# Patient Record
Sex: FEMALE | Race: WHITE | NOT HISPANIC OR LATINO | URBAN - METROPOLITAN AREA
[De-identification: names, ages, dates, MRNs, and addresses within clinical notes are randomized per-mention and may not be internally consistent; named-entity substitution may affect disease eponyms.]

---

## 2017-03-02 ENCOUNTER — EMERGENCY (EMERGENCY)
Facility: HOSPITAL | Age: 82
LOS: 0 days | Discharge: HOME | End: 2017-03-02

## 2017-06-27 DIAGNOSIS — Z88.2 ALLERGY STATUS TO SULFONAMIDES: ICD-10-CM

## 2017-06-27 DIAGNOSIS — M79.604 PAIN IN RIGHT LEG: ICD-10-CM

## 2017-06-27 DIAGNOSIS — Z88.6 ALLERGY STATUS TO ANALGESIC AGENT: ICD-10-CM

## 2017-06-27 DIAGNOSIS — Z88.8 ALLERGY STATUS TO OTHER DRUGS, MEDICAMENTS AND BIOLOGICAL SUBSTANCES STATUS: ICD-10-CM
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
## 2017-06-27 DIAGNOSIS — S89.91XA UNSPECIFIED INJURY OF RIGHT LOWER LEG, INITIAL ENCOUNTER: ICD-10-CM

## 2017-06-27 DIAGNOSIS — Z88.0 ALLERGY STATUS TO PENICILLIN: ICD-10-CM

## 2017-06-27 DIAGNOSIS — M79.605 PAIN IN LEFT LEG: ICD-10-CM

## 2017-06-27 DIAGNOSIS — G62.9 POLYNEUROPATHY, UNSPECIFIED: ICD-10-CM
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
## 2017-07-03 ENCOUNTER — OUTPATIENT (OUTPATIENT)
Dept: OUTPATIENT SERVICES | Facility: HOSPITAL | Age: 82
LOS: 1 days | Discharge: HOME | End: 2017-07-03

## 2017-07-03 DIAGNOSIS — I70.223 ATHEROSCLEROSIS OF NATIVE ARTERIES OF EXTREMITIES WITH REST PAIN, BILATERAL LEGS: ICD-10-CM

## 2017-07-03 DIAGNOSIS — I63.9 CEREBRAL INFARCTION, UNSPECIFIED: ICD-10-CM

## 2017-07-13 ENCOUNTER — EMERGENCY (EMERGENCY)
Facility: HOSPITAL | Age: 82
LOS: 0 days | Discharge: HOME | End: 2017-07-13
Admitting: INTERNAL MEDICINE

## 2017-07-13 DIAGNOSIS — Z79.02 LONG TERM (CURRENT) USE OF ANTITHROMBOTICS/ANTIPLATELETS: ICD-10-CM

## 2017-07-13 DIAGNOSIS — Z88.2 ALLERGY STATUS TO SULFONAMIDES: ICD-10-CM

## 2017-07-13 DIAGNOSIS — R10.9 UNSPECIFIED ABDOMINAL PAIN: ICD-10-CM

## 2017-07-13 DIAGNOSIS — Z88.6 ALLERGY STATUS TO ANALGESIC AGENT: ICD-10-CM

## 2017-07-13 DIAGNOSIS — M79.606 PAIN IN LEG, UNSPECIFIED: ICD-10-CM

## 2017-07-13 DIAGNOSIS — Z88.8 ALLERGY STATUS TO OTHER DRUGS, MEDICAMENTS AND BIOLOGICAL SUBSTANCES STATUS: ICD-10-CM

## 2017-07-13 DIAGNOSIS — Z88.0 ALLERGY STATUS TO PENICILLIN: ICD-10-CM

## 2017-07-13 DIAGNOSIS — I10 ESSENTIAL (PRIMARY) HYPERTENSION: ICD-10-CM

## 2017-07-13 DIAGNOSIS — I63.9 CEREBRAL INFARCTION, UNSPECIFIED: ICD-10-CM

## 2017-07-13 DIAGNOSIS — Z79.899 OTHER LONG TERM (CURRENT) DRUG THERAPY: ICD-10-CM

## 2017-08-23 PROBLEM — Z00.00 ENCOUNTER FOR PREVENTIVE HEALTH EXAMINATION: Status: ACTIVE | Noted: 2017-08-23

## 2018-04-03 ENCOUNTER — INPATIENT (INPATIENT)
Facility: HOSPITAL | Age: 83
LOS: 1 days | Discharge: LEFT BEFORE DISCHARGE | End: 2018-04-05
Attending: INTERNAL MEDICINE | Admitting: SPECIALIST

## 2018-04-03 VITALS
OXYGEN SATURATION: 97 % | SYSTOLIC BLOOD PRESSURE: 149 MMHG | RESPIRATION RATE: 18 BRPM | TEMPERATURE: 97 F | DIASTOLIC BLOOD PRESSURE: 76 MMHG | HEART RATE: 76 BPM

## 2018-04-03 DIAGNOSIS — Z98.890 OTHER SPECIFIED POSTPROCEDURAL STATES: Chronic | ICD-10-CM

## 2018-04-03 LAB
ANION GAP SERPL CALC-SCNC: 11 MMOL/L — SIGNIFICANT CHANGE UP (ref 7–14)
BASOPHILS # BLD AUTO: 0.03 K/UL — SIGNIFICANT CHANGE UP (ref 0–0.2)
BASOPHILS NFR BLD AUTO: 0.3 % — SIGNIFICANT CHANGE UP (ref 0–1)
BUN SERPL-MCNC: 41 MG/DL — HIGH (ref 10–20)
CALCIUM SERPL-MCNC: 9.7 MG/DL — SIGNIFICANT CHANGE UP (ref 8.5–10.1)
CHLORIDE SERPL-SCNC: 101 MMOL/L — SIGNIFICANT CHANGE UP (ref 98–110)
CK SERPL-CCNC: 23 U/L — SIGNIFICANT CHANGE UP (ref 0–225)
CO2 SERPL-SCNC: 27 MMOL/L — SIGNIFICANT CHANGE UP (ref 17–32)
CREAT SERPL-MCNC: 1.6 MG/DL — HIGH (ref 0.7–1.5)
EOSINOPHIL # BLD AUTO: 0.12 K/UL — SIGNIFICANT CHANGE UP (ref 0–0.7)
EOSINOPHIL NFR BLD AUTO: 1.3 % — SIGNIFICANT CHANGE UP (ref 0–8)
GLUCOSE SERPL-MCNC: 193 MG/DL — HIGH (ref 70–99)
HCT VFR BLD CALC: 39.3 % — SIGNIFICANT CHANGE UP (ref 37–47)
HGB BLD-MCNC: 12.6 G/DL — SIGNIFICANT CHANGE UP (ref 12–16)
IMM GRANULOCYTES NFR BLD AUTO: 0.2 % — SIGNIFICANT CHANGE UP (ref 0.1–0.3)
LYMPHOCYTES # BLD AUTO: 0.96 K/UL — LOW (ref 1.2–3.4)
LYMPHOCYTES # BLD AUTO: 10.1 % — LOW (ref 20.5–51.1)
MCHC RBC-ENTMCNC: 29.4 PG — SIGNIFICANT CHANGE UP (ref 27–31)
MCHC RBC-ENTMCNC: 32.1 G/DL — SIGNIFICANT CHANGE UP (ref 32–37)
MCV RBC AUTO: 91.8 FL — SIGNIFICANT CHANGE UP (ref 81–99)
MONOCYTES # BLD AUTO: 0.37 K/UL — SIGNIFICANT CHANGE UP (ref 0.1–0.6)
MONOCYTES NFR BLD AUTO: 3.9 % — SIGNIFICANT CHANGE UP (ref 1.7–9.3)
NEUTROPHILS # BLD AUTO: 8 K/UL — HIGH (ref 1.4–6.5)
NEUTROPHILS NFR BLD AUTO: 84.2 % — HIGH (ref 42.2–75.2)
PLATELET # BLD AUTO: 286 K/UL — SIGNIFICANT CHANGE UP (ref 130–400)
POTASSIUM SERPL-MCNC: 4.9 MMOL/L — SIGNIFICANT CHANGE UP (ref 3.5–5)
POTASSIUM SERPL-SCNC: 4.9 MMOL/L — SIGNIFICANT CHANGE UP (ref 3.5–5)
RBC # BLD: 4.28 M/UL — SIGNIFICANT CHANGE UP (ref 4.2–5.4)
RBC # FLD: 12.5 % — SIGNIFICANT CHANGE UP (ref 11.5–14.5)
SODIUM SERPL-SCNC: 139 MMOL/L — SIGNIFICANT CHANGE UP (ref 135–146)
WBC # BLD: 9.5 K/UL — SIGNIFICANT CHANGE UP (ref 4.8–10.8)
WBC # FLD AUTO: 9.5 K/UL — SIGNIFICANT CHANGE UP (ref 4.8–10.8)

## 2018-04-03 RX ORDER — GABAPENTIN 400 MG/1
100 CAPSULE ORAL EVERY 12 HOURS
Qty: 0 | Refills: 0 | Status: DISCONTINUED | OUTPATIENT
Start: 2018-04-03 | End: 2018-04-05

## 2018-04-03 RX ORDER — CLOPIDOGREL BISULFATE 75 MG/1
75 TABLET, FILM COATED ORAL DAILY
Qty: 0 | Refills: 0 | Status: DISCONTINUED | OUTPATIENT
Start: 2018-04-03 | End: 2018-04-05

## 2018-04-03 RX ORDER — NICOTINE POLACRILEX 2 MG
1 GUM BUCCAL DAILY
Qty: 0 | Refills: 0 | Status: DISCONTINUED | OUTPATIENT
Start: 2018-04-03 | End: 2018-04-05

## 2018-04-03 RX ORDER — AMLODIPINE BESYLATE 2.5 MG/1
5 TABLET ORAL DAILY
Qty: 0 | Refills: 0 | Status: DISCONTINUED | OUTPATIENT
Start: 2018-04-03 | End: 2018-04-05

## 2018-04-03 RX ORDER — HEPARIN SODIUM 5000 [USP'U]/ML
5000 INJECTION INTRAVENOUS; SUBCUTANEOUS EVERY 8 HOURS
Qty: 0 | Refills: 0 | Status: DISCONTINUED | OUTPATIENT
Start: 2018-04-03 | End: 2018-04-05

## 2018-04-03 RX ORDER — TRAMADOL HYDROCHLORIDE 50 MG/1
25 TABLET ORAL
Qty: 0 | Refills: 0 | Status: DISCONTINUED | OUTPATIENT
Start: 2018-04-03 | End: 2018-04-04

## 2018-04-03 RX ORDER — SODIUM CHLORIDE 9 MG/ML
3 INJECTION INTRAMUSCULAR; INTRAVENOUS; SUBCUTANEOUS ONCE
Qty: 0 | Refills: 0 | Status: COMPLETED | OUTPATIENT
Start: 2018-04-03 | End: 2018-04-03

## 2018-04-03 RX ORDER — GABAPENTIN 400 MG/1
250 CAPSULE ORAL
Qty: 0 | Refills: 0 | Status: DISCONTINUED | OUTPATIENT
Start: 2018-04-03 | End: 2018-04-04

## 2018-04-03 RX ORDER — LOSARTAN POTASSIUM 100 MG/1
50 TABLET, FILM COATED ORAL DAILY
Qty: 0 | Refills: 0 | Status: DISCONTINUED | OUTPATIENT
Start: 2018-04-03 | End: 2018-04-05

## 2018-04-03 RX ORDER — MIRTAZAPINE 45 MG/1
7.5 TABLET, ORALLY DISINTEGRATING ORAL AT BEDTIME
Qty: 0 | Refills: 0 | Status: DISCONTINUED | OUTPATIENT
Start: 2018-04-03 | End: 2018-04-05

## 2018-04-03 RX ADMIN — AMLODIPINE BESYLATE 5 MILLIGRAM(S): 2.5 TABLET ORAL at 18:12

## 2018-04-03 RX ADMIN — MIRTAZAPINE 7.5 MILLIGRAM(S): 45 TABLET, ORALLY DISINTEGRATING ORAL at 22:24

## 2018-04-03 RX ADMIN — HEPARIN SODIUM 5000 UNIT(S): 5000 INJECTION INTRAVENOUS; SUBCUTANEOUS at 22:24

## 2018-04-03 NOTE — ED PROVIDER NOTE - NS ED ROS FT
Eyes:  No visual changes  ENMT:  no otalgia. no sore throat.   Cardiac:  No chest pain or sob.   Respiratory:  No cough or respiratory distress.  GI:  No nausea, vomiting, diarrhea or abdominal pain.  :  no dysuria   MS:  (+) quadricep muscles stiff.   (+) neck pain with radicular pain  Neuro:  no headache. no focal weakness.   Skin:  no skin rash.   Endocrine: no diabetes

## 2018-04-03 NOTE — H&P ADULT - NSHPPHYSICALEXAM_GEN_ALL_CORE
T(F): 97.9 (04-03-18 @ 16:02), Max: 97.9 (04-03-18 @ 16:02)  HR: 67 (04-03-18 @ 16:02) (67 - 76)  BP: 121/58 (04-03-18 @ 16:02) (121/58 - 149/76)  RR: 18 (04-03-18 @ 16:02) (18 - 18)  SpO2: 95% (04-03-18 @ 16:02) (95% - 97%)  PHYSICAL EXAM:  GENERAL: NAD, speaks in full sentences, no signs of respiratory distress  HEAD:  Atraumatic, Normocephalic  EYES: EOMI, PERRLA, conjunctiva and sclera clear  NECK: Supple, No JVD  CHEST/LUNG: Clear to auscultation bilaterally; No wheeze; No crackles; No accessory muscles used  HEART: Regular rate and rhythm; No murmurs;   ABDOMEN: Soft, Nontender, Nondistended; Bowel sounds present; No guarding  EXTREMITIES:  2+ Peripheral Pulses, No cyanosis or edema  PSYCH: AAOx3  NEUROLOGY: 4/5 in lower extremities and 5/5 in upper extremities.   SKIN: No rashes or lesions

## 2018-04-03 NOTE — H&P ADULT - PMH
Abdominal aortic aneurysm (AAA) without rupture    Essential hypertension    Falls    TIA (transient ischemic attack)

## 2018-04-03 NOTE — H&P ADULT - NSHPSOCIALHISTORY_GEN_ALL_CORE
Marital Status:  (   )    (   ) Single    (   )    ( x )   Occupation: retired  Lives with: (x  ) alone  (  ) children   (  ) spouse   (  ) parents  (  ) other    Substance Use (street drugs): ( x ) never used  (  ) other:  Tobacco Usage:  (   ) never smoked   (   ) former smoker   (  x ) current smoker  ( 1 [id number] ) pack year  (        ) last cigarette date  Alcohol Usage: none

## 2018-04-03 NOTE — H&P ADULT - HISTORY OF PRESENT ILLNESS
82Y F with pmh of htn, tia, aaa s/p repair and neuropathy presents to the ED with complaints of generalized weakness for the past 3 months. Patient says she was ambulating without assistance 3 months ago but since then she has started experiencing bilateral arm pain, neck and back pain. This has made it more difficult for her to get up from a sitting position and move around. Since the past 3 weeks she feels that her thigh muscles are very weak and she is unable to get up from a chair without using both arm for assistance. She has no weight loss, sob, chest pain, nausea, vomiting, diarrhea, joint swelling, numbness/tingling in extremities, dis balance, or loss of appetite. As per niece they have found the patient on the floor multiple times when they went to visit her (she lives alone). No recent fevers, chills or dizziness.

## 2018-04-03 NOTE — ED PROVIDER NOTE - MEDICAL DECISION MAKING DETAILS
pt unsafe dc. Lives alone, and having multiple falls. Pt uses cane/walker and despite this having the noted problems

## 2018-04-03 NOTE — H&P ADULT - ASSESSMENT
82Y F with pmh of htn, tia, aaa s/p repair and neuropathy presents to the ED with complaints of generalized weakness for the past 3 months.    Generalized weakness likely 2/2 arthritis vs cervical stenosis vs uremia vs hypothyroidism vs malignancy   -Admit to medicine  -CT head wnl   -cxr shows nodular opacity and pt is current smoker. Has never had CT chest, colonoscopy or mammogram  -Check TSH, b12, esr  -BUN elevated. check urine sodium and serum osmolality. CMP and ldh  -Creatinine is at baseline likely 2/2 CKD  -Cervical spine xray  -Pain control with tramadol  -Pt rehab eval    CKD stable  -monitor bmp    HTN  -controlled with losartan and amlodipine    TIA  -c/w plavix, pt stopped statin due to muscle aches    Neuropathy  -c/w gabapentin and mirtazapine    Dispo: Pt is DNR/DNI from home. Discharge as per PT rehab

## 2018-04-03 NOTE — ED PROVIDER NOTE - OBJECTIVE STATEMENT
82 F pmh htn, tia on plavix presents for complaint of difficulty ambulating 2/2 heaviness in her legs localized to her quadriceps muscles worsening recently.  patient reports she ambulates with a cane but is having increasing difficulty.  patient also complaining of chronic neck pain with radicular pain to arms. denies headache. no visual changes. no chest pain. no sob.  no abd pain.  denies fever/chills.

## 2018-04-03 NOTE — ED PROVIDER NOTE - PROGRESS NOTE DETAILS
Discussed patient with her niece Lindsay.  she reports patient lives at home alone, she has been falling more frequently.  family checks on her frequently and patient was found on the floor at home last weekend by her daughter, uncertain of how long she was down. Attending note:  I personally evaluated the patient. I reviewed the Resident’s note (as assigned above), and agree with the findings and plan except as documented in my note.   81 y/o F presents reporting difficulty ambulating due to heaviness in her thighs and feeling that the anterior thigh musculature is tight vs. that she has lost a lot of weight making her thighs feel firm to her. Pt at baseline ambulates with a cane or walker. Pt also reporting exacerbation of chronic pain in her shoulders for which she receives outpatient care and takes Gabapentin. Pt’s family requested a conversation noting to us that pt has been with what they believe to be additional TIA’s with fall. At one point family states that they found pt on the floor several days ago for an unknown duration of time. When directly questioned pt denies having any difficulty getting off of the floor, states that she had fallen but not for over a week. Pt denies HA, dizziness, change in vision or hearing, feeling off balance, CP, SOB, abdominal pain, fever. Pt is awake and alert, she is in no distress, NCAT, CN intact, there is diffuse weakness 4+ over 5 throughout, no edema, no deformities, no tenderness in the extremities or midline spine, chest clear. Will check labs, IVF, and reevaluate for d/c. I am concerned for pt’s safety at home given her limited ability to ambulate and her families report of frequent falls.

## 2018-04-03 NOTE — ED PROVIDER NOTE - PHYSICAL EXAMINATION
CONSTITUTIONAL: Well-developed; well-nourished; in no acute distress.   SKIN: warm, dry  HEAD: Normocephalic; atraumatic.  EYES: no conj injection  ENT: No nasal discharge; airway clear.  CARD: S1, S2 normal  RESP: clear to auscultation bilaterally   ABD: soft ntnd  EXT:  no ctls spine tenderness. Normal ROM.  5/5 strength bilaterally.  2+ distal pulses.  < 2 sec cap refill   NEURO: alert, oriented, grossly unremarkable

## 2018-04-03 NOTE — H&P ADULT - NSHPLABSRESULTS_GEN_ALL_CORE
12.6   9.50  )-----------( 286      ( 03 Apr 2018 11:19 )             39.3       04-03    139  |  101  |  41<H>  ----------------------------<  193<H>  4.9   |  27  |  1.6<H>    Ca    9.7      03 Apr 2018 11:19

## 2018-04-04 LAB
ALBUMIN SERPL ELPH-MCNC: 3.6 G/DL — SIGNIFICANT CHANGE UP (ref 3.5–5.2)
ALP SERPL-CCNC: 97 U/L — SIGNIFICANT CHANGE UP (ref 30–115)
ALT FLD-CCNC: 10 U/L — SIGNIFICANT CHANGE UP (ref 0–41)
ANION GAP SERPL CALC-SCNC: 18 MMOL/L — HIGH (ref 7–14)
AST SERPL-CCNC: 15 U/L — SIGNIFICANT CHANGE UP (ref 0–41)
BILIRUB SERPL-MCNC: <0.2 MG/DL — SIGNIFICANT CHANGE UP (ref 0.2–1.2)
BUN SERPL-MCNC: 40 MG/DL — HIGH (ref 10–20)
CALCIUM SERPL-MCNC: 9.1 MG/DL — SIGNIFICANT CHANGE UP (ref 8.5–10.1)
CHLORIDE SERPL-SCNC: 104 MMOL/L — SIGNIFICANT CHANGE UP (ref 98–110)
CK SERPL-CCNC: 26 U/L — SIGNIFICANT CHANGE UP (ref 0–225)
CO2 SERPL-SCNC: 19 MMOL/L — SIGNIFICANT CHANGE UP (ref 17–32)
CREAT SERPL-MCNC: 1.7 MG/DL — HIGH (ref 0.7–1.5)
ERYTHROCYTE [SEDIMENTATION RATE] IN BLOOD: 44 MM/HR — HIGH (ref 0–20)
GLUCOSE SERPL-MCNC: 72 MG/DL — SIGNIFICANT CHANGE UP (ref 70–99)
POTASSIUM SERPL-MCNC: 5.2 MMOL/L — HIGH (ref 3.5–5)
POTASSIUM SERPL-SCNC: 5.2 MMOL/L — HIGH (ref 3.5–5)
PROT SERPL-MCNC: 5.8 G/DL — LOW (ref 6–8)
SODIUM SERPL-SCNC: 141 MMOL/L — SIGNIFICANT CHANGE UP (ref 135–146)

## 2018-04-04 RX ORDER — TRAMADOL HYDROCHLORIDE 50 MG/1
50 TABLET ORAL
Qty: 0 | Refills: 0 | Status: DISCONTINUED | OUTPATIENT
Start: 2018-04-04 | End: 2018-04-05

## 2018-04-04 RX ADMIN — GABAPENTIN 100 MILLIGRAM(S): 400 CAPSULE ORAL at 05:49

## 2018-04-04 RX ADMIN — CLOPIDOGREL BISULFATE 75 MILLIGRAM(S): 75 TABLET, FILM COATED ORAL at 14:28

## 2018-04-04 RX ADMIN — HEPARIN SODIUM 5000 UNIT(S): 5000 INJECTION INTRAVENOUS; SUBCUTANEOUS at 05:49

## 2018-04-04 RX ADMIN — GABAPENTIN 100 MILLIGRAM(S): 400 CAPSULE ORAL at 17:36

## 2018-04-04 RX ADMIN — LOSARTAN POTASSIUM 50 MILLIGRAM(S): 100 TABLET, FILM COATED ORAL at 05:48

## 2018-04-04 RX ADMIN — MIRTAZAPINE 7.5 MILLIGRAM(S): 45 TABLET, ORALLY DISINTEGRATING ORAL at 21:15

## 2018-04-04 RX ADMIN — AMLODIPINE BESYLATE 5 MILLIGRAM(S): 2.5 TABLET ORAL at 05:48

## 2018-04-04 NOTE — CONSULT NOTE ADULT - ASSESSMENT
IMPRESSION: Rehab of Ataxia, right shoulder impingement syndrome, neck pain    PRECAUTIONS: [x  ] Cardiac  [  ] Respiratory  [  ] Seizures [  ] Contact Isolation  [  ] Droplet Isolation  [  ] Other    Weight Bearing Status:     RECOMMENDATION:    Out of Bed to Chair     DVT/Decubiti Prophylaxis    REHAB PLAN:     [ xx  ] Bedside P/T 3-5 times a week   [   ]   Bedside O/T  2-3 times a week             [   ] No Rehab Therapy Indicated                   [   ]  Speech Therapy   Conditioning/ROM                                    ADL  Bed Mobility                                               Conditioning/ROM  Transfers                                                     Bed Mobility  Sitting /Standing Balance                         Transfers                                        Gait Training                                               Sitting/Standing Balance  Stair Training [   ]Applicable                    Home equipment Eval                                                                        Splinting  [   ] Only      GOALS:   ADL   [x   ]   Independent                    Transfers  [ x  ] Independent                          Ambulation  [ x  ] Independent     [x    ] With device                            [   ]  CG                                                         [   ]  CG                                                                  [   ] CG                            [    ] Min A                                                   [   ] Min A                                                              [   ] Min  A          DISCHARGE PLAN:   [   ]  Good candidate for Intensive Rehabilitation/Hospital based-4A [facility]                                             Will tolerate 3hrs Intensive Rehab Daily                                       [ xx   ]  Short Term Rehab in Skilled Nursing Facility                                       [    ]  Home with Outpatient or  services                                         [    ]  Possible Candidate for Intensive Hospital based Rehab

## 2018-04-04 NOTE — PROGRESS NOTE ADULT - SUBJECTIVE AND OBJECTIVE BOX
LENGTH OF HOSPITAL STAY: 1d    CHIEF COMPLAINT:   Patient is a 82y old  Female who presents with a chief complaint of generalized weakness (03 Apr 2018 16:55)    EVENTS OVERNIGHT:  HISTORY OF PRESENTING ILLNESS:    HPI:  82Y F with pmh of htn, tia, aaa s/p repair and neuropathy presents to the ED with complaints of generalized weakness for the past 3 months. Patient says she was ambulating without assistance 3 months ago but since then she has started experiencing bilateral arm pain, neck and back pain. This has made it more difficult for her to get up from a sitting position and move around. Since the past 3 weeks she feels that her thigh muscles are very weak and she is unable to get up from a chair without using both arm for assistance. She has no weight loss, sob, chest pain, nausea, vomiting, diarrhea, joint swelling, numbness/tingling in extremities, dis balance, or loss of appetite. As per niece they have found the patient on the floor multiple times when they went to visit her (she lives alone). No recent fevers, chills or dizziness. (03 Apr 2018 16:55)    PAST MEDICAL & SURGICAL HISTORY  PAST MEDICAL & SURGICAL HISTORY:  Abdominal aortic aneurysm (AAA) without rupture  Essential hypertension  Falls  TIA (transient ischemic attack)  H/O abdominal aortic aneurysm repair    SOCIAL HISTORY:  current smoker (1PPD)  lives alone  ALLERGIES:  Fosamax (Other (Moderate))  Novacet (Rash)  penicillin (Rash)  Sulfac 10% (Rash)  Tylenol (Pruritus)    Home Medications:  amLODIPine 5 mg oral tablet: 1 tab(s) orally once a day (03 Apr 2018 17:09)  CLOPIDOGREL  TAB 75MG:  (03 Apr 2018 17:09)  GABAPENTIN   /5ML:  (03 Apr 2018 17:09)  LOSARTAN POT TAB 50MG:  (03 Apr 2018 17:09)  MIRTAZAPINE  TAB 7.5MG:  (03 Apr 2018 17:09)      MEDICATIONS:  STANDING MEDICATIONS  amLODIPine   Tablet 5 milliGRAM(s) Oral daily  clopidogrel Tablet 75 milliGRAM(s) Oral daily  gabapentin 100 milliGRAM(s) Oral every 12 hours  gabapentin   Solution 250 milliGRAM(s) Oral two times a day  heparin  Injectable 5000 Unit(s) SubCutaneous every 8 hours  losartan 50 milliGRAM(s) Oral daily  mirtazapine 7.5 milliGRAM(s) Oral at bedtime  nicotine - 21 mG/24Hr(s) Patch 1 patch Transdermal daily    PRN MEDICATIONS  traMADol 25 milliGRAM(s) Oral two times a day PRN    VITALS:   T(F): 98  HR: 72  BP: 136/70  RR: 16  SpO2: 95%    LABS:                        12.6   9.50  )-----------( 286      ( 03 Apr 2018 11:19 )             39.3     04-03    139  |  101  |  41<H>  ----------------------------<  193<H>  4.9   |  27  |  1.6<H>    Ca    9.7      03 Apr 2018 11:19            Creatine Kinase, Serum: 23 U/L (04-03-18 @ 11:19)      CARDIAC MARKERS ( 03 Apr 2018 11:19 )  x     / x     / 23 U/L / x     / x          RADIOLOGY:  < from: CT Head No Cont (04.03.18 @ 15:00) >    IMPRESSION:     No evidence of acute intracranial pathology.  Stableexam since 3/12/2014.        < end of copied text >  < from: Xray Chest 1 View AP/PA (04.03.18 @ 11:31) >    Impression:      Right basilar peripheral nodular opacity. Right basilar focal atelectasis.    < end of copied text >    PHYSICAL EXAM:  GEN: No acute distress  HEENT:   LUNGS: Clear to auscultation bilaterally   HEART: S1/S2 present. RRR.   ABD: Soft, non-tender, non-distended. Bowel sounds present  EXT:  NEURO: AAOX3 LENGTH OF HOSPITAL STAY: 1d    CHIEF COMPLAINT:   Patient is a 82y old  Female who presents with a chief complaint of generalized weakness (03 Apr 2018 16:55)    EVENTS OVERNIGHT:none ,lying comfortably in bed  HISTORY OF PRESENTING ILLNESS:    HPI:  82Y F with pmh of htn, tia, aaa s/p repair and neuropathy presents to the ED with complaints of generalized weakness for the past 3 months. Patient says she was ambulating without assistance 3 months ago but since then she has started experiencing bilateral arm pain, neck and back pain. This has made it more difficult for her to get up from a sitting position and move around. Since the past 3 weeks she feels that her thigh muscles are very weak and she is unable to get up from a chair without using both arm for assistance. She has no weight loss, sob, chest pain, nausea, vomiting, diarrhea, joint swelling, numbness/tingling in extremities, dis balance, or loss of appetite. As per niece they have found the patient on the floor multiple times when they went to visit her (she lives alone). No recent fevers, chills or dizziness. (03 Apr 2018 16:55)    PAST MEDICAL & SURGICAL HISTORY  PAST MEDICAL & SURGICAL HISTORY:  Abdominal aortic aneurysm (AAA) without rupture  Essential hypertension  Falls  TIA (transient ischemic attack)  H/O abdominal aortic aneurysm repair    SOCIAL HISTORY:  current smoker (1PPD)  lives alone  ALLERGIES:  Fosamax (Other (Moderate))  Novacet (Rash)  penicillin (Rash)  Sulfac 10% (Rash)  Tylenol (Pruritus)    Home Medications:  amLODIPine 5 mg oral tablet: 1 tab(s) orally once a day (03 Apr 2018 17:09)  CLOPIDOGREL  TAB 75MG:  (03 Apr 2018 17:09)  GABAPENTIN   /5ML:  (03 Apr 2018 17:09)  LOSARTAN POT TAB 50MG:  (03 Apr 2018 17:09)  MIRTAZAPINE  TAB 7.5MG:  (03 Apr 2018 17:09)      MEDICATIONS:  STANDING MEDICATIONS  amLODIPine   Tablet 5 milliGRAM(s) Oral daily  clopidogrel Tablet 75 milliGRAM(s) Oral daily  gabapentin 100 milliGRAM(s) Oral every 12 hours  gabapentin   Solution 250 milliGRAM(s) Oral two times a day  heparin  Injectable 5000 Unit(s) SubCutaneous every 8 hours  losartan 50 milliGRAM(s) Oral daily  mirtazapine 7.5 milliGRAM(s) Oral at bedtime  nicotine - 21 mG/24Hr(s) Patch 1 patch Transdermal daily    PRN MEDICATIONS  traMADol 25 milliGRAM(s) Oral two times a day PRN    VITALS:   T(F): 98  HR: 72  BP: 136/70  RR: 16  SpO2: 95%    LABS:                                           12.6   9.50  )-----------( 286      ( 03 Apr 2018 11:19 )             39.3   04-04    141  |  104  |  40<H>  ----------------------------<  72  5.2<H>   |  19  |  1.7<H>    Ca    9.1      04 Apr 2018 05:52    TPro  5.8<L>  /  Alb  3.6  /  TBili  <0.2  /  DBili  x   /  AST  15  /  ALT  10  /  AlkPhos  97  04-04            Creatine Kinase, Serum: 23 U/L (04-03-18 @ 11:19)      CARDIAC MARKERS ( 03 Apr 2018 11:19 )  x     / x     / 23 U/L / x     / x          RADIOLOGY:  < from: CT Head No Cont (04.03.18 @ 15:00) >    IMPRESSION:     No evidence of acute intracranial pathology.  Stableexam since 3/12/2014.        < end of copied text >  < from: Xray Chest 1 View AP/PA (04.03.18 @ 11:31) >    Impression:      Right basilar peripheral nodular opacity. Right basilar focal atelectasis.    < end of copied text >    PHYSICAL EXAM:  GEN: No acute distress  HEENT: within normal range  LUNGS: Clear to auscultation bilaterally   HEART: S1/S2 present. RRR.   ABD: Soft, non-tender, non-distended. Bowel sounds present  EXT:no LE edema  NEURO: AAOX3  non focal  power 5/5 in all extremities

## 2018-04-04 NOTE — PROGRESS NOTE ADULT - ASSESSMENT
82Y F with pmh of htn, tia, aaa s/p repair and neuropathy presents to the ED with complaints of generalized weakness for the past 3 months.    3Generalized weakness likely        2/2 arthritis vs cervical stenosis vs uremia vs hypothyroidism vs malignancy   -CT head wnl   -cxr shows nodular opacity and pt is current smoker. Has never had CT chest, colonoscopy or mammogram  -Check TSH, b12, esr  -BUN elevated. check urine sodium and serum osmolality. CMP and ldh  -Creatinine is at baseline likely 2/2 CKD  #-Cervical spine xray  -Pain control with tramadol  -Pt rehab eval    #CKD stable  -monitor bmp  -baseline 1.5 (7/17)    #HTN  -controlled with losartan and amlodipine    #TIA  -c/w plavix, pt stopped statin due to muscle aches    #Neuropathy  -c/w gabapentin and mirtazapine    Dispo: Pt is DNR/DNI from home.   Discharge pendind PT eval 82Y F with pmh of htn, tia, aaa s/p repair and neuropathy presents to the ED with complaints of generalized weakness for the past 3 months.    3Generalized weakness likely        2/2 arthritis vs cervical stenosis vs uremia vs hypothyroidism vs malignancy   -CT head wnl   -cxr shows nodular opacity and pt is current smoker. Has never had CT chest, colonoscopy or mammogram  -Check TSH, b12, esr  -BUN elevated. check urine sodium and serum osmolality. CMP and ldh  -Creatinine is at baseline likely 2/2 CKD  #-Cervical spine xray  -Pain control with tramadol  -Pt rehab eval    #CKD stable  -monitor bmp  -baseline 1.5 (7/17)    #HTN  -controlled with losartan and amlodipine    #TIA  -c/w plavix, pt stopped statin due to muscle aches    #Neuropathy  -c/w gabapentin and mirtazapine    Dispo: Pt is DNR/DNI from home.   Discharge pending PT eval  possible candidate for SNF 82Y F with pmh of htn, tia, aaa s/p repair and neuropathy presents to the ED with complaints of generalized weakness for the past 3 months.    #Generalized weakness likely        2/2 arthritis vs cervical stenosis vs uremia vs hypothyroidism vs malignancy   -CT head wnl   -cxr shows nodular opacity and pt is current smoker. Has never had CT chest, colonoscopy or mammogram  -Check TSH, b12, ESR  -BUN elevated. check urine sodium and serum osmolality. CMP and LDH  -Creatinine is at baseline likely 2/2 CKD  -Cervical spine xray  -Pain control with tramadol  -Pt rehab eval    #CKD stable  -monitor bmp  -baseline 1.5 (7/17)    #HTN  -controlled with losartan and amlodipine    #TIA  -c/w plavix, pt stopped statin due to muscle aches    #Neuropathy  -c/w gabapentin and mirtazapine    Dispo: Pt is DNR/DNI from home.   Discharge pending PT eval  possible candidate for SNF

## 2018-04-04 NOTE — CONSULT NOTE ADULT - SUBJECTIVE AND OBJECTIVE BOX
HPI:  82Y F with pmh of htn, tia, aaa s/p repair and neuropathy presents to the ED with complaints of generalized weakness for the past 3 months. Patient says she was ambulating without assistance 3 months ago but since then she has started experiencing bilateral arm pain, neck and back pain. This has made it more difficult for her to get up from a sitting position and move around. Since the past 3 weeks she feels that her thigh muscles are very weak and she is unable to get up from a chair without using both arm for assistance. She has no weight loss, sob, chest pain, nausea, vomiting, diarrhea, joint swelling, numbness/tingling in extremities, dis balance, or loss of appetite. As per niece they have found the patient on the floor multiple times when they went to visit her (she lives alone). No recent fevers, chills or dizziness. (03 Apr 2018 16:55)      PAST MEDICAL & SURGICAL HISTORY:  Abdominal aortic aneurysm (AAA) without rupture  Essential hypertension  Falls  TIA (transient ischemic attack)  H/O abdominal aortic aneurysm repair      Hospital Course:  She needs some help with transfers and then amb. with walker with slight unsteadiness. She has some right shoulder pain. She has some neck pain. Strength appears preserved in the LE's.  TODAY'S SUBJECTIVE & REVIEW OF SYMPTOMS:     Constitutional WNL   Cardio WNL   Resp WNL   GI WNL  Heme WNL  Endo WNL  Skin WNL  MSK WNL  Neuro WNL  Cognitive WNL  Psych WNL      MEDICATIONS  (STANDING):  amLODIPine   Tablet 5 milliGRAM(s) Oral daily  clopidogrel Tablet 75 milliGRAM(s) Oral daily  gabapentin 100 milliGRAM(s) Oral every 12 hours  heparin  Injectable 5000 Unit(s) SubCutaneous every 8 hours  losartan 50 milliGRAM(s) Oral daily  mirtazapine 7.5 milliGRAM(s) Oral at bedtime  nicotine - 21 mG/24Hr(s) Patch 1 patch Transdermal daily    MEDICATIONS  (PRN):  traMADol 50 milliGRAM(s) Oral two times a day PRN Moderate Pain (4 - 6)      FAMILY HISTORY:  No pertinent family history in first degree relatives      Allergies    Fosamax (Other (Moderate))  Novacet (Rash)  penicillin (Rash)  Sulfac 10% (Rash)  Tylenol (Pruritus)    Intolerances        SOCIAL HISTORY:    [  ] Etoh  [  ] Smoking  [  ] Substance abuse     Home Environment:  [x  ] Home Alone  [x  ] Lives with Family--lives in downstairs apt in same house  [  ] Home Health Aid    Dwelling:  [  ] Apartment  [  ] Private House  [  ] Adult Home  [  ] Skilled Nursing Facility      [  ] Short Term  [  ] Long Term  [  ] Stairs       Elevator [  ]    FUNCTIONAL STATUS PTA: (Check all that apply)  Ambulation: [x   ]Independent    [  ] Dependent     [  ] Non-Ambulatory  Assistive Device: [x  ] SA Cane  [  ]  Q Cane  [  ] Walker  [  ]  Wheelchair  ADL : [  ] Independent  [  ]  Dependent       Vital Signs Last 24 Hrs  T(C): 37 (04 Apr 2018 06:00), Max: 37 (04 Apr 2018 06:00)  T(F): 98.6 (04 Apr 2018 06:00), Max: 98.6 (04 Apr 2018 06:00)  HR: 80 (04 Apr 2018 06:00) (67 - 80)  BP: 130/70 (04 Apr 2018 06:00) (121/58 - 159/73)  BP(mean): --  RR: 18 (04 Apr 2018 06:00) (16 - 18)  SpO2: 95% (03 Apr 2018 16:02) (95% - 95%)      PHYSICAL EXAM: Alert & Oriented X3  GENERAL: NAD, well-groomed, well-developed  HEAD:  Atraumatic, Normocephalic  EYES: EOMI, PERRLA, conjunctiva and sclera clear  NECK: Supple, No JVD, Normal thyroid  CHEST/LUNG: Clear to percussion bilaterally; No rales, rhonchi, wheezing, or rubs  HEART: Regular rate and rhythm; No murmurs, rubs, or gallops  ABDOMEN: Soft, Nontender, Nondistended; Bowel sounds present  EXTREMITIES:  2+ Peripheral Pulses, No clubbing, cyanosis, or edema    NERVOUS SYSTEM:  Cranial Nerves 2-12 intact [  ] Abnormal  [  ]  ROM: WFL all extremities [  ]  Abnormal [  ] positive right shoulder impngement sign at 160 degrees  Motor Strength: WFL all extremities  [  ]  Abnormal [  ] 5/5 in LE's; right shoulder 4/5  Sensation: intact to light touch [  ] Abnormal [  ]  Reflexes: Symmetric [  ]  Abnormal [  ]    FUNCTIONAL STATUS:  Bed Mobility: Independent [  ]  Supervision [  ]  Needs Assistance [  ]  N/A [  ]  Transfers: Independent [  ]  Supervision [  ]  Needs Assistance [  ]  N/A [  ]   Ambulation: Independent [  ]  Supervision [  ]  Needs Assistance [  ]  N/A [  ]  ADL: Independent [  ] Requires Assistance [  ] N/A [  ]      LABS:                        12.6   9.50  )-----------( 286      ( 03 Apr 2018 11:19 )             39.3     04-04    141  |  104  |  40<H>  ----------------------------<  72  5.2<H>   |  19  |  1.7<H>    Ca    9.1      04 Apr 2018 05:52    TPro  5.8<L>  /  Alb  3.6  /  TBili  <0.2  /  DBili  x   /  AST  15  /  ALT  10  /  AlkPhos  97  04-04          RADIOLOGY & ADDITIONAL STUDIES:    Assesment:

## 2018-04-05 VITALS — WEIGHT: 105.82 LBS

## 2018-04-05 RX ADMIN — LOSARTAN POTASSIUM 50 MILLIGRAM(S): 100 TABLET, FILM COATED ORAL at 05:48

## 2018-04-05 RX ADMIN — AMLODIPINE BESYLATE 5 MILLIGRAM(S): 2.5 TABLET ORAL at 05:48

## 2018-04-05 RX ADMIN — GABAPENTIN 100 MILLIGRAM(S): 400 CAPSULE ORAL at 05:48

## 2018-04-05 NOTE — DISCHARGE NOTE ADULT - CARE PLAN
Principal Discharge DX:	Polymyalgia rheumatica  Goal:	diagnosis and treatment  Assessment and plan of treatment:	diagnosed as PMR after ESR of 44 and clinical symptoms. discharged on low dose steroids.

## 2018-04-05 NOTE — DISCHARGE NOTE ADULT - CARE PROVIDER_API CALL
Santiago Berger), Family Medicine  [address] [place] [address]  Phone: [phone]  Fax: [phone]

## 2018-04-05 NOTE — DISCHARGE NOTE ADULT - PATIENT PORTAL LINK FT
You can access the [facility] Portal, offered by [facility], by registering with the following website: [email or web address], [email or web address]

## 2018-04-05 NOTE — DISCHARGE NOTE ADULT - CONDITIONS AT DISCHARGE
patient left AMA even after much discussion with resident, attending and nurse managemer. pt does not want to stay in hops[facility] for PT rehab and homecare setup.

## 2018-04-05 NOTE — DISCHARGE NOTE ADULT - PLAN OF CARE
diagnosis and treatment diagnosed as PMR after ESR of 44 and clinical symptoms. discharged on low dose steroids.

## 2018-04-05 NOTE — DISCHARGE NOTE ADULT - HOSPITAL COURSE
82Y F with pmh of htn, tia, aaa s/p repair and neuropathy presents to the ED with complaints of generalized weakness for the past 3 months. Patient says she was ambulating without assistance 3 months ago but since then she has started experiencing bilateral arm pain, neck and back pain. This has made it more difficult for her to get up from a sitting position and move around. Since the past 3 weeks she feels that her thigh muscles are very weak and she is unable to get up from a chair without using both arm for assistance. She has no weight loss, sob, chest pain, nausea, vomiting, diarrhea, joint swelling, numbness/tingling in extremities, dis balance, or loss of appetite. As per niece they have found the patient on the floor multiple times when they went to visit her (she lives alone). No recent fevers, chills or dizziness.          Diagnosed as polymyalgia rheumatica after ESR of 44 and clinical symptoms. discharged on low dose steroids.      [place] left AMA. patient left AMA even after much discussion with resident, attending and nurse managemer. pt does not want to stay in hopsital for PT rehab and homecare setup.

## 2018-04-07 LAB
T3 SERPL-MCNC: 108 NG/DL — SIGNIFICANT CHANGE UP (ref 80–200)
T4 AB SER-ACNC: 7.1 UG/DL — SIGNIFICANT CHANGE UP (ref 4.6–12)
TSH SERPL-MCNC: 1.11 UIU/ML — SIGNIFICANT CHANGE UP (ref 0.27–4.2)

## 2018-04-09 DIAGNOSIS — Z88.0 ALLERGY STATUS TO PENICILLIN: ICD-10-CM

## 2018-04-09 DIAGNOSIS — G62.9 POLYNEUROPATHY, UNSPECIFIED: ICD-10-CM

## 2018-04-09 DIAGNOSIS — Z66 DO NOT RESUSCITATE: ICD-10-CM

## 2018-04-09 DIAGNOSIS — I12.9 HYPERTENSIVE CHRONIC KIDNEY DISEASE WITH STAGE 1 THROUGH STAGE 4 CHRONIC KIDNEY DISEASE, OR UNSPECIFIED CHRONIC KIDNEY DISEASE: ICD-10-CM

## 2018-04-09 DIAGNOSIS — Z86.73 PERSONAL HISTORY OF TRANSIENT ISCHEMIC ATTACK (TIA), AND CEREBRAL INFARCTION WITHOUT RESIDUAL DEFICITS: ICD-10-CM

## 2018-04-09 DIAGNOSIS — M35.3 POLYMYALGIA RHEUMATICA: ICD-10-CM

## 2018-04-09 DIAGNOSIS — R53.1 WEAKNESS: ICD-10-CM

## 2018-04-09 DIAGNOSIS — Z91.81 HISTORY OF FALLING: ICD-10-CM

## 2018-04-09 DIAGNOSIS — Z88.2 ALLERGY STATUS TO SULFONAMIDES: ICD-10-CM

## 2018-04-09 DIAGNOSIS — Z88.8 ALLERGY STATUS TO OTHER DRUGS, MEDICAMENTS AND BIOLOGICAL SUBSTANCES: ICD-10-CM

## 2018-04-09 DIAGNOSIS — N18.9 CHRONIC KIDNEY DISEASE, UNSPECIFIED: ICD-10-CM

## 2018-08-23 ENCOUNTER — INPATIENT (INPATIENT)
Facility: HOSPITAL | Age: 83
LOS: 7 days | Discharge: NURSING, REHAB OR LONG-TERM CARE | End: 2018-08-31
Attending: INTERNAL MEDICINE | Admitting: INTERNAL MEDICINE

## 2018-08-23 VITALS
TEMPERATURE: 98 F | RESPIRATION RATE: 18 BRPM | HEART RATE: 71 BPM | SYSTOLIC BLOOD PRESSURE: 141 MMHG | OXYGEN SATURATION: 93 % | DIASTOLIC BLOOD PRESSURE: 70 MMHG

## 2018-08-23 DIAGNOSIS — N18.9 CHRONIC KIDNEY DISEASE, UNSPECIFIED: ICD-10-CM

## 2018-08-23 DIAGNOSIS — E87.5 HYPERKALEMIA: ICD-10-CM

## 2018-08-23 DIAGNOSIS — M19.90 UNSPECIFIED OSTEOARTHRITIS, UNSPECIFIED SITE: ICD-10-CM

## 2018-08-23 DIAGNOSIS — Z98.890 OTHER SPECIFIED POSTPROCEDURAL STATES: Chronic | ICD-10-CM

## 2018-08-23 DIAGNOSIS — M84.359A STRESS FRACTURE, HIP, UNSPECIFIED, INITIAL ENCOUNTER FOR FRACTURE: ICD-10-CM

## 2018-08-23 DIAGNOSIS — M35.3 POLYMYALGIA RHEUMATICA: ICD-10-CM

## 2018-08-23 DIAGNOSIS — Z86.73 PERSONAL HISTORY OF TRANSIENT ISCHEMIC ATTACK (TIA), AND CEREBRAL INFARCTION WITHOUT RESIDUAL DEFICITS: ICD-10-CM

## 2018-08-23 DIAGNOSIS — Z79.899 OTHER LONG TERM (CURRENT) DRUG THERAPY: ICD-10-CM

## 2018-08-23 DIAGNOSIS — I12.9 HYPERTENSIVE CHRONIC KIDNEY DISEASE WITH STAGE 1 THROUGH STAGE 4 CHRONIC KIDNEY DISEASE, OR UNSPECIFIED CHRONIC KIDNEY DISEASE: ICD-10-CM

## 2018-08-23 DIAGNOSIS — Z88.2 ALLERGY STATUS TO SULFONAMIDES: ICD-10-CM

## 2018-08-23 DIAGNOSIS — F17.210 NICOTINE DEPENDENCE, CIGARETTES, UNCOMPLICATED: ICD-10-CM

## 2018-08-23 DIAGNOSIS — Z88.0 ALLERGY STATUS TO PENICILLIN: ICD-10-CM

## 2018-08-23 DIAGNOSIS — R33.9 RETENTION OF URINE, UNSPECIFIED: ICD-10-CM
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
## 2018-08-23 PROBLEM — I71.4 ABDOMINAL AORTIC ANEURYSM, WITHOUT RUPTURE: Chronic | Status: ACTIVE | Noted: 2018-04-03

## 2018-08-23 PROBLEM — I10 ESSENTIAL (PRIMARY) HYPERTENSION: Chronic | Status: ACTIVE | Noted: 2018-04-03

## 2018-08-23 PROBLEM — G45.9 TRANSIENT CEREBRAL ISCHEMIC ATTACK, UNSPECIFIED: Chronic | Status: ACTIVE | Noted: 2018-04-03

## 2018-08-23 LAB
ANION GAP SERPL CALC-SCNC: 16 MMOL/L — HIGH (ref 7–14)
BUN SERPL-MCNC: 33 MG/DL — HIGH (ref 10–20)
CALCIUM SERPL-MCNC: 10 MG/DL — SIGNIFICANT CHANGE UP (ref 8.5–10.1)
CHLORIDE SERPL-SCNC: 103 MMOL/L — SIGNIFICANT CHANGE UP (ref 98–110)
CO2 SERPL-SCNC: 23 MMOL/L — SIGNIFICANT CHANGE UP (ref 17–32)
CREAT SERPL-MCNC: 1.7 MG/DL — HIGH (ref 0.7–1.5)
ERYTHROCYTE [SEDIMENTATION RATE] IN BLOOD: 17 MM/HR — SIGNIFICANT CHANGE UP (ref 0–20)
GLUCOSE SERPL-MCNC: 87 MG/DL — SIGNIFICANT CHANGE UP (ref 70–99)
HCT VFR BLD CALC: 44.4 % — SIGNIFICANT CHANGE UP (ref 37–47)
HGB BLD-MCNC: 14.2 G/DL — SIGNIFICANT CHANGE UP (ref 12–16)
MCHC RBC-ENTMCNC: 29.3 PG — SIGNIFICANT CHANGE UP (ref 27–31)
MCHC RBC-ENTMCNC: 32 G/DL — SIGNIFICANT CHANGE UP (ref 32–37)
MCV RBC AUTO: 91.5 FL — SIGNIFICANT CHANGE UP (ref 81–99)
NRBC # BLD: 0 /100 WBCS — SIGNIFICANT CHANGE UP (ref 0–0)
PLATELET # BLD AUTO: 221 K/UL — SIGNIFICANT CHANGE UP (ref 130–400)
POTASSIUM SERPL-MCNC: 4.9 MMOL/L — SIGNIFICANT CHANGE UP (ref 3.5–5)
POTASSIUM SERPL-SCNC: 4.9 MMOL/L — SIGNIFICANT CHANGE UP (ref 3.5–5)
RBC # BLD: 4.85 M/UL — SIGNIFICANT CHANGE UP (ref 4.2–5.4)
RBC # FLD: 13.6 % — SIGNIFICANT CHANGE UP (ref 11.5–14.5)
SODIUM SERPL-SCNC: 142 MMOL/L — SIGNIFICANT CHANGE UP (ref 135–146)
WBC # BLD: 8.65 K/UL — SIGNIFICANT CHANGE UP (ref 4.8–10.8)
WBC # FLD AUTO: 8.65 K/UL — SIGNIFICANT CHANGE UP (ref 4.8–10.8)

## 2018-08-23 RX ORDER — CLOPIDOGREL BISULFATE 75 MG/1
0 TABLET, FILM COATED ORAL
Qty: 90 | Refills: 0 | COMMUNITY

## 2018-08-23 RX ORDER — LOSARTAN POTASSIUM 100 MG/1
0 TABLET, FILM COATED ORAL
Qty: 90 | Refills: 0 | COMMUNITY

## 2018-08-23 RX ORDER — LOSARTAN POTASSIUM 100 MG/1
50 TABLET, FILM COATED ORAL DAILY
Qty: 0 | Refills: 0 | Status: DISCONTINUED | OUTPATIENT
Start: 2018-08-23 | End: 2018-08-29

## 2018-08-23 RX ORDER — GABAPENTIN 400 MG/1
0 CAPSULE ORAL
Qty: 240 | Refills: 0 | COMMUNITY

## 2018-08-23 RX ORDER — TRAMADOL HYDROCHLORIDE 50 MG/1
50 TABLET ORAL
Qty: 0 | Refills: 0 | Status: DISCONTINUED | OUTPATIENT
Start: 2018-08-23 | End: 2018-08-24

## 2018-08-23 RX ORDER — AMLODIPINE BESYLATE 2.5 MG/1
5 TABLET ORAL DAILY
Qty: 0 | Refills: 0 | Status: DISCONTINUED | OUTPATIENT
Start: 2018-08-23 | End: 2018-08-27

## 2018-08-23 RX ORDER — CLOPIDOGREL BISULFATE 75 MG/1
75 TABLET, FILM COATED ORAL DAILY
Qty: 0 | Refills: 0 | Status: DISCONTINUED | OUTPATIENT
Start: 2018-08-23 | End: 2018-08-29

## 2018-08-23 RX ORDER — GABAPENTIN 400 MG/1
300 CAPSULE ORAL
Qty: 0 | Refills: 0 | Status: DISCONTINUED | OUTPATIENT
Start: 2018-08-23 | End: 2018-08-29

## 2018-08-23 RX ORDER — DOCUSATE SODIUM 100 MG
100 CAPSULE ORAL THREE TIMES A DAY
Qty: 0 | Refills: 0 | Status: DISCONTINUED | OUTPATIENT
Start: 2018-08-23 | End: 2018-08-29

## 2018-08-23 RX ORDER — MIRTAZAPINE 45 MG/1
7.5 TABLET, ORALLY DISINTEGRATING ORAL AT BEDTIME
Qty: 0 | Refills: 0 | Status: DISCONTINUED | OUTPATIENT
Start: 2018-08-23 | End: 2018-08-29

## 2018-08-23 RX ORDER — HEPARIN SODIUM 5000 [USP'U]/ML
5000 INJECTION INTRAVENOUS; SUBCUTANEOUS EVERY 8 HOURS
Qty: 0 | Refills: 0 | Status: DISCONTINUED | OUTPATIENT
Start: 2018-08-23 | End: 2018-08-29

## 2018-08-23 RX ORDER — MORPHINE SULFATE 50 MG/1
2 CAPSULE, EXTENDED RELEASE ORAL ONCE
Qty: 0 | Refills: 0 | Status: DISCONTINUED | OUTPATIENT
Start: 2018-08-23 | End: 2018-08-23

## 2018-08-23 RX ORDER — SENNA PLUS 8.6 MG/1
1 TABLET ORAL AT BEDTIME
Qty: 0 | Refills: 0 | Status: DISCONTINUED | OUTPATIENT
Start: 2018-08-23 | End: 2018-08-29

## 2018-08-23 RX ORDER — MIRTAZAPINE 45 MG/1
0 TABLET, ORALLY DISINTEGRATING ORAL
Qty: 60 | Refills: 0 | COMMUNITY

## 2018-08-23 RX ADMIN — GABAPENTIN 300 MILLIGRAM(S): 400 CAPSULE ORAL at 23:30

## 2018-08-23 RX ADMIN — MIRTAZAPINE 7.5 MILLIGRAM(S): 45 TABLET, ORALLY DISINTEGRATING ORAL at 23:31

## 2018-08-23 RX ADMIN — Medication 100 MILLIGRAM(S): at 23:33

## 2018-08-23 RX ADMIN — SENNA PLUS 1 TABLET(S): 8.6 TABLET ORAL at 23:32

## 2018-08-23 NOTE — H&P ADULT - ATTENDING COMMENTS
Patient seen and examined at the bed side. not in distress.   Agree with above note.   Inability to ambulate secondary to the right hip pain radiates to the right thigh.   Possible avascular necrosis. Chronic steroid user.   Follow up post MRI with ortho for any intervention needed ?

## 2018-08-23 NOTE — ED PROVIDER NOTE - PROGRESS NOTE DETAILS
based on history and Physical exam patient may have osteonecrosis of right hip vs occult fracture vs osteoarthritis. Will get X ray of both hips, R femur x ray, cbc, bmp, esr labs and follow. Will consider MRI if X ray negative X ray, labs unremarkable--> will admit patient to medicine service for pain control, PT/rehab, inpatient MRI of right hip

## 2018-08-23 NOTE — H&P ADULT - NSHPPHYSICALEXAM_GEN_ALL_CORE
Vital Signs Last 24 Hrs  T(C): 36.9 (23 Aug 2018 16:22), Max: 36.9 (23 Aug 2018 16:22)  T(F): 98.5 (23 Aug 2018 16:22), Max: 98.5 (23 Aug 2018 16:22)  HR: 78 (23 Aug 2018 16:22) (71 - 78)  BP: 166/91 (23 Aug 2018 16:22) (141/70 - 166/91)  RR: 18 (23 Aug 2018 16:22) (18 - 18)  SpO2: 98% (23 Aug 2018 16:22) (93% - 98%)      Physical Examination:   GENERAL: NAD, well-developed  HEAD:  Atraumatic, Normocephalic  EYES: conjunctiva and sclera clear  NECK: Supple, No JVD  CHEST/LUNG: Clear to auscultation bilaterally; No wheeze  HEART: Regular rate and rhythm; No murmurs, rubs, or gallops  ABDOMEN: Soft, Nontender, Nondistended; Bowel sounds present  Back: non tender thoracic and lumbar spine, some tenderness in R flank and R buttock area  EXTREMITIES:  2+ Peripheral Pulses, No clubbing, cyanosis, or edema    R thigh and knee active ROM limited by pain, no limitation of passive ROM    R hip nonerythematous, tender in groin area and anterior part of thigh  PSYCH: AAOx3  NEUROLOGY: non-focal except for as noted in extremities  SKIN: No rashes or lesions

## 2018-08-23 NOTE — ED ADULT NURSE REASSESSMENT NOTE - NS ED NURSE REASSESS COMMENT FT1
Pt A&OX4, refusing IV access. Pt instructed that she is admitted and may need an IV. Pt verbalizes understanding and is still refusing IV. MAR #[id number] aware, pt OK to have no IV access.

## 2018-08-23 NOTE — ED ADULT NURSE NOTE - NSIMPLEMENTINTERV_GEN_ALL_ED
Implemented All Fall with Harm Risk Interventions:  [place] to call system. Call bell, personal items and telephone within reach. Instruct patient to call for assistance. Room bathroom lighting operational. Non-slip footwear when patient is off stretcher. Physically safe environment: no spills, clutter or unnecessary equipment. Stretcher in lowest position, wheels locked, appropriate side rails in place. Provide visual cue, wrist band, yellow gown, etc. Monitor gait and stability. Monitor for mental status changes and reorient to person, place, and time. Review medications for side effects contributing to fall risk. Reinforce activity limits and safety measures with patient and family. Provide visual clues: red socks.

## 2018-08-23 NOTE — PATIENT PROFILE ADULT. - BLOOD TRANSFUSION, PREVIOUS, PROFILE
***** This form is a permanent part of the medical record*****



5/23/18  Dr. Campo,



Would you please clarify if there is an associated diagnosis or not to go along 
with the CT of the Chest findings.



Patient with Multiple Myeloma is admitted with Spinal Cord Compression. CT chest
: - Findings highly suspicious for scattered, bilateral acute pneumonia, 
greatest  in the left lower lobe medially. WBC 6.0 with a L shift. Started on 
Maxipime. 

          

Clarification of your documentation is requested to better reflect the severity 
of illness and intensity of treatment of your patient.  



Indicators present      

  

PHYSICIAN'S RESPONSE





Based on your medical judgment of the clinical indicators outlined above please 
clarify the following:



[]  Practitioner response 



[]  If unable to determine, please check the box, sign and date.  





Present On Admission (POA) Indicator:





[] Present at the time of admission 



[] Not present at the time of admission



[] Clinically Undetermined









In responding to this query, please exercise your independent professional 
judgment.  The fact that a question is asked does not imply that any particular 
answer is desired or expected.  Thank you for your clarification on this 
documentation.



If you have any questions please call:ext [phone]

* Thank you,

   Azalea Ingram RN

   CDMP
MTDD
no

## 2018-08-23 NOTE — ED PROVIDER NOTE - NS ED ROS FT
Review of Systems:  	•	CONSTITUTIONAL - no fever, no diaphoresis, no weight change  	•	SKIN - no rash  	•	HEMATOLOGIC - no bleeding, no bruising  	•	EYES - no eye pain, no blurred vision  	•	ENT - no change in hearing, no pain  	•	RESPIRATORY - no shortness of breath, no cough  	•	CARDIAC - no chest pain, no palpitations  	•	GI - no abd pain, no nausea, no vomiting, no diarrhea, no constipation, no bleeding  	•	 - no dysuria, no hematuria, no flank pain, no urinary retention  	•	MUSCULOSKELETAL - right leg pain  	•	NEUROLOGIC - no weakness, no headache, no paresthesias, no dizziness

## 2018-08-23 NOTE — ED PROVIDER NOTE - PMH
Abdominal aortic aneurysm (AAA) without rupture    Essential hypertension    Falls    Polymyalgia rheumatica    Smoker    TIA (transient ischemic attack)

## 2018-08-23 NOTE — ED ADULT NURSE NOTE - CHPI ED NUR SYMPTOMS NEG
no fever/no difficulty bearing weight/no numbness/no tingling/no weakness/no deformity/no bruising/no back pain

## 2018-08-23 NOTE — ED PROVIDER NOTE - PHYSICAL EXAMINATION
GENERAL: NAD, well-developed  HEAD:  Atraumatic, Normocephalic  EYES: conjunctiva and sclera clear  NECK: Supple, No JVD  CHEST/LUNG: Clear to auscultation bilaterally; No wheeze  HEART: Regular rate and rhythm; No murmurs, rubs, or gallops  ABDOMEN: Soft, Nontender, Nondistended; Bowel sounds present  Back: non tender thoracic and lumbar spine, some tenderness in R flank and R buttock area  EXTREMITIES:  2+ Peripheral Pulses, No clubbing, cyanosis, or edema  R thigh and knee active ROM limited by pain, no limitation of passive ROM  R hip nonerythematous, tender in groin area and anterior part of thigh  PSYCH: AAOx3  NEUROLOGY: non-focal except for as noted in extremities  SKIN: No rashes or lesions

## 2018-08-23 NOTE — ED ADULT NURSE NOTE - OBJECTIVE STATEMENT
Pt AOx3, ambulatory, c/o right thigh and leg pain x 1 week pt denies fall/trauma, numbness or tingling, SOB, chest pain.

## 2018-08-23 NOTE — ED PROVIDER NOTE - OBJECTIVE STATEMENT
82 F w/ h/o HTN, TIA in 04/18, polymyalgia rheumatica on 5mg q24h prednisone, active smoker 1 PPD presented from home w/ c/o worsening R groin and thigh pain for 1 week. Started suddenly, 9/10, going upto R thigh, knife like sharp, only with movement of R hip and R knee. She lives alone, independent w/ ADLs, gets some help from her grandson who lives downstairs. Patient has been getting PT since her diagnosis of TIA in 04/18 for gait dysfunction. SHe has been doing well and has been walking even without walker now but since the pain started she has pain on weight bearing on R leg so has been using walker again for a week. Denies any fever, chills, weakness, trauma to leg, any falls, any h/o cancer, any vision changes, any pain in L thigh/back pain/flank pain. Never had pain like this before.

## 2018-08-23 NOTE — ED PROVIDER NOTE - ATTENDING CONTRIBUTION TO CARE
83 yo f with pmh of polymyalgia rheumatica, on a prednisone taper, htn, and tia, biba with c/o right hip, thigh and knee pain.  pt says has been ongoing for about 1 week.  pain when bearing weight.  no falls.  pt says now using a dining chair that has wheels to get around the house.  denies leg swelling.  no cp, no sob, no abd pain.  exam: nad, ncat, perrl, eomi, mmm, rrr, ctab, abd soft, nt,nd aox3, right hip ttp, mildly ttp right knee, no gross deformity, no leg swelling, no calf tenderness, pt has pain at hip and right thigh with PROM imp: pt with h/o rheumatologic disease on chronic prednisone, has localized pain to the right hip, will xray and obtain labs. pt is having difficulty ambulating and is a fall risk

## 2018-08-23 NOTE — H&P ADULT - ASSESSMENT
82 F w/ h/o HTN, TIA in 04/18, h/o AAA repair,  polymyalgia rheumatica on 5mg q24h prednisone, active smoker 1 PPD presented from home w/ c/o worsening R groin and thigh pain for 1 week.      #Right hip pain PMR vs AVN of femur on context of steroid use ( X-ray was normal)   -Pain control  -consider Nuclear bone scan or MRI if still suspicious for AVN femur  -c/w steroid  -Pain management  -Pt/rehab    #HTN:  -c/w losaratan      #TIA:  -c/w plavix    # 82 F w/ h/o HTN, TIA in 04/18, h/o AAA repair,  polymyalgia rheumatica on 5mg q24h prednisone, active smoker 1 PPD presented from home w/ c/o worsening R groin and thigh pain for 1 week.      #Right hip pain PMR vs AVN of femur on context of steroid use ( X-ray was normal)   -Pain control  -consider Nuclear bone scan or MRI if still suspicious for AVN femur  -c/w steroid  -Pt/rehab    #HTN:  -c/w losaratan      #TIA:  -c/w plavix    #CKD:  cr at baseline    #DVT PPX   from home  living alone

## 2018-08-23 NOTE — H&P ADULT - NSHPLABSRESULTS_GEN_ALL_CORE
14.2   8.65  )-----------( 221      ( 23 Aug 2018 17:00 )             44.4       08-23    142  |  103  |  33<H>  ----------------------------<  87  4.9   |  23  |  1.7<H>    Ca    10.0      23 Aug 2018 17:00      < from: Xray Femur 2 Views, Right (08.23.18 @ 18:36) >    Impression:    No radiographic evidence of acute acute osseous abnormality or   osteonecrosis of right hip.    < end of copied text >    < from: Xray Hip w/ Pelvis 3-4 Views, Bilateral (08.23.18 @ 18:36) >    Impression:    Osteopenia, without radiographic evidence of acute fractureor   osteonecrosis of bilateral hips.    < end of copied text >

## 2018-08-23 NOTE — H&P ADULT - HISTORY OF PRESENT ILLNESS
82 F w/ h/o HTN, TIA in 04/18, h/o AAA repair,  polymyalgia rheumatica on 5mg q24h prednisone, active smoker 1 PPD presented from home w/ c/o worsening R groin and thigh pain for 1 week. Started suddenly, 9/10, going upto R thigh, knife like sharp, only with movement of R hip and R knee. She lives alone, independent w/ ADLs, gets some help from her grandson who lives downstairs. Patient has been getting PT since her diagnosis of TIA in 04/18 for gait dysfunction. She has been doing well and has been walking even without walker now but since the pain started she has pain on weight bearing on R leg so has been using walker again for a week. Denies any fever, chills, weakness, trauma to leg, any falls, any h/o cancer, any

## 2018-08-24 LAB
ANION GAP SERPL CALC-SCNC: 16 MMOL/L — HIGH (ref 7–14)
BASOPHILS # BLD AUTO: 0.04 K/UL — SIGNIFICANT CHANGE UP (ref 0–0.2)
BASOPHILS NFR BLD AUTO: 0.5 % — SIGNIFICANT CHANGE UP (ref 0–1)
BUN SERPL-MCNC: 32 MG/DL — HIGH (ref 10–20)
CALCIUM SERPL-MCNC: 9.6 MG/DL — SIGNIFICANT CHANGE UP (ref 8.5–10.1)
CHLORIDE SERPL-SCNC: 105 MMOL/L — SIGNIFICANT CHANGE UP (ref 98–110)
CO2 SERPL-SCNC: 23 MMOL/L — SIGNIFICANT CHANGE UP (ref 17–32)
CREAT SERPL-MCNC: 1.7 MG/DL — HIGH (ref 0.7–1.5)
EOSINOPHIL # BLD AUTO: 0.17 K/UL — SIGNIFICANT CHANGE UP (ref 0–0.7)
EOSINOPHIL NFR BLD AUTO: 2.3 % — SIGNIFICANT CHANGE UP (ref 0–8)
GLUCOSE SERPL-MCNC: 83 MG/DL — SIGNIFICANT CHANGE UP (ref 70–99)
HCT VFR BLD CALC: 43.2 % — SIGNIFICANT CHANGE UP (ref 37–47)
HGB BLD-MCNC: 13.7 G/DL — SIGNIFICANT CHANGE UP (ref 12–16)
IMM GRANULOCYTES NFR BLD AUTO: 0.3 % — SIGNIFICANT CHANGE UP (ref 0.1–0.3)
LYMPHOCYTES # BLD AUTO: 1.98 K/UL — SIGNIFICANT CHANGE UP (ref 1.2–3.4)
LYMPHOCYTES # BLD AUTO: 27 % — SIGNIFICANT CHANGE UP (ref 20.5–51.1)
MCHC RBC-ENTMCNC: 29 PG — SIGNIFICANT CHANGE UP (ref 27–31)
MCHC RBC-ENTMCNC: 31.7 G/DL — LOW (ref 32–37)
MCV RBC AUTO: 91.5 FL — SIGNIFICANT CHANGE UP (ref 81–99)
MONOCYTES # BLD AUTO: 0.5 K/UL — SIGNIFICANT CHANGE UP (ref 0.1–0.6)
MONOCYTES NFR BLD AUTO: 6.8 % — SIGNIFICANT CHANGE UP (ref 1.7–9.3)
NEUTROPHILS # BLD AUTO: 4.61 K/UL — SIGNIFICANT CHANGE UP (ref 1.4–6.5)
NEUTROPHILS NFR BLD AUTO: 63.1 % — SIGNIFICANT CHANGE UP (ref 42.2–75.2)
PLATELET # BLD AUTO: 207 K/UL — SIGNIFICANT CHANGE UP (ref 130–400)
POTASSIUM SERPL-MCNC: 4.1 MMOL/L — SIGNIFICANT CHANGE UP (ref 3.5–5)
POTASSIUM SERPL-SCNC: 4.1 MMOL/L — SIGNIFICANT CHANGE UP (ref 3.5–5)
RBC # BLD: 4.72 M/UL — SIGNIFICANT CHANGE UP (ref 4.2–5.4)
RBC # FLD: 13.5 % — SIGNIFICANT CHANGE UP (ref 11.5–14.5)
SODIUM SERPL-SCNC: 144 MMOL/L — SIGNIFICANT CHANGE UP (ref 135–146)
WBC # BLD: 7.32 K/UL — SIGNIFICANT CHANGE UP (ref 4.8–10.8)
WBC # FLD AUTO: 7.32 K/UL — SIGNIFICANT CHANGE UP (ref 4.8–10.8)

## 2018-08-24 RX ORDER — TRAMADOL HYDROCHLORIDE 50 MG/1
25 TABLET ORAL
Qty: 0 | Refills: 0 | Status: DISCONTINUED | OUTPATIENT
Start: 2018-08-24 | End: 2018-08-29

## 2018-08-24 RX ADMIN — Medication 100 MILLIGRAM(S): at 13:40

## 2018-08-24 RX ADMIN — CLOPIDOGREL BISULFATE 75 MILLIGRAM(S): 75 TABLET, FILM COATED ORAL at 12:22

## 2018-08-24 RX ADMIN — MIRTAZAPINE 7.5 MILLIGRAM(S): 45 TABLET, ORALLY DISINTEGRATING ORAL at 21:27

## 2018-08-24 RX ADMIN — SENNA PLUS 1 TABLET(S): 8.6 TABLET ORAL at 21:27

## 2018-08-24 RX ADMIN — LOSARTAN POTASSIUM 50 MILLIGRAM(S): 100 TABLET, FILM COATED ORAL at 06:18

## 2018-08-24 RX ADMIN — GABAPENTIN 300 MILLIGRAM(S): 400 CAPSULE ORAL at 12:21

## 2018-08-24 RX ADMIN — AMLODIPINE BESYLATE 5 MILLIGRAM(S): 2.5 TABLET ORAL at 06:18

## 2018-08-24 RX ADMIN — GABAPENTIN 300 MILLIGRAM(S): 400 CAPSULE ORAL at 18:30

## 2018-08-24 RX ADMIN — Medication 100 MILLIGRAM(S): at 06:18

## 2018-08-24 RX ADMIN — Medication 5 MILLIGRAM(S): at 06:59

## 2018-08-24 RX ADMIN — Medication 100 MILLIGRAM(S): at 21:27

## 2018-08-24 RX ADMIN — HEPARIN SODIUM 5000 UNIT(S): 5000 INJECTION INTRAVENOUS; SUBCUTANEOUS at 21:27

## 2018-08-24 RX ADMIN — GABAPENTIN 300 MILLIGRAM(S): 400 CAPSULE ORAL at 06:18

## 2018-08-24 NOTE — PROGRESS NOTE ADULT - ASSESSMENT
82 F w/ h/o HTN, TIA in 04/18, h/o AAA repair,  polymyalgia rheumatica on 5mg q24h prednisone, active smoker 1 PPD presented from home w/ worsening R groin and thigh pain for 1 week.    # Right hip pain PMR flare vs AVN of femoral head in context of steroid use  - X-ray hip and knee negative  - MRI pending - f/u results  - Ortho following  - Pain control  - c/w steroid  - Physiatry evaluation - STR vs home with VNS    # HTN:  - c/w losaratan    # TIA:  - c/w plavix    # CKD stage 4  - Cr at baseline    # DVT ppx - Heparin SC    Dispo: Resides alone at home - STR/SNF vs. home with VNS

## 2018-08-24 NOTE — CONSULT NOTE ADULT - SUBJECTIVE AND OBJECTIVE BOX
82 F w/ h/o HTN, TIA in 04/18, h/o AAA repair,  polymyalgia rheumatica on 5mg q24h prednisone, active smoker 1 PPD presented from home w/ c/o worsening R groin and thigh pain for 1 week. Started suddenly,, R thigh, knife like sharp, only with movement of R hip and R knee. She lives alone, independent w/ ADLs,. Patient has been getting PT since her diagnosis of TIA in 04/18 for gait dysfunction. She has been doing well and has been walking even without walker now but since the pain started she has pain on weight bearing on R leg so has been using walker again for a week. Denies any fever, chills, weakness, trauma to leg, any falls, any h/o cancer, any   knee xrays are neg   pelvis neg   MRI's are pending   will follow 82 F w/ h/o HTN, TIA in 04/18, h/o AAA repair,  polymyalgia rheumatica on 5mg q24h prednisone, active smoker 1 PPD presented from home w/ c/o worsening R groin and thigh pain for 1 week. Started suddenly,, R thigh, knife like sharp, only with movement of R hip and R knee. She lives alone, independent w/ ADLs,. Patient has been getting PT since her diagnosis of TIA in 04/18 for gait dysfunction. She has been doing well and has been walking even without walker now but since the pain started she has pain on weight bearing on R leg so has been using walker again for a week. Denies any fever, chills, weakness, trauma to leg, any falls, any h/o cancer, any   knee xrays are neg   pelvis neg   MRI's are pending   will follow  patient seen and examined   pain in hip with rotation   xrays mild OA   awaiting MRI

## 2018-08-24 NOTE — CONSULT NOTE ADULT - SUBJECTIVE AND OBJECTIVE BOX
Patient is a 82y old  Female who presents with a chief complaint of Right hip pain (23 Aug 2018 20:44)    HPI:  82 F w/ h/o HTN, TIA in 04/18, h/o AAA repair,  polymyalgia rheumatica on 5mg q24h prednisone, active smoker 1 PPD presented from home w/ c/o worsening R groin and thigh pain for 1 week. Started suddenly, 9/10, going upto R thigh, knife like sharp, only with movement of R hip and R knee. She lives alone, independent w/ ADLs, gets some help from her grandson who lives downstairs. Patient has been getting PT since her diagnosis of TIA in 04/18 for gait dysfunction. She has been doing well and has been walking even without walker now but since the pain started she has pain on weight bearing on R leg so has been using walker again for a week. Denies any fever, chills, weakness, trauma to leg, any falls, any h/o cancer, any (23 Aug 2018 20:44)      PAST MEDICAL & SURGICAL HISTORY:  Smoker  Polymyalgia rheumatica  Abdominal aortic aneurysm (AAA) without rupture  Essential hypertension  Falls  TIA (transient ischemic attack)  H/O abdominal aortic aneurysm repair      Hospital Course: Xrays R hip/ pelvis neg- Seen By Ortho awaiting MRI- ?AVN    TODAY'S SUBJECTIVE & REVIEW OF SYMPTOMS:     Constitutional WNL   Cardio WNL   Resp WNL   GI WNL  Heme WNL  Endo WNL  Skin WNL  MSK R hip pain  Neuro WNL  Cognitive WNL  Psych WNL      MEDICATIONS  (STANDING):  amLODIPine   Tablet 5 milliGRAM(s) Oral daily  clopidogrel Tablet 75 milliGRAM(s) Oral daily  docusate sodium 100 milliGRAM(s) Oral three times a day  gabapentin 300 milliGRAM(s) Oral four times a day  heparin  Injectable 5000 Unit(s) SubCutaneous every 8 hours  losartan 50 milliGRAM(s) Oral daily  mirtazapine 7.5 milliGRAM(s) Oral at bedtime  predniSONE   Tablet 5 milliGRAM(s) Oral daily  senna 1 Tablet(s) Oral at bedtime    MEDICATIONS  (PRN):  traMADol 50 milliGRAM(s) Oral two times a day PRN Moderate Pain (4 - 6)      FAMILY HISTORY:  No pertinent family history in first degree relatives      Allergies    Fosamax (Other (Moderate))  Novacet (Rash)  penicillin (Rash)  Sulfac 10% (Rash)  Tylenol (Pruritus)    Intolerances        SOCIAL HISTORY:    [    ] Etoh  [  x  ] Smoking  [    ] Substance abuse     Home Environment:  [ x   ] Home Alone  [    ] Lives with Family  [    ] Home Health Aid    Dwelling:  [  x  ] Apartment  [    ] Private House  [    ] Adult Home  [    ] Skilled Nursing Facility      [    ] Short Term  [    ] Long Term  [  x  ] Stairs                           [    ] Elevator     FUNCTIONAL STATUS PTA: (Check all that apply)  Ambulation: [   x  ]Independent    [    ] Dependent     [    ] Non-Ambulatory  Assistive Device: [    ] SA Cane  [    ]  Q Cane  [  x  ] Walker  [    ]  Wheelchair  ADL : [  x  ] Independent  [    ]  Dependent   NO DEVICE TILL AROUND ONE WEEK AGO- NOW USING WALKER    Vital Signs Last 24 Hrs  T(C): 36.1 (24 Aug 2018 07:20), Max: 36.9 (23 Aug 2018 16:22)  T(F): 97 (24 Aug 2018 07:20), Max: 98.5 (23 Aug 2018 16:22)  HR: 66 (24 Aug 2018 07:20) (58 - 78)  BP: 172/77 (24 Aug 2018 07:20) (141/70 - 180/83)  BP(mean): --  RR: 19 (24 Aug 2018 07:20) (18 - 19)  SpO2: 98% (23 Aug 2018 16:22) (93% - 98%)      PHYSICAL EXAM: Alert & Oriented X3  GENERAL: NAD, well-groomed, well-developed  HEAD:  Atraumatic, Normocephalic  EYES: EOMI, PERRLA, conjunctiva and sclera clear  NECK: Supple, No JVD, Normal thyroid  CHEST/LUNG: Clear  bilaterally; No rales, rhonchi, wheezing, or rubs  HEART: Regular rate and rhythm; No murmurs, rubs, or gallops  ABDOMEN: Soft, Nontender, Nondistended; Bowel sounds present  EXTREMITIES:  2+ Peripheral Pulses, No clubbing, cyanosis, or edema    NERVOUS SYSTEM:  Cranial Nerves 2-12 intact [x    ] Abnormal  [    ]  ROM: WFL all extremities [x    ]  Abnormal [     ]  Motor Strength: WFL all extremities  [    ]  Abnormal [ x   ]Gustavo on SLR, ext, abd,,add R hip- otherwise 5/5  Sensation: intact to light touch [x  ] Abnormal [    ]      FUNCTIONAL STATUS:  Bed Mobility: [   ]  Independent [    ]  Supervision [  x  ]  Needs Assistance [  ]  N/A  Transfers: [    ]  Independent [    ]  Supervision [    ]  Needs Assistance [   x ]  N/A    Ambulation:  [    ]  Independent [    ]  Supervision [    ]  Needs Assistance [ x   ]  N/A   ADL:  [    ]   Independent [ x   ] Requires Assistance [    ] N/A       LABS:                        13.7   7.32  )-----------( 207      ( 24 Aug 2018 05:36 )             43.2     08-24    144  |  105  |  32<H>  ----------------------------<  83  4.1   |  23  |  1.7<H>    Ca    9.6      24 Aug 2018 05:36            RADIOLOGY & ADDITIONAL STUDIES:

## 2018-08-24 NOTE — CONSULT NOTE ADULT - ASSESSMENT
IMPRESSION: Rehab of gait ab R hip pain?AVN    PRECAUTIONS: [    ] Cardiac  [ x   ] Respiratory  [    ] Seizures [    ] Contact Isolation  [    ] Droplet Isolation  [    ] Other    Weight Bearing Status: WBAT    RECOMMENDATION:    Out of Bed to Chair     DVT/Decubiti Prophylaxis    REHAB PLAN:     [    x ] Bedside P/T 3-5 times a week   [     ] Bedside O/T  2-3 times a week   [     ] No Rehab Therapy Indicated   [     ]  Speech Therapy   Conditioning/ROM                                 ADL  Bed Mobility                                            Conditioning/ROM  Transfers                                                  Bed Mobility  Sitting /Standing Balance                      Transfers                                        Gait Training                                            Sitting/Standing Balance  Stair Training [x   ]Applicable                 Home equipment Eval                                                                     Splinting  [   ] Only      GOALS:   ADL   [  x  ]   Independent         Transfers  [  x  ] Independent            Ambulation  [     ] Independent     [     ] With device                            [    ]  CG                                               [    ]  CG                                                    [     ] CG                            [    ] Min A                                          [    ] Min A                                                [     ] Min  A          DISCHARGE PLAN:   [     ]  Good candidate for Intensive Rehabilitation/Hospital based-4A [facility]                                             Will tolerate 3hrs Intensive Rehab Daily                                       [   x   ]  Short Term Rehab in Skilled Nursing Facility                           vs                                     [    x  ]  Home with Outpatient or  services                                         [      ]  Possible Candidate for Intensive Hospital based Rehab IMPRESSION: Rehab of gait ab R hip pain?AVN    PRECAUTIONS: [    ] Cardiac  [ x   ] Respiratory  [    ] Seizures [    ] Contact Isolation  [    ] Droplet Isolation  [    ] Other    Weight Bearing Status: NWB RLE- ADVANCE BASED ON MRI/ORTHO FU    RECOMMENDATION:    Out of Bed to Chair     DVT/Decubiti Prophylaxis    REHAB PLAN:     [    x ] Bedside P/T 3-5 times a week   [     ] Bedside O/T  2-3 times a week   [     ] No Rehab Therapy Indicated   [     ]  Speech Therapy   Conditioning/ROM                                 ADL  Bed Mobility                                            Conditioning/ROM  Transfers                                                  Bed Mobility  Sitting /Standing Balance                      Transfers                                        Gait Training                                            Sitting/Standing Balance  Stair Training [x   ]Applicable                 Home equipment Eval                                                                     Splinting  [   ] Only      GOALS:   ADL   [  x  ]   Independent         Transfers  [  x  ] Independent            Ambulation  [     ] Independent     [     ] With device                            [    ]  CG                                               [    ]  CG                                                    [     ] CG                            [    ] Min A                                          [    ] Min A                                                [     ] Min  A          DISCHARGE PLAN:   [     ]  Good candidate for Intensive Rehabilitation/Hospital based-4A [facility]                                             Will tolerate 3hrs Intensive Rehab Daily                                       [   x   ]  Short Term Rehab in Skilled Nursing Facility                           vs                                     [    x  ]  Home with Outpatient or  services                                         [      ]  Possible Candidate for Intensive Hospital based Rehab

## 2018-08-24 NOTE — PROGRESS NOTE ADULT - SUBJECTIVE AND OBJECTIVE BOX
SUBJECTIVE:    Patient is a 82y old Female who presents with a chief complaint of Right hip pain (23 Aug 2018 20:44)    Currently admitted to medicine with the primary diagnosis of Right hip pain     Today is hospital day 1d. This morning she is resting comfortably in bed and reports no new issues or overnight events. Patient reports pain in R hip/groin area.     PAST MEDICAL & SURGICAL HISTORY  Smoker  Polymyalgia rheumatica  Abdominal aortic aneurysm (AAA) without rupture  Essential hypertension  Falls  TIA (transient ischemic attack)  H/O abdominal aortic aneurysm repair    SOCIAL HISTORY:  Negative for smoking/alcohol/drug use.     ALLERGIES:  Fosamax (Other (Moderate))  Novacet (Rash)  penicillin (Rash)  Sulfac 10% (Rash)  Tylenol (Pruritus)    MEDICATIONS:  STANDING MEDICATIONS  amLODIPine   Tablet 5 milliGRAM(s) Oral daily  clopidogrel Tablet 75 milliGRAM(s) Oral daily  docusate sodium 100 milliGRAM(s) Oral three times a day  gabapentin 300 milliGRAM(s) Oral four times a day  heparin  Injectable 5000 Unit(s) SubCutaneous every 8 hours  losartan 50 milliGRAM(s) Oral daily  mirtazapine 7.5 milliGRAM(s) Oral at bedtime  predniSONE   Tablet 5 milliGRAM(s) Oral daily  senna 1 Tablet(s) Oral at bedtime  traMADol 25 milliGRAM(s) Oral two times a day    PRN MEDICATIONS  traMADol 25 milliGRAM(s) Oral two times a day PRN    VITALS:   T(F): 97  HR: 66  BP: 172/77  RR: 19  SpO2: --    LABS:                        13.7   7.32  )-----------( 207      ( 24 Aug 2018 05:36 )             43.2     08-24    144  |  105  |  32<H>  ----------------------------<  83  4.1   |  23  |  1.7<H>    Ca    9.6      24 Aug 2018 05:36            Sedimentation Rate, Erythrocyte: 17 mm/hr (08-23-18 @ 17:00)          RADIOLOGY:    < from: Xray Femur 2 Views, Right (08.23.18 @ 18:36) >  Impression:    No radiographic evidence of acute acute osseous abnormality or   osteonecrosis of right hip.    < end of copied text >    < from: Xray Hip w/ Pelvis 3-4 Views, Bilateral (08.23.18 @ 18:36) >  Impression:    Osteopenia, without radiographic evidence of acute fractureor   osteonecrosis of bilateral hips.    < end of copied text >    < from: Xray Chest 1 View-PORTABLE IMMEDIATE (08.23.18 @ 18:35) >  Impression:      No radiographic evidence of acute cardiopulmonary disease.    Unchanged right basilar opacity, likely chronic.    < end of copied text >    < from: Xray Knee 3 Views, Right (08.23.18 @ 18:35) >  Impression:  No evidence of acute osseous abnormality.    < end of copied text >    PHYSICAL EXAM:  GEN: No acute distress  LUNGS: Clear to auscultation bilaterally   HEART: Regular  ABD: Soft, non-tender, non-distended.  EXT: NC/NC/NE/2+PP/FELICIANO/Skin Intact, RLE active ROM limited by pain, passive ROM not limited  NEURO: AAOX3    Intravenous access: PIV

## 2018-08-25 RX ADMIN — GABAPENTIN 300 MILLIGRAM(S): 400 CAPSULE ORAL at 11:48

## 2018-08-25 RX ADMIN — AMLODIPINE BESYLATE 5 MILLIGRAM(S): 2.5 TABLET ORAL at 05:46

## 2018-08-25 RX ADMIN — HEPARIN SODIUM 5000 UNIT(S): 5000 INJECTION INTRAVENOUS; SUBCUTANEOUS at 21:51

## 2018-08-25 RX ADMIN — Medication 5 MILLIGRAM(S): at 05:47

## 2018-08-25 RX ADMIN — CLOPIDOGREL BISULFATE 75 MILLIGRAM(S): 75 TABLET, FILM COATED ORAL at 11:48

## 2018-08-25 RX ADMIN — Medication 100 MILLIGRAM(S): at 05:46

## 2018-08-25 RX ADMIN — MIRTAZAPINE 7.5 MILLIGRAM(S): 45 TABLET, ORALLY DISINTEGRATING ORAL at 21:51

## 2018-08-25 RX ADMIN — LOSARTAN POTASSIUM 50 MILLIGRAM(S): 100 TABLET, FILM COATED ORAL at 05:46

## 2018-08-25 RX ADMIN — GABAPENTIN 300 MILLIGRAM(S): 400 CAPSULE ORAL at 00:29

## 2018-08-25 RX ADMIN — GABAPENTIN 300 MILLIGRAM(S): 400 CAPSULE ORAL at 23:44

## 2018-08-25 RX ADMIN — Medication 100 MILLIGRAM(S): at 13:57

## 2018-08-25 RX ADMIN — TRAMADOL HYDROCHLORIDE 25 MILLIGRAM(S): 50 TABLET ORAL at 05:48

## 2018-08-25 RX ADMIN — GABAPENTIN 300 MILLIGRAM(S): 400 CAPSULE ORAL at 05:46

## 2018-08-25 RX ADMIN — GABAPENTIN 300 MILLIGRAM(S): 400 CAPSULE ORAL at 17:15

## 2018-08-25 NOTE — PROGRESS NOTE ADULT - ASSESSMENT
82 F w/ h/o HTN, TIA in 04/18, h/o AAA repair,  polymyalgia rheumatica on 5mg q24h prednisone, active smoker 1 PPD presented from home w/ worsening R groin and thigh pain for 1 week.    # Right hip pain PMR flare vs AVN of femoral head in context of steroid use  - Ortho following  - Pain control  - c/w steroid  -Pt-rehab eval    # HTN:  - c/w losaratan    # TIA:  - c/w plavix    # CKD stage 4  - Cr at baseline    # DVT, GI PPX    #Dispo: Follow up MRI findings- Home wih Home care Vs STR  May benefit rehab    Pager No. [phone]

## 2018-08-25 NOTE — PROGRESS NOTE ADULT - SUBJECTIVE AND OBJECTIVE BOX
Chief Complaint:  Patient is a 82y old  Female who presents with a chief complaint of Right hip pain (23 Aug 2018 20:44)      Interval Events:     Allergies:  Fosamax (Other (Moderate))  Novacet (Rash)  penicillin (Rash)  Sulfac 10% (Rash)  Tylenol (Pruritus)      Home Medications:    Hospital Medications:  amLODIPine   Tablet 5 milliGRAM(s) Oral daily  clopidogrel Tablet 75 milliGRAM(s) Oral daily  docusate sodium 100 milliGRAM(s) Oral three times a day  gabapentin 300 milliGRAM(s) Oral four times a day  heparin  Injectable 5000 Unit(s) SubCutaneous every 8 hours  losartan 50 milliGRAM(s) Oral daily  mirtazapine 7.5 milliGRAM(s) Oral at bedtime  predniSONE   Tablet 5 milliGRAM(s) Oral daily  senna 1 Tablet(s) Oral at bedtime  traMADol 25 milliGRAM(s) Oral two times a day  traMADol 25 milliGRAM(s) Oral two times a day PRN      PMHX/PSHX:  Smoker  Polymyalgia rheumatica  Abdominal aortic aneurysm (AAA) without rupture  Essential hypertension  Falls  TIA (transient ischemic attack)  H/O abdominal aortic aneurysm repair      Family history:  No pertinent family history in first degree relatives      ROS:   As mentioned below      PHYSICAL EXAM:   Vital Signs:  Vital Signs Last 24 Hrs  T(C): 37 (25 Aug 2018 16:00), Max: 37 (25 Aug 2018 16:00)  T(F): 98.6 (25 Aug 2018 16:00), Max: 98.6 (25 Aug 2018 16:00)  HR: 68 (25 Aug 2018 16:00) (68 - 75)  BP: 157/85 (25 Aug 2018 16:00) (153/83 - 171/89)  BP(mean): --  RR: 17 (25 Aug 2018 16:00) (17 - 18)  SpO2: 94% (25 Aug 2018 05:40) (94% - 94%)  Daily     Daily     GENERAL:  NAD  HEENT:  NC/AT,  No Thyromegaly  CHEST:  CTA B/L  HEART:  S1, S2- No M, R, G  ABDOMEN:  Soft, NT, ND  EXTEREMITIES:  no cyanosis,clubbing or edema  SKIN:  NAD  NEURO:  Grossly Nr.    LABS:                        13.7   7.32  )-----------( 207      ( 24 Aug 2018 05:36 )             43.2     08-24    144  |  105  |  32<H>  ----------------------------<  83  4.1   |  23  |  1.7<H>    Ca    9.6      24 Aug 2018 05:36                Imaging:

## 2018-08-26 RX ADMIN — HEPARIN SODIUM 5000 UNIT(S): 5000 INJECTION INTRAVENOUS; SUBCUTANEOUS at 13:43

## 2018-08-26 RX ADMIN — CLOPIDOGREL BISULFATE 75 MILLIGRAM(S): 75 TABLET, FILM COATED ORAL at 12:11

## 2018-08-26 RX ADMIN — HEPARIN SODIUM 5000 UNIT(S): 5000 INJECTION INTRAVENOUS; SUBCUTANEOUS at 21:22

## 2018-08-26 RX ADMIN — GABAPENTIN 300 MILLIGRAM(S): 400 CAPSULE ORAL at 18:26

## 2018-08-26 RX ADMIN — AMLODIPINE BESYLATE 5 MILLIGRAM(S): 2.5 TABLET ORAL at 05:31

## 2018-08-26 RX ADMIN — MIRTAZAPINE 7.5 MILLIGRAM(S): 45 TABLET, ORALLY DISINTEGRATING ORAL at 21:22

## 2018-08-26 RX ADMIN — GABAPENTIN 300 MILLIGRAM(S): 400 CAPSULE ORAL at 05:32

## 2018-08-26 RX ADMIN — LOSARTAN POTASSIUM 50 MILLIGRAM(S): 100 TABLET, FILM COATED ORAL at 05:31

## 2018-08-26 RX ADMIN — HEPARIN SODIUM 5000 UNIT(S): 5000 INJECTION INTRAVENOUS; SUBCUTANEOUS at 05:32

## 2018-08-26 RX ADMIN — GABAPENTIN 300 MILLIGRAM(S): 400 CAPSULE ORAL at 12:11

## 2018-08-26 RX ADMIN — GABAPENTIN 300 MILLIGRAM(S): 400 CAPSULE ORAL at 23:53

## 2018-08-26 RX ADMIN — Medication 5 MILLIGRAM(S): at 05:31

## 2018-08-26 NOTE — PROGRESS NOTE ADULT - SUBJECTIVE AND OBJECTIVE BOX
Pt seen and examined at bedside. Pt reports right hip pain upon weight bearing. No events overnight.     VITAL SIGNS (Last 24 hrs):  T(C): 36.7 (08-26-18 @ 07:56), Max: 37 (08-25-18 @ 16:00)  HR: 50 (08-26-18 @ 07:56) (50 - 71)  BP: 144/84 (08-26-18 @ 07:56) (144/84 - 170/85)  RR: 18 (08-26-18 @ 05:34) (17 - 18)  SpO2: --  Wt(kg): --  Daily     Daily     I&O's Summary    25 Aug 2018 07:01  -  26 Aug 2018 07:00  --------------------------------------------------------  IN: 0 mL / OUT: 300 mL / NET: -300 mL    26 Aug 2018 07:01  -  26 Aug 2018 12:45  --------------------------------------------------------  IN: 320 mL / OUT: 400 mL / NET: -80 mL        PHYSICAL EXAM:  GENERAL: NAD, well-developed  HEAD:  Atraumatic, Normocephalic  EYES: EOMI, PERRLA, conjunctiva and sclera clear  NECK: Supple, No JVD  CHEST/LUNG: Clear to auscultation bilaterally; No wheeze  HEART: Regular rate and rhythm; No murmurs, rubs, or gallops  ABDOMEN: Soft, Nontender, Nondistended; Bowel sounds present  EXTREMITIES:  2+ Peripheral Pulses, No clubbing, cyanosis, or edema  PSYCH: AAOx3  NEUROLOGY: non-focal  SKIN: No rashes or lesions    Labs Reviewed  Spoke to patient in regards to abnormal labs.    CBC Full  -  ( 24 Aug 2018 05:36 )  WBC Count : 7.32 K/uL  Hemoglobin : 13.7 g/dL  Hematocrit : 43.2 %  Platelet Count - Automated : 207 K/uL  Mean Cell Volume : 91.5 fL  Mean Cell Hemoglobin : 29.0 pg  Mean Cell Hemoglobin Concentration : 31.7 g/dL  Auto Neutrophil # : 4.61 K/uL  Auto Lymphocyte # : 1.98 K/uL  Auto Monocyte # : 0.50 K/uL  Auto Eosinophil # : 0.17 K/uL  Auto Basophil # : 0.04 K/uL  Auto Neutrophil % : 63.1 %  Auto Lymphocyte % : 27.0 %  Auto Monocyte % : 6.8 %  Auto Eosinophil % : 2.3 %  Auto Basophil % : 0.5 %    BMP:    08-24 @ 05:36    Blood Urea Nitrogen - 32  Calcium - 9.6  Carbond Dioxide - 23  Chloride - 105  Creatinine - 1.7  Glucose - 83  Potassium - 4.1  Sodium - 144    MEDICATIONS  (STANDING):  amLODIPine   Tablet 5 milliGRAM(s) Oral daily  clopidogrel Tablet 75 milliGRAM(s) Oral daily  docusate sodium 100 milliGRAM(s) Oral three times a day  gabapentin 300 milliGRAM(s) Oral four times a day  heparin  Injectable 5000 Unit(s) SubCutaneous every 8 hours  losartan 50 milliGRAM(s) Oral daily  mirtazapine 7.5 milliGRAM(s) Oral at bedtime  predniSONE   Tablet 5 milliGRAM(s) Oral daily  senna 1 Tablet(s) Oral at bedtime  traMADol 25 milliGRAM(s) Oral two times a day    MEDICATIONS  (PRN):  traMADol 25 milliGRAM(s) Oral two times a day PRN Severe Pain (7 - 10)

## 2018-08-26 NOTE — PHYSICAL THERAPY INITIAL EVALUATION ADULT - DISCHARGE DISPOSITION, PT EVAL
[facility]  Hospitalist Progress Note  Moise Russo MD  11/29/2017    Assessment & Plan   ASSESSMENT/PLAN:  Mr. Cayden Dewitt is an 82-year-old male with a past medical history significant for essential hypertension, diabetes mellitus type 2 and mild intermittent asthma who presents with worsening chest pain over the past 24 hours.  The patient is being admitted for further evaluation and management.        1.  NSTEMI.    The patient reports worsening chest pain over the past 24 hours in association with development of upper respiratory illness.          -  Heparin infusion was initiated in the Emergency Department for ACS.  We will continue heparin infusion.  Pharmacy to dose.          -  troponin peaked to 8.2       - Cardiology consult was ordered, appreciate recommendations       - Resume the patient's zscht-zw-uutvvnfuo ARB, losartan.        - started on a statin, Lipitor 40 mg daily.        -The patient was started on contrast dye allergy pre-medication in the setting of possible angiogram.        - Fasting lipid panel , HDL 43, LDL 95,        - Echo shows EF 40-45% with wall motion abnormalities.       - plan for [facility] today     2.  Essential hypertension.        -  Resume prior to-admission amlodipine, clonidine and losartan with hold parameters.        -  Hold jioyq-gl-fvaqxqdrx hydrochlorothiazide in setting of possible angiogram and contrast dye load (renal protection).        - PRN hydralazine ordered with hold parameters.     3.  Diabetes mellitus type 2, controlled.        - Hold eyopj-dr-upkjtupzs metformin.        - mod cho diet       -  Hemoglobin A1c is 6.0.      4.  Mild intermittent asthma, stable.     - The patient reports use of an albuterol inhaler in the distant past.        5.  Deep venous thrombosis prophylaxis.     - Heparin infusion.       CODE STATUS:  Full code.       DISPOSITION:   - in 1-2 days    Interval History   - chart reviewed  - appreciate  "cardiology consult  - s/p [facility]  - feeling much better, denies any chest pain or sob.    -Data reviewed today: I reviewed all new labs and imaging over the last 24 hours. I personally reviewed no images or EKG's today.    Physical Exam   Heart Rate: 84, Blood pressure (!) 122/93, temperature 97.9  F (36.6  C), temperature source Oral, resp. rate 20, height 1.753 m (5' 9\"), weight 93.6 kg (206 lb 6.4 oz), SpO2 98 %.  Vitals:    11/28/17 1100 11/29/17 0435   Weight: 88.5 kg (195 lb) 93.6 kg (206 lb 6.4 oz)     Vital Signs with Ranges  Temp:  [97.6  F (36.4  C)-97.9  F (36.6  C)] 97.9  F (36.6  C)  Heart Rate:  [76-95] 84  Resp:  [20] 20  BP: (104-131)/(62-93) 122/93  SpO2:  [95 %-98 %] 98 %  I/O's Last 24 hours  I/O last 3 completed shifts:  In: 1037.5 [P.O.:360; I.V.:677.5]  Out: 600 [Urine:600]    Constitutional: Awake, alert, cooperative, no apparent distress  Respiratory: Clear to auscultation bilaterally, no crackles or wheezing  Cardiovascular: Regular rate and rhythm, normal S1 and S2, and no murmur noted  GI: Normal bowel sounds, soft, non-distended, non-tender  Skin/Integumen: No rashes, no cyanosis, no edema  Other:      Medications   All medications were reviewed.    NaCl 75 mL/hr at 11/29/17 1042     adenosine (ADENOSCAN) 90mg in sodium chloride 0.9% 90 mL - for Cath LAB (FFR)       bivalirudin (ANGIOMAX) infusion ADULT       DOBUTamine       DOPamine       nitroGLYcerin       nitroPRUsside (NIPRIDE) IV infusion ADULT/PEDS GREATER than or EQUAL to 45 kg std conc       phenylephrine IV infusion ADULT       NaCl Stopped (11/29/17 0650)     - MEDICATION INSTRUCTIONS -       Continuing ACE inhibitor/ARB/ARNI from home medication list OR ACE inhibitor/ARB order already placed during this visit       Reason beta blocker order not selected       HEParin 950 Units/hr (11/29/17 1253)       sodium chloride (PF)  3 mL Intracatheter Q8H     aspirin EC  325 mg Oral Daily     acetylcholine (MIOCHOL-E) 2000 mcg in  " mL  20 mcg INTRACORONARY Once    Followed by     acetylcholine (MIOCHOL-E) 2000 mcg in  mL  50 mcg INTRACORONARY Once    Followed by     acetylcholine (MIOCHOL-E) 2000 mcg in  mL  50 mcg INTRACORONARY Once     acetylcholine (MIOCHOL-E) 2000 mcg in  mL  20 mcg INTRACORONARY Once    Followed by     acetylcholine (MIOCHOL-E) 2000 mcg in  mL  50 mcg INTRACORONARY Once     iopamidol  300 mL INTRA-ARTERIAL Once     nitroGLYcerin   Transdermal Once     sodium chloride (PF)  3 mL Intracatheter Q8H     diphenhydrAMINE  50 mg Oral Once     amLODIPine (NORVASC) tablet 10 mg  10 mg Oral Daily     cloNIDine (CATAPRES) tablet 0.1 mg  0.1 mg Oral BID     losartan (COZAAR) tablet 50 mg  50 mg Oral Daily     atorvastatin  40 mg Oral Daily     nebivolol  5 mg Oral Daily     predniSONE  20 mg Oral BID w/meals     insulin aspart  1-6 Units Subcutaneous Q4H     insulin aspart  1-5 Units Subcutaneous At Bedtime        Data     Recent Labs  Lab 11/29/17  0600 11/28/17  2355 11/28/17  1920  11/28/17  1131   WBC 10.5  --   --   --  10.0   HGB 14.2  --   --   --  15.1   MCV 87  --   --   --  87     --   --   --  209     --   --   --  138   POTASSIUM 4.0  --   --   --  3.5   CHLORIDE 106  --   --   --  101   CO2 26  --   --   --  27   BUN 16  --   --   --  17   CR 0.72  --   --   --  0.76   ANIONGAP 8  --   --   --  10   MACIEJ 8.4*  --   --   --  9.3   *  --   --   --  242*   ALBUMIN  --   --   --   --  3.8   PROTTOTAL  --   --   --   --  7.1   BILITOTAL  --   --   --   --  0.5   ALKPHOS  --   --   --   --  58   ALT  --   --   --   --  31   AST  --   --   --   --  21   TROPI 7.374* 8.262* 7.103*  < > 0.332*   < > = values in this interval not displayed.    No results found for this or any previous visit (from the past 24 hour(s)).    Moise Russo MD  Text Page  (7am to 6pm)        rehabilitation facility

## 2018-08-26 NOTE — PROGRESS NOTE ADULT - ASSESSMENT
82 F w/ h/o HTN, TIA in 04/18, h/o AAA repair, polymyalgia rheumatica on prednisone taper (currently 5mg daily), active smoker 1 PPD presented from home w/ worsening R groin and thigh pain for 1 week.    # Right hip pain   - possibly due to avascular necrosis vs fracture   - Pain control  - f/u MRI of the right hip (done, pending read)   - Pt-rehab eval    # HTN:  - c/w losartan    # TIA:  - c/w Plavix    # CKD stage 4  - Cr at baseline    # Active Smoker     # DVT, GI PPX

## 2018-08-26 NOTE — PROGRESS NOTE ADULT - ASSESSMENT
82 F w/ h/o HTN, TIA in 04/18, h/o AAA repair,  polymyalgia rheumatica on 5mg q24h prednisone, active smoker 1 PPD presented from home w/ worsening R groin and thigh pain for 1 week.    # Right hip pain   - PMR flare, occult fx vs AVN of femoral head in context of steroid use  - X-ray hip and knee negative  - MRI performed - pending read  - Ortho following  - Pain control  - Physiatry evaluation - STR vs home with VNS    # HTN:  - c/w losaratan    # TIA:  - c/w plavix    # CKD stage 4  - Cr at baseline    # DVT ppx - Heparin SC    Dispo: Resides alone at home - STR/SNF vs. home with VNS

## 2018-08-26 NOTE — PROGRESS NOTE ADULT - SUBJECTIVE AND OBJECTIVE BOX
SUBJECTIVE:    Patient is a 82y old Female who presents with a chief complaint of Right hip pain (23 Aug 2018 20:44)    Currently admitted to medicine with the primary diagnosis of Right hip pain     Today is hospital day 3d. This morning she is resting comfortably in bed and reports no new issues or overnight events.     PAST MEDICAL & SURGICAL HISTORY  Smoker  Polymyalgia rheumatica  Abdominal aortic aneurysm (AAA) without rupture  Essential hypertension  Falls  TIA (transient ischemic attack)  H/O abdominal aortic aneurysm repair    SOCIAL HISTORY:  Negative for smoking/alcohol/drug use.     ALLERGIES:  Fosamax (Other (Moderate))  Novacet (Rash)  penicillin (Rash)  Sulfac 10% (Rash)  Tylenol (Pruritus)    MEDICATIONS:  STANDING MEDICATIONS  amLODIPine   Tablet 5 milliGRAM(s) Oral daily  clopidogrel Tablet 75 milliGRAM(s) Oral daily  docusate sodium 100 milliGRAM(s) Oral three times a day  gabapentin 300 milliGRAM(s) Oral four times a day  heparin  Injectable 5000 Unit(s) SubCutaneous every 8 hours  losartan 50 milliGRAM(s) Oral daily  mirtazapine 7.5 milliGRAM(s) Oral at bedtime  predniSONE   Tablet 5 milliGRAM(s) Oral daily  senna 1 Tablet(s) Oral at bedtime  traMADol 25 milliGRAM(s) Oral two times a day    PRN MEDICATIONS  traMADol 25 milliGRAM(s) Oral two times a day PRN    VITALS:   T(F): 98  HR: 50  BP: 144/84  RR: 18  SpO2: --    LABS:      RADIOLOGY:    < from: Xray Femur 2 Views, Right (08.23.18 @ 18:36) >  Impression:    No radiographic evidence of acute acute osseous abnormality or   osteonecrosis of right hip.    < end of copied text >    PHYSICAL EXAM:  GEN: No acute distress  LUNGS: Clear to auscultation bilaterally   HEART: Regular  ABD: Soft, non-tender, non-distended.  EXT: NC/NC/NE/2+PP/FELICIANO/Skin Intact,  RLE active ROM limited by pain, passive ROM not limited  NEURO: AAOX3    Intravenous access: PIV

## 2018-08-26 NOTE — PHYSICAL THERAPY INITIAL EVALUATION ADULT - TRANSFER SAFETY CONCERNS NOTED: SIT/STAND, REHAB EVAL
losing balance/inability to maintain weight-bearing restrictions w/o assist/decreased weight-shifting ability

## 2018-08-26 NOTE — PHYSICAL THERAPY INITIAL EVALUATION ADULT - TRANSFER SAFETY CONCERNS NOTED: BED/CHAIR, REHAB EVAL
decreased weight-shifting ability/stand pivot/decreased balance during turns/inability to maintain weight-bearing restrictions w/o assist

## 2018-08-26 NOTE — PHYSICAL THERAPY INITIAL EVALUATION ADULT - IMPAIRMENTS FOUND, PT EVAL
joint integrity and mobility/posture/aerobic capacity/endurance/ROM/gait, locomotion, and balance/poor safety awareness

## 2018-08-26 NOTE — PHYSICAL THERAPY INITIAL EVALUATION ADULT - PLANNED THERAPY INTERVENTIONS, PT EVAL
neuromuscular re-education/stretching/strengthening/transfer training/balance training/bed mobility training/gait training

## 2018-08-27 RX ORDER — AMLODIPINE BESYLATE 2.5 MG/1
5 TABLET ORAL ONCE
Qty: 0 | Refills: 0 | Status: COMPLETED | OUTPATIENT
Start: 2018-08-27 | End: 2018-08-27

## 2018-08-27 RX ORDER — ONDANSETRON 8 MG/1
4 TABLET, FILM COATED ORAL ONCE
Qty: 0 | Refills: 0 | Status: COMPLETED | OUTPATIENT
Start: 2018-08-27 | End: 2018-08-27

## 2018-08-27 RX ORDER — AMLODIPINE BESYLATE 2.5 MG/1
10 TABLET ORAL DAILY
Qty: 0 | Refills: 0 | Status: DISCONTINUED | OUTPATIENT
Start: 2018-08-28 | End: 2018-08-29

## 2018-08-27 RX ADMIN — TRAMADOL HYDROCHLORIDE 25 MILLIGRAM(S): 50 TABLET ORAL at 05:07

## 2018-08-27 RX ADMIN — ONDANSETRON 4 MILLIGRAM(S): 8 TABLET, FILM COATED ORAL at 16:32

## 2018-08-27 RX ADMIN — GABAPENTIN 300 MILLIGRAM(S): 400 CAPSULE ORAL at 23:34

## 2018-08-27 RX ADMIN — GABAPENTIN 300 MILLIGRAM(S): 400 CAPSULE ORAL at 13:33

## 2018-08-27 RX ADMIN — Medication 100 MILLIGRAM(S): at 13:33

## 2018-08-27 RX ADMIN — CLOPIDOGREL BISULFATE 75 MILLIGRAM(S): 75 TABLET, FILM COATED ORAL at 13:34

## 2018-08-27 RX ADMIN — AMLODIPINE BESYLATE 5 MILLIGRAM(S): 2.5 TABLET ORAL at 11:08

## 2018-08-27 RX ADMIN — Medication 5 MILLIGRAM(S): at 05:06

## 2018-08-27 RX ADMIN — HEPARIN SODIUM 5000 UNIT(S): 5000 INJECTION INTRAVENOUS; SUBCUTANEOUS at 05:06

## 2018-08-27 RX ADMIN — GABAPENTIN 300 MILLIGRAM(S): 400 CAPSULE ORAL at 05:05

## 2018-08-27 RX ADMIN — LOSARTAN POTASSIUM 50 MILLIGRAM(S): 100 TABLET, FILM COATED ORAL at 05:05

## 2018-08-27 RX ADMIN — GABAPENTIN 300 MILLIGRAM(S): 400 CAPSULE ORAL at 18:16

## 2018-08-27 RX ADMIN — MIRTAZAPINE 7.5 MILLIGRAM(S): 45 TABLET, ORALLY DISINTEGRATING ORAL at 22:19

## 2018-08-27 RX ADMIN — AMLODIPINE BESYLATE 5 MILLIGRAM(S): 2.5 TABLET ORAL at 05:05

## 2018-08-27 RX ADMIN — HEPARIN SODIUM 5000 UNIT(S): 5000 INJECTION INTRAVENOUS; SUBCUTANEOUS at 13:33

## 2018-08-27 NOTE — PROGRESS NOTE ADULT - ASSESSMENT
82 F w/ h/o HTN, TIA in 04/18, h/o AAA repair,  polymyalgia rheumatica on 5mg q24h prednisone, active smoker 1 PPD presented from home w/ worsening R groin and thigh pain for 1 week.    # Right hip pain. Occult fx vs AVN of femoral head in context of steroid use  -- Imaging  - X-ray hip and knee negative  - MRI incomplete, demonstrating findings highly suspicious for nondisplaced fracture of the right inferior subcapital femoral neck  - CT R Hip noncon inconclusive  -- Plan  - f/u MR R Hip to be completed in evening  - Ortho following  - Pain control  - Physiatry evaluation - STR vs home with VNS    # HTN:  - c/w losaratan    # TIA:  - c/w plavix    # CKD stage 4  - Cr at baseline    # DVT ppx - Heparin SC    Dispo: Resides alone at home - STR/SNF vs. home with VNS

## 2018-08-27 NOTE — PROGRESS NOTE ADULT - SUBJECTIVE AND OBJECTIVE BOX
CHIEF COMPLAINT:  Patient is a 82y old Female who presents with a chief complaint of Right hip pain (23 Aug 2018 20:44)    Currently admitted to medicine with the primary diagnosis of Right hip pain     Today is hospital day 4d. This morning she is resting comfortably in bed and reports no new issues or overnight events.     PAST MEDICAL & SURGICAL HISTORY  Smoker  Polymyalgia rheumatica  Abdominal aortic aneurysm (AAA) without rupture  Essential hypertension  Falls  TIA (transient ischemic attack)  H/O abdominal aortic aneurysm repair    SOCIAL HISTORY:  Negative for smoking/alcohol/drug use.     ALLERGIES:  Fosamax (Other (Moderate))  Novacet (Rash)  penicillin (Rash)  Sulfac 10% (Rash)  Tylenol (Pruritus)    MEDICATIONS:  STANDING MEDICATIONS  clopidogrel Tablet 75 milliGRAM(s) Oral daily  docusate sodium 100 milliGRAM(s) Oral three times a day  gabapentin 300 milliGRAM(s) Oral four times a day  heparin  Injectable 5000 Unit(s) SubCutaneous every 8 hours  losartan 50 milliGRAM(s) Oral daily  mirtazapine 7.5 milliGRAM(s) Oral at bedtime  predniSONE   Tablet 5 milliGRAM(s) Oral daily  senna 1 Tablet(s) Oral at bedtime  traMADol 25 milliGRAM(s) Oral two times a day    PRN MEDICATIONS  traMADol 25 milliGRAM(s) Oral two times a day PRN    VITALS:   T(F): 97.2  HR: 66  BP: 164/87  RR: 18  SpO2: --    LABS:    RADIOLOGY:  < from: Xray Femur 2 Views, Right (08.23.18 @ 18:36) >  No radiographic evidence of acute acute osseous abnormality or osteonecrosis of right hip.  < end of copied text >    < from: Xray Hip w/ Pelvis 3-4 Views, Bilateral (08.23.18 @ 18:36) >  Osteopenia, without radiographic evidence of acute fracture or osteonecrosis of bilateral hips.  < end of copied text >    < from: MR Hip No Cont, Right (08.25.18 @ 20:49) >  Incomplete MRI of the right hip demonstrating findings highly suspicious for nondisplaced fracture of the right inferior subcapital femoral neck. Consider a noncontrast CT of the right hip for further evaluation.  < end of copied text >    < from: CT Hip No Cont, Right (08.27.18 @ 10:12) >  Diffuse osteopenia.    No definite acute displaced fracture or dislocation.    Cortical thickening and adjacent trabecular hyperdensity, corresponding to the MRI findings of signal abnormality at the inferior aspect of the right femoral neck, consistent with chronic stress reaction. However, acute on chronic stress reaction/nondisplaced trabecular stress fracture cannot be excluded.  < end of copied text >    PHYSICAL EXAM:  GEN: No acute distress  LUNGS: Clear to auscultation bilaterally   HEART: Regular  ABD: Soft, non-tender, non-distended.  EXT: NC/NC/NE/2+PP/FELICIANO/Skin Intact,  RLE active ROM limited by pain, passive ROM not limited  NEURO: AAOX3

## 2018-08-28 LAB
APTT BLD: 34.2 SEC — SIGNIFICANT CHANGE UP (ref 27–39.2)
HCT VFR BLD CALC: 40.9 % — SIGNIFICANT CHANGE UP (ref 37–47)
HGB BLD-MCNC: 13.1 G/DL — SIGNIFICANT CHANGE UP (ref 12–16)
INR BLD: 1.01 RATIO — SIGNIFICANT CHANGE UP (ref 0.65–1.3)
MCHC RBC-ENTMCNC: 29.4 PG — SIGNIFICANT CHANGE UP (ref 27–31)
MCHC RBC-ENTMCNC: 32 G/DL — SIGNIFICANT CHANGE UP (ref 32–37)
MCV RBC AUTO: 91.9 FL — SIGNIFICANT CHANGE UP (ref 81–99)
NRBC # BLD: 0 /100 WBCS — SIGNIFICANT CHANGE UP (ref 0–0)
PLATELET # BLD AUTO: 209 K/UL — SIGNIFICANT CHANGE UP (ref 130–400)
PROTHROM AB SERPL-ACNC: 10.9 SEC — SIGNIFICANT CHANGE UP (ref 9.95–12.87)
RBC # BLD: 4.45 M/UL — SIGNIFICANT CHANGE UP (ref 4.2–5.4)
RBC # FLD: 13.6 % — SIGNIFICANT CHANGE UP (ref 11.5–14.5)
WBC # BLD: 7.64 K/UL — SIGNIFICANT CHANGE UP (ref 4.8–10.8)
WBC # FLD AUTO: 7.64 K/UL — SIGNIFICANT CHANGE UP (ref 4.8–10.8)

## 2018-08-28 RX ADMIN — HEPARIN SODIUM 5000 UNIT(S): 5000 INJECTION INTRAVENOUS; SUBCUTANEOUS at 05:15

## 2018-08-28 RX ADMIN — GABAPENTIN 300 MILLIGRAM(S): 400 CAPSULE ORAL at 12:18

## 2018-08-28 RX ADMIN — HEPARIN SODIUM 5000 UNIT(S): 5000 INJECTION INTRAVENOUS; SUBCUTANEOUS at 13:11

## 2018-08-28 RX ADMIN — GABAPENTIN 300 MILLIGRAM(S): 400 CAPSULE ORAL at 17:39

## 2018-08-28 RX ADMIN — SENNA PLUS 1 TABLET(S): 8.6 TABLET ORAL at 21:35

## 2018-08-28 RX ADMIN — CLOPIDOGREL BISULFATE 75 MILLIGRAM(S): 75 TABLET, FILM COATED ORAL at 12:18

## 2018-08-28 RX ADMIN — LOSARTAN POTASSIUM 50 MILLIGRAM(S): 100 TABLET, FILM COATED ORAL at 05:15

## 2018-08-28 RX ADMIN — MIRTAZAPINE 7.5 MILLIGRAM(S): 45 TABLET, ORALLY DISINTEGRATING ORAL at 21:35

## 2018-08-28 RX ADMIN — HEPARIN SODIUM 5000 UNIT(S): 5000 INJECTION INTRAVENOUS; SUBCUTANEOUS at 21:34

## 2018-08-28 RX ADMIN — Medication 5 MILLIGRAM(S): at 05:15

## 2018-08-28 RX ADMIN — Medication 100 MILLIGRAM(S): at 21:35

## 2018-08-28 RX ADMIN — GABAPENTIN 300 MILLIGRAM(S): 400 CAPSULE ORAL at 23:40

## 2018-08-28 RX ADMIN — GABAPENTIN 300 MILLIGRAM(S): 400 CAPSULE ORAL at 05:15

## 2018-08-28 RX ADMIN — Medication 100 MILLIGRAM(S): at 13:11

## 2018-08-28 RX ADMIN — AMLODIPINE BESYLATE 10 MILLIGRAM(S): 2.5 TABLET ORAL at 05:15

## 2018-08-28 NOTE — PRE-ANESTHESIA EVALUATION ADULT - NSANTHOSAYNRD_GEN_A_CORE
No. SPEEDY screening performed.  STOP BANG Legend: 0-2 = LOW Risk; 3-4 = INTERMEDIATE Risk; 5-8 = HIGH Risk

## 2018-08-28 NOTE — CHART NOTE - NSCHARTNOTEFT_GEN_A_CORE
At 5:40pm was called by pre-op nurse anesthetist who informed me that patient will need medical clearance prior to OR tomorrow. Patient is schedule for OR tomorrow afternoon (around 4pm). Please evaluate for medical clearance prior in the morning, prior to going to the OR.

## 2018-08-28 NOTE — PROGRESS NOTE ADULT - SUBJECTIVE AND OBJECTIVE BOX
ORTHO PROGRESS NOTE     Patient seen and examined at bedside. Patient had repeat MRI last night. Pain has been continuing with ambulation. Denies any other symptoms    MEDICATIONS  (STANDING):  amLODIPine   Tablet 10 milliGRAM(s) Oral daily  clopidogrel Tablet 75 milliGRAM(s) Oral daily  docusate sodium 100 milliGRAM(s) Oral three times a day  gabapentin 300 milliGRAM(s) Oral four times a day  heparin  Injectable 5000 Unit(s) SubCutaneous every 8 hours  losartan 50 milliGRAM(s) Oral daily  mirtazapine 7.5 milliGRAM(s) Oral at bedtime  predniSONE   Tablet 5 milliGRAM(s) Oral daily  senna 1 Tablet(s) Oral at bedtime  traMADol 25 milliGRAM(s) Oral two times a day    MEDICATIONS  (PRN):  traMADol 25 milliGRAM(s) Oral two times a day PRN Severe Pain (7 - 10)      Vital Signs Last 24 Hrs  T(C): 36.2 (28 Aug 2018 01:10), Max: 36.2 (28 Aug 2018 01:10)  T(F): 97.1 (28 Aug 2018 01:10), Max: 97.1 (28 Aug 2018 01:10)  HR: 71 (28 Aug 2018 05:06) (62 - 71)  BP: 166/86 (28 Aug 2018 05:06) (131/70 - 166/86)  BP(mean): --  RR: 18 (28 Aug 2018 05:06) (18 - 20)  SpO2: --    PE:   Compartments soft and compressible  Motor intact distally  No pain with log roll or axial compression.   TTP groin  SILT distally  CR<2sec  palpable pulses       A/P: 82yFemale with R hip pain.  FU MRI results  OOB to Chair   NWB RLE   PT/OT  Pain control   Ext icing/elevation  Incentive Spirometry   DVT Prophylaxis

## 2018-08-28 NOTE — PROGRESS NOTE ADULT - ASSESSMENT
82 F w/ h/o HTN, TIA in 04/18, h/o AAA repair,  polymyalgia rheumatica on 5mg q24h prednisone, active smoker 1 PPD presented from home w/ worsening R groin and thigh pain for 1 week.    # Right hip pain. Occult fx vs AVN of femoral head in context of steroid use  -- Imaging  - X-ray hip and knee negative  - MRI w/ acute nondisplaced right inferior femoral neck fracture, and right superior acetabular stress fracture  - CT R Hip noncon inconclusive  -- Plan  - Ortho following, awaiting recs  - Pain control  - Physiatry evaluation - STR vs home with VNS    # HTN:  - c/w losaratan    # TIA:  - c/w plavix    # CKD stage 4  - Cr at baseline    # DVT ppx - Heparin SC    Dispo: Resides alone at home - STR/SNF vs. home with VNS

## 2018-08-28 NOTE — PROGRESS NOTE ADULT - SUBJECTIVE AND OBJECTIVE BOX
CHIEF COMPLAINT:  Patient is a 82y old Female who presents with a chief complaint of Right hip pain (23 Aug 2018 20:44)    Currently admitted to medicine with the primary diagnosis of Right hip pain     Today is hospital day 5d. This morning she is resting comfortably in bed and reports no new issues or overnight events.     PAST MEDICAL & SURGICAL HISTORY  Smoker  Polymyalgia rheumatica  Abdominal aortic aneurysm (AAA) without rupture  Essential hypertension  Falls  TIA (transient ischemic attack)  H/O abdominal aortic aneurysm repair    SOCIAL HISTORY:  Negative for smoking/alcohol/drug use.     ALLERGIES:  Fosamax (Other (Moderate))  Novacet (Rash)  penicillin (Rash)  Sulfac 10% (Rash)  Tylenol (Pruritus)    MEDICATIONS:  STANDING MEDICATIONS  amLODIPine   Tablet 10 milliGRAM(s) Oral daily  clopidogrel Tablet 75 milliGRAM(s) Oral daily  docusate sodium 100 milliGRAM(s) Oral three times a day  gabapentin 300 milliGRAM(s) Oral four times a day  heparin  Injectable 5000 Unit(s) SubCutaneous every 8 hours  losartan 50 milliGRAM(s) Oral daily  mirtazapine 7.5 milliGRAM(s) Oral at bedtime  predniSONE   Tablet 5 milliGRAM(s) Oral daily  senna 1 Tablet(s) Oral at bedtime  traMADol 25 milliGRAM(s) Oral two times a day    PRN MEDICATIONS  traMADol 25 milliGRAM(s) Oral two times a day PRN    VITALS:   T(F): 96.7  HR: 65  BP: 150/75  RR: 18  SpO2: --    LABS:    RADIOLOGY:  < from: Xray Femur 2 Views, Right (08.23.18 @ 18:36) >  No radiographic evidence of acute acute osseous abnormality or osteonecrosis of right hip.  < end of copied text >    < from: Xray Hip w/ Pelvis 3-4 Views, Bilateral (08.23.18 @ 18:36) >  Osteopenia, without radiographic evidence of acute fracture or osteonecrosis of bilateral hips.  < end of copied text >    < from: MR Hip No Cont, Right (08.25.18 @ 20:49) >  Incomplete MRI of the right hip demonstrating findings highly suspicious for nondisplaced fracture of the right inferior subcapital femoral neck. Consider a noncontrast CT of the right hip for further evaluation.  < end of copied text >    < from: CT Hip No Cont, Right (08.27.18 @ 10:12) >  Diffuse osteopenia.    No definite acute displaced fracture or dislocation.    Cortical thickening and adjacent trabecular hyperdensity, corresponding to the MRI findings of signal abnormality at the inferior aspect of the right femoral neck, consistent with chronic stress reaction. However, acute on chronic stress reaction/nondisplaced trabecular stress fracture cannot be excluded.  < end of copied text >    < from: MR Hip No Cont, Right (08.27.18 @ 17:29) >  Acute nondisplaced right inferior femoral neck fracture, and right superior acetabular stress fracture.    Right proximal thigh muscular strain.  < end of copied text >    PHYSICAL EXAM:  GEN: No acute distress  LUNGS: Clear to auscultation bilaterally   HEART: Regular  ABD: Soft, non-tender, non-distended.  EXT: NC/NC/NE/2+PP/FELICIANO/Skin Intact,  RLE active ROM limited by pain, passive ROM not limited  NEURO: AAOX3 CHIEF COMPLAINT:  Patient is a 82y old Female who presents with a chief complaint of Right hip pain (23 Aug 2018 20:44)    Currently admitted to medicine with the primary diagnosis of Right hip pain     Today is hospital day 5d. This morning she is resting comfortably in bed and reports no new issues or overnight events.     PAST MEDICAL & SURGICAL HISTORY  Smoker  Polymyalgia rheumatica  Abdominal aortic aneurysm (AAA) without rupture  Essential hypertension  Falls  TIA (transient ischemic attack)  H/O abdominal aortic aneurysm repair    SOCIAL HISTORY:  Negative for smoking/alcohol/drug use.     ALLERGIES:  Fosamax (Other (Moderate))  Novacet (Rash)  penicillin (Rash)  Sulfac 10% (Rash)  Tylenol (Pruritus)    MEDICATIONS:  STANDING MEDICATIONS  amLODIPine   Tablet 10 milliGRAM(s) Oral daily  clopidogrel Tablet 75 milliGRAM(s) Oral daily  docusate sodium 100 milliGRAM(s) Oral three times a day  gabapentin 300 milliGRAM(s) Oral four times a day  heparin  Injectable 5000 Unit(s) SubCutaneous every 8 hours  losartan 50 milliGRAM(s) Oral daily  mirtazapine 7.5 milliGRAM(s) Oral at bedtime  predniSONE   Tablet 5 milliGRAM(s) Oral daily  senna 1 Tablet(s) Oral at bedtime  traMADol 25 milliGRAM(s) Oral two times a day    PRN MEDICATIONS  traMADol 25 milliGRAM(s) Oral two times a day PRN    VITALS:   T(F): 96.7  HR: 65  BP: 150/75  RR: 18  SpO2: --    LABS:    RADIOLOGY:  < from: Xray Femur 2 Views, Right (08.23.18 @ 18:36) >  No radiographic evidence of acute acute osseous abnormality or osteonecrosis of right hip.  < end of copied text >    < from: Xray Hip w/ Pelvis 3-4 Views, Bilateral (08.23.18 @ 18:36) >  Osteopenia, without radiographic evidence of acute fracture or osteonecrosis of bilateral hips.  < end of copied text >    < from: MR Hip No Cont, Right (08.25.18 @ 20:49) >  Incomplete MRI of the right hip demonstrating findings highly suspicious for nondisplaced fracture of the right inferior subcapital femoral neck. Consider a noncontrast CT of the right hip for further evaluation.  < end of copied text >    < from: CT Hip No Cont, Right (08.27.18 @ 10:12) >  Diffuse osteopenia.    No definite acute displaced fracture or dislocation.    Cortical thickening and adjacent trabecular hyperdensity, corresponding to the MRI findings of signal abnormality at the inferior aspect of the right femoral neck, consistent with chronic stress reaction. However, acute on chronic stress reaction/nondisplaced trabecular stress fracture cannot be excluded.  < end of copied text >    < from: MR Hip No Cont, Right (08.27.18 @ 17:29) >  Acute nondisplaced right inferior femoral neck fracture, and right superior acetabular stress fracture.    Right proximal thigh muscular strain.  < end of copied text >    PHYSICAL EXAM:  GEN: No acute distress  LUNGS/ pulmonary : Clear to auscultation bilaterally   HEART / CVS : Regular  ABD: Soft, non-tender, non-distended.  EXT: NC/NC/NE/2+PP/FELICIANO/Skin Intact,  RLE active ROM limited by pain, passive ROM not limited  NEURO: AAOX3

## 2018-08-28 NOTE — PRE-ANESTHESIA EVALUATION ADULT - NSANTHPMHFT_GEN_ALL_CORE
83 yo F with PMHx of HTN, TIA (04/18), AAA repair, polymyalgia rheumatica on 5 mg q24hr prednisone, active smoker 1 PPD presented from home with worsening Right groin and thigh pain x 1 week.

## 2018-08-29 LAB
ANION GAP SERPL CALC-SCNC: 15 MMOL/L — HIGH (ref 7–14)
BLD GP AB SCN SERPL QL: SIGNIFICANT CHANGE UP
BUN SERPL-MCNC: 42 MG/DL — HIGH (ref 10–20)
CALCIUM SERPL-MCNC: 9.5 MG/DL — SIGNIFICANT CHANGE UP (ref 8.5–10.1)
CHLORIDE SERPL-SCNC: 103 MMOL/L — SIGNIFICANT CHANGE UP (ref 98–110)
CO2 SERPL-SCNC: 22 MMOL/L — SIGNIFICANT CHANGE UP (ref 17–32)
CREAT SERPL-MCNC: 1.7 MG/DL — HIGH (ref 0.7–1.5)
GLUCOSE SERPL-MCNC: 97 MG/DL — SIGNIFICANT CHANGE UP (ref 70–99)
POTASSIUM SERPL-MCNC: 4.9 MMOL/L — SIGNIFICANT CHANGE UP (ref 3.5–5)
POTASSIUM SERPL-SCNC: 4.9 MMOL/L — SIGNIFICANT CHANGE UP (ref 3.5–5)
SODIUM SERPL-SCNC: 140 MMOL/L — SIGNIFICANT CHANGE UP (ref 135–146)
TYPE + AB SCN PNL BLD: SIGNIFICANT CHANGE UP

## 2018-08-29 RX ORDER — CLOPIDOGREL BISULFATE 75 MG/1
75 TABLET, FILM COATED ORAL DAILY
Qty: 0 | Refills: 0 | Status: DISCONTINUED | OUTPATIENT
Start: 2018-08-29 | End: 2018-08-31

## 2018-08-29 RX ORDER — MORPHINE SULFATE 50 MG/1
2 CAPSULE, EXTENDED RELEASE ORAL EVERY 4 HOURS
Qty: 0 | Refills: 0 | Status: DISCONTINUED | OUTPATIENT
Start: 2018-08-29 | End: 2018-08-31

## 2018-08-29 RX ORDER — SODIUM CHLORIDE 9 MG/ML
1000 INJECTION, SOLUTION INTRAVENOUS
Qty: 0 | Refills: 0 | Status: DISCONTINUED | OUTPATIENT
Start: 2018-08-29 | End: 2018-08-29

## 2018-08-29 RX ORDER — SENNA PLUS 8.6 MG/1
1 TABLET ORAL AT BEDTIME
Qty: 0 | Refills: 0 | Status: DISCONTINUED | OUTPATIENT
Start: 2018-08-29 | End: 2018-08-31

## 2018-08-29 RX ORDER — HEPARIN SODIUM 5000 [USP'U]/ML
5000 INJECTION INTRAVENOUS; SUBCUTANEOUS EVERY 8 HOURS
Qty: 0 | Refills: 0 | Status: DISCONTINUED | OUTPATIENT
Start: 2018-08-29 | End: 2018-08-31

## 2018-08-29 RX ORDER — TRAMADOL HYDROCHLORIDE 50 MG/1
25 TABLET ORAL
Qty: 0 | Refills: 0 | Status: DISCONTINUED | OUTPATIENT
Start: 2018-08-29 | End: 2018-08-31

## 2018-08-29 RX ORDER — SODIUM CHLORIDE 9 MG/ML
1000 INJECTION, SOLUTION INTRAVENOUS
Qty: 0 | Refills: 0 | Status: DISCONTINUED | OUTPATIENT
Start: 2018-08-29 | End: 2018-08-31

## 2018-08-29 RX ORDER — OXYCODONE HYDROCHLORIDE 5 MG/1
5 TABLET ORAL EVERY 4 HOURS
Qty: 0 | Refills: 0 | Status: DISCONTINUED | OUTPATIENT
Start: 2018-08-29 | End: 2018-08-31

## 2018-08-29 RX ORDER — MIRTAZAPINE 45 MG/1
7.5 TABLET, ORALLY DISINTEGRATING ORAL AT BEDTIME
Qty: 0 | Refills: 0 | Status: DISCONTINUED | OUTPATIENT
Start: 2018-08-29 | End: 2018-08-31

## 2018-08-29 RX ORDER — LOSARTAN POTASSIUM 100 MG/1
50 TABLET, FILM COATED ORAL DAILY
Qty: 0 | Refills: 0 | Status: DISCONTINUED | OUTPATIENT
Start: 2018-08-29 | End: 2018-08-31

## 2018-08-29 RX ORDER — AMLODIPINE BESYLATE 2.5 MG/1
10 TABLET ORAL DAILY
Qty: 0 | Refills: 0 | Status: DISCONTINUED | OUTPATIENT
Start: 2018-08-29 | End: 2018-08-31

## 2018-08-29 RX ORDER — DOCUSATE SODIUM 100 MG
100 CAPSULE ORAL THREE TIMES A DAY
Qty: 0 | Refills: 0 | Status: DISCONTINUED | OUTPATIENT
Start: 2018-08-29 | End: 2018-08-31

## 2018-08-29 RX ORDER — OXYCODONE HYDROCHLORIDE 5 MG/1
5 TABLET ORAL ONCE
Qty: 0 | Refills: 0 | Status: DISCONTINUED | OUTPATIENT
Start: 2018-08-29 | End: 2018-08-29

## 2018-08-29 RX ORDER — GABAPENTIN 400 MG/1
300 CAPSULE ORAL
Qty: 0 | Refills: 0 | Status: DISCONTINUED | OUTPATIENT
Start: 2018-08-29 | End: 2018-08-31

## 2018-08-29 RX ORDER — ONDANSETRON 8 MG/1
4 TABLET, FILM COATED ORAL ONCE
Qty: 0 | Refills: 0 | Status: DISCONTINUED | OUTPATIENT
Start: 2018-08-29 | End: 2018-08-29

## 2018-08-29 RX ORDER — HYDROMORPHONE HYDROCHLORIDE 2 MG/ML
0.5 INJECTION INTRAMUSCULAR; INTRAVENOUS; SUBCUTANEOUS
Qty: 0 | Refills: 0 | Status: DISCONTINUED | OUTPATIENT
Start: 2018-08-29 | End: 2018-08-29

## 2018-08-29 RX ORDER — HYDROMORPHONE HYDROCHLORIDE 2 MG/ML
0.5 INJECTION INTRAMUSCULAR; INTRAVENOUS; SUBCUTANEOUS ONCE
Qty: 0 | Refills: 0 | Status: DISCONTINUED | OUTPATIENT
Start: 2018-08-29 | End: 2018-08-29

## 2018-08-29 RX ADMIN — Medication 100 MILLIGRAM(S): at 05:19

## 2018-08-29 RX ADMIN — LOSARTAN POTASSIUM 50 MILLIGRAM(S): 100 TABLET, FILM COATED ORAL at 05:19

## 2018-08-29 RX ADMIN — HYDROMORPHONE HYDROCHLORIDE 0.5 MILLIGRAM(S): 2 INJECTION INTRAMUSCULAR; INTRAVENOUS; SUBCUTANEOUS at 18:43

## 2018-08-29 RX ADMIN — HYDROMORPHONE HYDROCHLORIDE 0.5 MILLIGRAM(S): 2 INJECTION INTRAMUSCULAR; INTRAVENOUS; SUBCUTANEOUS at 18:09

## 2018-08-29 RX ADMIN — GABAPENTIN 300 MILLIGRAM(S): 400 CAPSULE ORAL at 05:19

## 2018-08-29 RX ADMIN — Medication 5 MILLIGRAM(S): at 05:19

## 2018-08-29 RX ADMIN — CLOPIDOGREL BISULFATE 75 MILLIGRAM(S): 75 TABLET, FILM COATED ORAL at 12:56

## 2018-08-29 RX ADMIN — Medication 100 MILLIGRAM(S): at 22:39

## 2018-08-29 RX ADMIN — HEPARIN SODIUM 5000 UNIT(S): 5000 INJECTION INTRAVENOUS; SUBCUTANEOUS at 05:19

## 2018-08-29 RX ADMIN — HEPARIN SODIUM 5000 UNIT(S): 5000 INJECTION INTRAVENOUS; SUBCUTANEOUS at 13:03

## 2018-08-29 RX ADMIN — MIRTAZAPINE 7.5 MILLIGRAM(S): 45 TABLET, ORALLY DISINTEGRATING ORAL at 22:39

## 2018-08-29 RX ADMIN — GABAPENTIN 300 MILLIGRAM(S): 400 CAPSULE ORAL at 12:56

## 2018-08-29 RX ADMIN — HEPARIN SODIUM 5000 UNIT(S): 5000 INJECTION INTRAVENOUS; SUBCUTANEOUS at 22:39

## 2018-08-29 RX ADMIN — HYDROMORPHONE HYDROCHLORIDE 0.5 MILLIGRAM(S): 2 INJECTION INTRAMUSCULAR; INTRAVENOUS; SUBCUTANEOUS at 17:58

## 2018-08-29 RX ADMIN — AMLODIPINE BESYLATE 10 MILLIGRAM(S): 2.5 TABLET ORAL at 05:18

## 2018-08-29 RX ADMIN — HYDROMORPHONE HYDROCHLORIDE 0.5 MILLIGRAM(S): 2 INJECTION INTRAMUSCULAR; INTRAVENOUS; SUBCUTANEOUS at 18:28

## 2018-08-29 RX ADMIN — Medication 100 MILLIGRAM(S): at 13:03

## 2018-08-29 NOTE — PROGRESS NOTE ADULT - SUBJECTIVE AND OBJECTIVE BOX
pt seen and examined  agree with PE and history  MRI right femoral neck fracture  Plan for crpp right hip  r/b/a explained, all questions answered

## 2018-08-29 NOTE — PROGRESS NOTE ADULT - SUBJECTIVE AND OBJECTIVE BOX
ORTHO PROGRESS NOTE       82yFemale POD #  0    S/P CRPP right hip     Patient seen and examined at bedside. The patient is awake and alert in NAD. No complaints of chest pain, SOB, N/V. Pain controlled    Vital Signs Last 24 Hrs  T(C): 35.4 (29 Aug 2018 19:30), Max: 36.9 (29 Aug 2018 17:42)  T(F): 95.7 (29 Aug 2018 19:30), Max: 98.4 (29 Aug 2018 17:42)  HR: 73 (29 Aug 2018 19:30) (61 - 90)  BP: 180/92 (29 Aug 2018 19:30) (153/79 - 197/98)  BP(mean): --  RR: 20 (29 Aug 2018 19:30) (14 - 20)  SpO2: 97% (29 Aug 2018 19:10) (93% - 98%)                          13.1   7.64  )-----------( 209      ( 28 Aug 2018 22:41 )             40.9     08-28    140  |  103  |  42<H>  ----------------------------<  97  4.9   |  22  |  1.7<H>    Ca    9.5      28 Aug 2018 22:41      PT/INR - ( 28 Aug 2018 22:41 )   PT: 10.90 sec;   INR: 1.01 ratio         PTT - ( 28 Aug 2018 22:41 )  PTT:34.2 sec      DVT ppx  HSQ    MEDICATIONS  (STANDING):  amLODIPine   Tablet 10 milliGRAM(s) Oral daily  clindamycin IVPB 600 milliGRAM(s) IV Intermittent every 8 hours  clopidogrel Tablet 75 milliGRAM(s) Oral daily  docusate sodium 100 milliGRAM(s) Oral three times a day  gabapentin 300 milliGRAM(s) Oral four times a day  heparin  Injectable 5000 Unit(s) SubCutaneous every 8 hours  lactated ringers. 1000 milliLiter(s) (75 mL/Hr) IV Continuous <Continuous>  losartan 50 milliGRAM(s) Oral daily  mirtazapine 7.5 milliGRAM(s) Oral at bedtime  predniSONE   Tablet 5 milliGRAM(s) Oral daily  senna 1 Tablet(s) Oral at bedtime  traMADol 25 milliGRAM(s) Oral two times a day    MEDICATIONS  (PRN):  morphine  - Injectable 2 milliGRAM(s) IV Push every 4 hours PRN Severe Pain (7 - 10)  oxyCODONE    IR 5 milliGRAM(s) Oral every 4 hours PRN Moderate Pain (4 - 6)      PE:  Dressing with mild serosang drainage         Compartments soft         NVI, SILT           A/P: 82yFemale POD # 0   S/P Closed reduction percutaneous pinning right hip for nondisplaced femoral neck fracture           post op abx x24 hrs           WBAT RLE           ice hip           OOB to Chair            Physical Therapy           Pain control            Incentive Spirometry            DVT Prophylaxis            Discharge planning

## 2018-08-29 NOTE — PROGRESS NOTE ADULT - ASSESSMENT
82 F w/ h/o HTN, TIA in 04/18, h/o AAA repair,  polymyalgia rheumatica on 5mg q24h prednisone, active smoker 1 PPD presented from home w/ worsening R groin and thigh pain for 1 week.    # Right hip pain. Occult fx vs AVN of femoral head in context of steroid use  -- Imaging  - X-ray hip and knee negative  - MRI w/ acute nondisplaced right inferior femoral neck fracture, and right superior acetabular stress fracture  - CT R Hip noncon inconclusive  -- Plan  - to OR at ~4pm, NPO since midnight  - Ortho following  - Pain control    # HTN:  - c/w losaratan    # TIA:  - c/w plavix    # CKD stage 4  - Cr at baseline    # DVT ppx - Heparin SC    Dispo: Resides alone at home - STR/SNF vs. home with VNS

## 2018-08-29 NOTE — PROGRESS NOTE ADULT - SUBJECTIVE AND OBJECTIVE BOX
CHIEF COMPLAINT:  Patient is a 82y old Female who presents with a chief complaint of Right hip pain (23 Aug 2018 20:44)    Currently admitted to medicine with the primary diagnosis of Right hip pain     Today is hospital day 6d. This morning she is resting comfortably in bed and reports no new issues or overnight events.     PAST MEDICAL & SURGICAL HISTORY  Smoker  Polymyalgia rheumatica  Abdominal aortic aneurysm (AAA) without rupture  Essential hypertension  Falls  TIA (transient ischemic attack)  H/O abdominal aortic aneurysm repair    SOCIAL HISTORY:  Negative for smoking/alcohol/drug use.     ALLERGIES:  Fosamax (Other (Moderate))  Novacet (Rash)  penicillin (Rash)  Sulfac 10% (Rash)  Tylenol (Pruritus)    MEDICATIONS:  STANDING MEDICATIONS  amLODIPine   Tablet 10 milliGRAM(s) Oral daily  clopidogrel Tablet 75 milliGRAM(s) Oral daily  docusate sodium 100 milliGRAM(s) Oral three times a day  gabapentin 300 milliGRAM(s) Oral four times a day  heparin  Injectable 5000 Unit(s) SubCutaneous every 8 hours  losartan 50 milliGRAM(s) Oral daily  mirtazapine 7.5 milliGRAM(s) Oral at bedtime  predniSONE   Tablet 5 milliGRAM(s) Oral daily  senna 1 Tablet(s) Oral at bedtime  traMADol 25 milliGRAM(s) Oral two times a day    PRN MEDICATIONS  traMADol 25 milliGRAM(s) Oral two times a day PRN    VITALS:   T(F): 96.5  HR: 61  BP: 153/79  RR: 18  SpO2: --    LABS:                        13.1   7.64  )-----------( 209      ( 28 Aug 2018 22:41 )             40.9     08-28    140  |  103  |  42<H>  ----------------------------<  97  4.9   |  22  |  1.7<H>    Ca    9.5      28 Aug 2018 22:41    PT/INR - ( 28 Aug 2018 22:41 )   PT: 10.90 sec;   INR: 1.01 ratio    PTT - ( 28 Aug 2018 22:41 )  PTT:34.2 sec    RADIOLOGY:  < from: Xray Femur 2 Views, Right (08.23.18 @ 18:36) >  No radiographic evidence of acute acute osseous abnormality or osteonecrosis of right hip.  < end of copied text >    < from: Xray Hip w/ Pelvis 3-4 Views, Bilateral (08.23.18 @ 18:36) >  Osteopenia, without radiographic evidence of acute fracture or osteonecrosis of bilateral hips.  < end of copied text >    < from: MR Hip No Cont, Right (08.25.18 @ 20:49) >  Incomplete MRI of the right hip demonstrating findings highly suspicious for nondisplaced fracture of the right inferior subcapital femoral neck. Consider a noncontrast CT of the right hip for further evaluation.  < end of copied text >    < from: CT Hip No Cont, Right (08.27.18 @ 10:12) >  Diffuse osteopenia.    No definite acute displaced fracture or dislocation.    Cortical thickening and adjacent trabecular hyperdensity, corresponding to the MRI findings of signal abnormality at the inferior aspect of the right femoral neck, consistent with chronic stress reaction. However, acute on chronic stress reaction/nondisplaced trabecular stress fracture cannot be excluded.  < end of copied text >    < from: MR Hip No Cont, Right (08.27.18 @ 17:29) >  Acute nondisplaced right inferior femoral neck fracture, and right superior acetabular stress fracture.    Right proximal thigh muscular strain.  < end of copied text >    PHYSICAL EXAM:  GEN: No acute distress  LUNGS/ pulmonary : Clear to auscultation bilaterally   HEART / CVS : Regular  ABD: Soft, non-tender, non-distended.  EXT: NC/NC/NE/2+PP/FELICIANO/Skin Intact,  RLE active ROM limited by pain, passive ROM not limited  NEURO: AAOX3

## 2018-08-29 NOTE — PRE-ANESTHESIA EVALUATION ADULT - MALLAMPATI CLASS
Class III - visualization of the soft palate and the base of the uvula
missing teeth/Class III - visualization of the soft palate and the base of the uvula

## 2018-08-29 NOTE — CHART NOTE - NSCHARTNOTEFT_GEN_A_CORE
PACU ANESTHESIA ADMISSION NOTE      Procedure: Closed reduction and percutaneous pinning (CRPP) of injury of right hip    Post op diagnosis:  Closed fracture of right hip, initial encounter      ____  Intubated  TV:______       Rate: ______      FiO2: ______    _x___  Patent Airway    _x___  Full return of protective reflexes    _x___  Full recovery from anesthesia / back to baseline status    Vitals  SPO2:-97% on 3 l nc  HR:-92  RR:-14  B.P:-192/84  TEMP:-98.9    Mental Status:  _x___ Awake   ___x_ Alert   _____ Drowsy   _____ Sedated    Nausea/Vomiting:  _x___  NO       ______Yes,   See Post - Op Orders         Pain Scale (0-10):  __0___    Treatment: _x___ None    __x__ See Post - Op/PCA Orders    Post - Operative Fluids:   ___ Oral   ____x See Post - Op Orders    Plan: Discharge:   ____Home       ___x__Floor     _____Critical Care    _____  Other:_________________    Comments:  Report endorsed to RN in pacu  Vitals stable  No anesthesia issues or complications noted.  Discharge to patient to floor  when criteria met.

## 2018-08-29 NOTE — BRIEF OPERATIVE NOTE - PROCEDURE
<<-----Click on this checkbox to enter Procedure Closed reduction and percutaneous pinning (CRPP) of injury of right hip  08/29/2018    Active  BARBI

## 2018-08-30 LAB
ABO RH CONFIRMATION: SIGNIFICANT CHANGE UP
ANION GAP SERPL CALC-SCNC: 16 MMOL/L — HIGH (ref 7–14)
BUN SERPL-MCNC: 41 MG/DL — HIGH (ref 10–20)
CALCIUM SERPL-MCNC: 10 MG/DL — SIGNIFICANT CHANGE UP (ref 8.5–10.1)
CHLORIDE SERPL-SCNC: 102 MMOL/L — SIGNIFICANT CHANGE UP (ref 98–110)
CO2 SERPL-SCNC: 24 MMOL/L — SIGNIFICANT CHANGE UP (ref 17–32)
CREAT SERPL-MCNC: 1.8 MG/DL — HIGH (ref 0.7–1.5)
GLUCOSE SERPL-MCNC: 103 MG/DL — HIGH (ref 70–99)
HCT VFR BLD CALC: 45.8 % — SIGNIFICANT CHANGE UP (ref 37–47)
HGB BLD-MCNC: 14.5 G/DL — SIGNIFICANT CHANGE UP (ref 12–16)
MCHC RBC-ENTMCNC: 29.2 PG — SIGNIFICANT CHANGE UP (ref 27–31)
MCHC RBC-ENTMCNC: 31.7 G/DL — LOW (ref 32–37)
MCV RBC AUTO: 92.2 FL — SIGNIFICANT CHANGE UP (ref 81–99)
NRBC # BLD: 0 /100 WBCS — SIGNIFICANT CHANGE UP (ref 0–0)
PLATELET # BLD AUTO: 239 K/UL — SIGNIFICANT CHANGE UP (ref 130–400)
POTASSIUM SERPL-MCNC: 5.6 MMOL/L — HIGH (ref 3.5–5)
POTASSIUM SERPL-SCNC: 5.6 MMOL/L — HIGH (ref 3.5–5)
RBC # BLD: 4.97 M/UL — SIGNIFICANT CHANGE UP (ref 4.2–5.4)
RBC # FLD: 13.4 % — SIGNIFICANT CHANGE UP (ref 11.5–14.5)
SODIUM SERPL-SCNC: 142 MMOL/L — SIGNIFICANT CHANGE UP (ref 135–146)
WBC # BLD: 9.28 K/UL — SIGNIFICANT CHANGE UP (ref 4.8–10.8)
WBC # FLD AUTO: 9.28 K/UL — SIGNIFICANT CHANGE UP (ref 4.8–10.8)

## 2018-08-30 RX ADMIN — Medication 100 MILLIGRAM(S): at 13:52

## 2018-08-30 RX ADMIN — TRAMADOL HYDROCHLORIDE 25 MILLIGRAM(S): 50 TABLET ORAL at 06:40

## 2018-08-30 RX ADMIN — Medication 100 MILLIGRAM(S): at 06:11

## 2018-08-30 RX ADMIN — CLOPIDOGREL BISULFATE 75 MILLIGRAM(S): 75 TABLET, FILM COATED ORAL at 12:54

## 2018-08-30 RX ADMIN — MIRTAZAPINE 7.5 MILLIGRAM(S): 45 TABLET, ORALLY DISINTEGRATING ORAL at 22:13

## 2018-08-30 RX ADMIN — HEPARIN SODIUM 5000 UNIT(S): 5000 INJECTION INTRAVENOUS; SUBCUTANEOUS at 13:52

## 2018-08-30 RX ADMIN — Medication 100 MILLIGRAM(S): at 13:53

## 2018-08-30 RX ADMIN — Medication 100 MILLIGRAM(S): at 22:13

## 2018-08-30 RX ADMIN — Medication 5 MILLIGRAM(S): at 06:11

## 2018-08-30 RX ADMIN — GABAPENTIN 300 MILLIGRAM(S): 400 CAPSULE ORAL at 18:01

## 2018-08-30 RX ADMIN — SODIUM CHLORIDE 75 MILLILITER(S): 9 INJECTION, SOLUTION INTRAVENOUS at 06:56

## 2018-08-30 RX ADMIN — SODIUM CHLORIDE 75 MILLILITER(S): 9 INJECTION, SOLUTION INTRAVENOUS at 23:19

## 2018-08-30 RX ADMIN — TRAMADOL HYDROCHLORIDE 25 MILLIGRAM(S): 50 TABLET ORAL at 06:11

## 2018-08-30 RX ADMIN — SENNA PLUS 1 TABLET(S): 8.6 TABLET ORAL at 22:14

## 2018-08-30 RX ADMIN — LOSARTAN POTASSIUM 50 MILLIGRAM(S): 100 TABLET, FILM COATED ORAL at 06:11

## 2018-08-30 RX ADMIN — GABAPENTIN 300 MILLIGRAM(S): 400 CAPSULE ORAL at 12:55

## 2018-08-30 RX ADMIN — AMLODIPINE BESYLATE 10 MILLIGRAM(S): 2.5 TABLET ORAL at 06:11

## 2018-08-30 RX ADMIN — HEPARIN SODIUM 5000 UNIT(S): 5000 INJECTION INTRAVENOUS; SUBCUTANEOUS at 22:14

## 2018-08-30 RX ADMIN — GABAPENTIN 300 MILLIGRAM(S): 400 CAPSULE ORAL at 23:13

## 2018-08-30 RX ADMIN — GABAPENTIN 300 MILLIGRAM(S): 400 CAPSULE ORAL at 06:11

## 2018-08-30 RX ADMIN — TRAMADOL HYDROCHLORIDE 25 MILLIGRAM(S): 50 TABLET ORAL at 18:01

## 2018-08-30 NOTE — PROGRESS NOTE ADULT - ASSESSMENT
82 F w/ h/o HTN, TIA in 04/18, h/o AAA repair,  polymyalgia rheumatica on 5mg q24h prednisone, active smoker 1 PPD presented from home w/ worsening R groin and thigh pain for 1 week.    # Right hip pain. Occult fx vs AVN of femoral head in context of steroid use. s/p R CRPP POD1.   -- Imaging  - X-ray hip and knee negative  - MRI w/ acute nondisplaced right inferior femoral neck fracture, and right superior acetabular stress fracture  - CT R Hip noncon inconclusive  -- Plan  - f/u PT/Rehab  - Ortho following  - Pain control    # Hyperkalemia. Post-operative, likely due to anesthesia. 5.6 today, first episode.   - monitor BMP    # HTN:  - c/w losaratan    # TIA:  - c/w plavix    # CKD stage 4  - Cr at baseline    # DVT ppx - Heparin SC    Dispo: Resides alone at home - STR/SNF vs. home with VNS

## 2018-08-30 NOTE — PROGRESS NOTE ADULT - SUBJECTIVE AND OBJECTIVE BOX
ORTHO PROGRESS NOTE       82yFemale POD #  1    S/P CRPP right hip     Patient seen and examined at bedside. The patient is awake and alert in NAD. No complaints of chest pain, SOB, N/V. Pain controlled        DVT ppx  HSQ        PE:  Dressing were bloody new dressing placed          Compartments soft         NVI, SILT           A/P: 82yFemale POD # 1  S/P Closed reduction percutaneous pinning right hip for nondisplaced femoral neck fracture           post op abx x24 hrs           WBAT RLE           ice hip           OOB to Chair            Physical Therapy           Pain control            Incentive Spirometry            DVT Prophylaxis            Discharge planning

## 2018-08-30 NOTE — DIETITIAN INITIAL EVALUATION ADULT. - OTHER INFO
R hip pain. p/w worsening R groin and thigh pain for 1 wk. Pain control now. Xray was normal. C/w steriod. PT/rehab. CKD at baseline

## 2018-08-30 NOTE — DIETITIAN INITIAL EVALUATION ADULT. - DIET TYPE
Pt reported eating the food despite she thinks the food is gross. Sometimes daughter brings food from home too. She is not big on bread./DASH/TLC (sodium and cholesterol restricted diet)

## 2018-08-30 NOTE — DIETITIAN INITIAL EVALUATION ADULT. - PERTINENT MEDS FT
clopidogrel, heparin, abx, amlodipine, LR, losartan, docusate, morphine, oxy, prednisolone, tramadol, senna

## 2018-08-30 NOTE — OCCUPATIONAL THERAPY INITIAL EVALUATION ADULT - ADDITIONAL COMMENTS
Pt resides in private two family home with grandson living downstairs from her. 3-4 steps to enter home and 5-6 steps to get to second fl (main floor). bed/bath/ kitchen on same floor. +bathtub

## 2018-08-30 NOTE — DIETITIAN INITIAL EVALUATION ADULT. - ENERGY NEEDS
[id number] kcal/day (MSJ x 1.2-1.3)  55-61 g/day (1.0-1.1 g/kg of ABW) monitoring renal function  1ml/kcal

## 2018-08-30 NOTE — OCCUPATIONAL THERAPY INITIAL EVALUATION ADULT - GENERAL OBSERVATIONS, REHAB EVAL
Pt seen 13:30-13:57. Pt encountered in b/s chair in NAD, +chair alarm, +IV lock x2, +ugarte. Pt agreeable to OT negro

## 2018-08-30 NOTE — DIETITIAN INITIAL EVALUATION ADULT. - PHYSICAL APPEARANCE
well nourished/BMI is 23.2 (UBW reported between 112-115#, 4 weeks ago was 118# per her PMD) Per EMR currently, pt weighs 122#. ?reporting vs bedscale

## 2018-08-30 NOTE — PROGRESS NOTE ADULT - ATTENDING COMMENTS
al heartat
mri with femoral neck fx ,recommend  surgery ,needs med optimization ,surgery in AM
Patient seen and examined at the bed side reviewed the MRI and CT findings.   ortho follow ups.   PT evaluation for D/C planning
Patient seen and examined at the beds side along with resident and medical students.   patient is S/P IMN for the hip fracture.   Clinically more stable.   However has a ugarte now, placed over night for Urinary retention.   OOB to chair today.   Ambulate as tolerate today.   D/C ugarte am once patient is more functional and mobile.   Should be a good patient for acute rehab.
Patient seen and examined at the bed side.   Not in distress.   Agree with above note.   Ortho follow ups.   Patient is stable for the hip repair. no further testing required.   she is at moderate risk for a intermediate risk procedure.

## 2018-08-30 NOTE — PROGRESS NOTE ADULT - SUBJECTIVE AND OBJECTIVE BOX
Seen on FU  Consult done 8/24    SP CRPP R fem neck Fx on 8/29    Post op course uncomplicated    OOB to chair   R hip pain MS <3/5    Seen by PT today-transfers [facility] 5' RW    Patient wants to go home Alone    Daughter works At [employer]    Would continue bedside PT-add OT    Likely SNF for ST rehab once stable  Soc Service Notified

## 2018-08-30 NOTE — DIETITIAN INITIAL EVALUATION ADULT. - PT NOT SOURCE
other (specify)/LOS- pt is alert and oriented. no edema. surgical incision. LBM 2 days ago on Tuesday per pt 8/28. No oral problem.

## 2018-08-30 NOTE — PROGRESS NOTE ADULT - SUBJECTIVE AND OBJECTIVE BOX
CHIEF COMPLAINT:    Patient is a 82y old Female who presents with a chief complaint of Right hip pain (23 Aug 2018 20:44)    Currently admitted to medicine with the primary diagnosis of Right hip pain     Today is hospital day 7d. This morning she is resting comfortably in bed and reports no new issues or overnight events.     PAST MEDICAL & SURGICAL HISTORY  Smoker  Polymyalgia rheumatica  Abdominal aortic aneurysm (AAA) without rupture  Essential hypertension  Falls  TIA (transient ischemic attack)  H/O abdominal aortic aneurysm repair    SOCIAL HISTORY:  Negative for smoking/alcohol/drug use.     ALLERGIES:  Fosamax (Other (Moderate))  Novacet (Rash)  penicillin (Rash)  Sulfac 10% (Rash)  Tylenol (Pruritus)    MEDICATIONS:  STANDING MEDICATIONS  amLODIPine   Tablet 10 milliGRAM(s) Oral daily  clindamycin IVPB 600 milliGRAM(s) IV Intermittent every 8 hours  clopidogrel Tablet 75 milliGRAM(s) Oral daily  docusate sodium 100 milliGRAM(s) Oral three times a day  gabapentin 300 milliGRAM(s) Oral four times a day  heparin  Injectable 5000 Unit(s) SubCutaneous every 8 hours  lactated ringers. 1000 milliLiter(s) IV Continuous <Continuous>  losartan 50 milliGRAM(s) Oral daily  mirtazapine 7.5 milliGRAM(s) Oral at bedtime  predniSONE   Tablet 5 milliGRAM(s) Oral daily  senna 1 Tablet(s) Oral at bedtime  traMADol 25 milliGRAM(s) Oral two times a day    PRN MEDICATIONS  morphine  - Injectable 2 milliGRAM(s) IV Push every 4 hours PRN  oxyCODONE    IR 5 milliGRAM(s) Oral every 4 hours PRN    VITALS:   T(F): 96.7  HR: 57  BP: 137/79  RR: 18  SpO2: 97%    LABS:                        14.5   9.28  )-----------( 239      ( 30 Aug 2018 05:28 )             45.8     08-30    142  |  102  |  41<H>  ----------------------------<  103<H>  5.6<H>   |  24  |  1.8<H>    Ca    10.0      30 Aug 2018 05:28      PT/INR - ( 28 Aug 2018 22:41 )   PT: 10.90 sec;   INR: 1.01 ratio    PTT - ( 28 Aug 2018 22:41 )  PTT:34.2 sec    RADIOLOGY:  < from: Xray Femur 2 Views, Right (08.23.18 @ 18:36) >  No radiographic evidence of acute acute osseous abnormality or osteonecrosis of right hip.  < end of copied text >    < from: Xray Hip w/ Pelvis 3-4 Views, Bilateral (08.23.18 @ 18:36) >  Osteopenia, without radiographic evidence of acute fracture or osteonecrosis of bilateral hips.  < end of copied text >    < from: MR Hip No Cont, Right (08.25.18 @ 20:49) >  Incomplete MRI of the right hip demonstrating findings highly suspicious for nondisplaced fracture of the right inferior subcapital femoral neck. Consider a noncontrast CT of the right hip for further evaluation.  < end of copied text >    < from: CT Hip No Cont, Right (08.27.18 @ 10:12) >  Diffuse osteopenia.    No definite acute displaced fracture or dislocation.    Cortical thickening and adjacent trabecular hyperdensity, corresponding to the MRI findings of signal abnormality at the inferior aspect of the right femoral neck, consistent with chronic stress reaction. However, acute on chronic stress reaction/nondisplaced trabecular stress fracture cannot be excluded.  < end of copied text >    < from: MR Hip No Cont, Right (08.27.18 @ 17:29) >  Acute nondisplaced right inferior femoral neck fracture, and right superior acetabular stress fracture.    Right proximal thigh muscular strain.  < end of copied text >    PHYSICAL EXAM:  GEN: No acute distress  LUNGS/ pulmonary : Clear to auscultation bilaterally   HEART / CVS : Regular  ABD: Soft, non-tender, non-distended.  EXT: RLE access bandage clean and dry  NEURO: AAOX3

## 2018-08-31 ENCOUNTER — TRANSCRIPTION ENCOUNTER (OUTPATIENT)
Age: 83
End: 2018-08-31

## 2018-08-31 VITALS
RESPIRATION RATE: 18 BRPM | HEART RATE: 72 BPM | DIASTOLIC BLOOD PRESSURE: 78 MMHG | SYSTOLIC BLOOD PRESSURE: 187 MMHG | TEMPERATURE: 97 F

## 2018-08-31 RX ADMIN — TRAMADOL HYDROCHLORIDE 25 MILLIGRAM(S): 50 TABLET ORAL at 17:53

## 2018-08-31 RX ADMIN — Medication 100 MILLIGRAM(S): at 05:16

## 2018-08-31 RX ADMIN — Medication 100 MILLIGRAM(S): at 13:41

## 2018-08-31 RX ADMIN — CLOPIDOGREL BISULFATE 75 MILLIGRAM(S): 75 TABLET, FILM COATED ORAL at 11:10

## 2018-08-31 RX ADMIN — LOSARTAN POTASSIUM 50 MILLIGRAM(S): 100 TABLET, FILM COATED ORAL at 05:16

## 2018-08-31 RX ADMIN — GABAPENTIN 300 MILLIGRAM(S): 400 CAPSULE ORAL at 17:54

## 2018-08-31 RX ADMIN — Medication 5 MILLIGRAM(S): at 05:16

## 2018-08-31 RX ADMIN — HEPARIN SODIUM 5000 UNIT(S): 5000 INJECTION INTRAVENOUS; SUBCUTANEOUS at 05:17

## 2018-08-31 RX ADMIN — GABAPENTIN 300 MILLIGRAM(S): 400 CAPSULE ORAL at 05:16

## 2018-08-31 RX ADMIN — GABAPENTIN 300 MILLIGRAM(S): 400 CAPSULE ORAL at 11:10

## 2018-08-31 RX ADMIN — HEPARIN SODIUM 5000 UNIT(S): 5000 INJECTION INTRAVENOUS; SUBCUTANEOUS at 13:41

## 2018-08-31 RX ADMIN — TRAMADOL HYDROCHLORIDE 25 MILLIGRAM(S): 50 TABLET ORAL at 05:17

## 2018-08-31 RX ADMIN — AMLODIPINE BESYLATE 10 MILLIGRAM(S): 2.5 TABLET ORAL at 05:16

## 2018-08-31 NOTE — PROGRESS NOTE ADULT - SUBJECTIVE AND OBJECTIVE BOX
CHIEF COMPLAINT:  Patient is a 82y old Female who presents with a chief complaint of Right hip pain (23 Aug 2018 20:44)    Currently admitted to medicine with the primary diagnosis of Right hip pain     Today is hospital day 8d. This morning she is resting comfortably in bed and reports no new issues or overnight events.     PAST MEDICAL & SURGICAL HISTORY  Smoker  Polymyalgia rheumatica  Abdominal aortic aneurysm (AAA) without rupture  Essential hypertension  Falls  TIA (transient ischemic attack)  H/O abdominal aortic aneurysm repair    SOCIAL HISTORY:  Negative for smoking/alcohol/drug use.     ALLERGIES:  Fosamax (Other (Moderate))  Novacet (Rash)  penicillin (Rash)  Sulfac 10% (Rash)  Tylenol (Pruritus)    MEDICATIONS:  STANDING MEDICATIONS  amLODIPine   Tablet 10 milliGRAM(s) Oral daily  clopidogrel Tablet 75 milliGRAM(s) Oral daily  docusate sodium 100 milliGRAM(s) Oral three times a day  gabapentin 300 milliGRAM(s) Oral four times a day  heparin  Injectable 5000 Unit(s) SubCutaneous every 8 hours  lactated ringers. 1000 milliLiter(s) IV Continuous <Continuous>  losartan 50 milliGRAM(s) Oral daily  mirtazapine 7.5 milliGRAM(s) Oral at bedtime  predniSONE   Tablet 5 milliGRAM(s) Oral daily  senna 1 Tablet(s) Oral at bedtime  traMADol 25 milliGRAM(s) Oral two times a day    PRN MEDICATIONS  morphine  - Injectable 2 milliGRAM(s) IV Push every 4 hours PRN  oxyCODONE    IR 5 milliGRAM(s) Oral every 4 hours PRN    VITALS:   T(F): 97.3  HR: 61  BP: 145/75  RR: 18  SpO2: --    LABS:              14.5   9.28  )-----------( 239      ( 30 Aug 2018 05:28 )             45.8     08-30    142  |  102  |  41<H>  ----------------------------<  103<H>  5.6<H>   |  24  |  1.8<H>    Ca    10.0      30 Aug 2018 05:28    RADIOLOGY:  < from: Xray Femur 2 Views, Right (08.23.18 @ 18:36) >  No radiographic evidence of acute acute osseous abnormality or osteonecrosis of right hip.  < end of copied text >    < from: Xray Hip w/ Pelvis 3-4 Views, Bilateral (08.23.18 @ 18:36) >  Osteopenia, without radiographic evidence of acute fracture or osteonecrosis of bilateral hips.  < end of copied text >    < from: MR Hip No Cont, Right (08.25.18 @ 20:49) >  Incomplete MRI of the right hip demonstrating findings highly suspicious for nondisplaced fracture of the right inferior subcapital femoral neck. Consider a noncontrast CT of the right hip for further evaluation.  < end of copied text >    < from: CT Hip No Cont, Right (08.27.18 @ 10:12) >  Diffuse osteopenia.    No definite acute displaced fracture or dislocation.    Cortical thickening and adjacent trabecular hyperdensity, corresponding to the MRI findings of signal abnormality at the inferior aspect of the right femoral neck, consistent with chronic stress reaction. However, acute on chronic stress reaction/nondisplaced trabecular stress fracture cannot be excluded.  < end of copied text >    < from: MR Hip No Cont, Right (08.27.18 @ 17:29) >  Acute nondisplaced right inferior femoral neck fracture, and right superior acetabular stress fracture.    Right proximal thigh muscular strain.  < end of copied text >    PHYSICAL EXAM:  GEN: No acute distress  LUNGS/ pulmonary : Clear to auscultation bilaterally   HEART / CVS : Regular  ABD: Soft, non-tender, non-distended.  EXT: RLE access bandage clean and dry  NEURO: AAOX3

## 2018-08-31 NOTE — DISCHARGE NOTE ADULT - HOSPITAL COURSE
82 F w/ h/o HTN, TIA in 04/18, h/o AAA repair,  polymyalgia rheumatica on 5mg q24h prednisone, active smoker 1 PPD presented from home w/ worsening R groin and thigh pain for 1 week.    # Acute nondisplaced right inferior femoral neck fracture, and right superior acetabular stress fracture. s/p R CRPP POD2.   -- Imaging  - X-ray hip and knee negative  - MRI w/ acute nondisplaced right inferior femoral neck fracture, and right superior acetabular stress fracture  - CT R Hip noncon inconclusive  -- Plan  - OP ortho f/u 82 F w/ h/o HTN, TIA in 04/18, h/o AAA repair,  polymyalgia rheumatica on 5mg q24h prednisone, active smoker 1 PPD presented from home w/ worsening R groin and thigh pain for 1 week.    # Acute nondisplaced right inferior femoral neck fracture, and right superior acetabular stress fracture. s/p R CRPP POD2.   -- Imaging  - X-ray hip and knee negative  - MRI w/ acute nondisplaced right inferior femoral neck fracture, and right superior acetabular stress fracture  - CT R Hip noncon inconclusive  -- Plan  - OP ortho f/u     #Urinary Retention. 430mL post void on discharge.   - urinary catheter placed  - f/u encouraged

## 2018-08-31 NOTE — PROGRESS NOTE ADULT - ASSESSMENT
82 F w/ h/o HTN, TIA in 04/18, h/o AAA repair,  polymyalgia rheumatica on 5mg q24h prednisone, active smoker 1 PPD presented from home w/ worsening R groin and thigh pain for 1 week.    # Acute nondisplaced right inferior femoral neck fracture, and right superior acetabular stress fracture. s/p R CRPP  - MRI w/ acute nondisplaced right inferior femoral neck fracture, and right superior acetabular stress fracture  -- Plan  - f/u PT  - Ortho following  - Pain control    # Urinary retention. Postoperative. Bustamante placed.  - trial of void today    # Hyperkalemia. Post-operative, likely due to anesthesia. 5.6 today, first episode.   - monitor BMP    # HTN:  - c/w losaratan    # TIA:  - c/w plavix    # CKD stage 4  - Cr at baseline    # DVT ppx - Heparin SC    Dispo: Stable to d/c to SNF for ST rehab or acute rehab

## 2018-08-31 NOTE — PROGRESS NOTE ADULT - PROVIDER SPECIALTY LIST ADULT
Internal Medicine
Orthopedics
Physiatry
Internal Medicine

## 2018-08-31 NOTE — PROGRESS NOTE ADULT - ASSESSMENT
82 F w/ h/o HTN, TIA in 04/18, h/o AAA repair,  polymyalgia rheumatica on 5mg q24h prednisone, active smoker 1 PPD presented from home w/ worsening R groin and thigh pain for 1 week.    # Right hip pain. Occult fx vs AVN of femoral head in context of steroid use. s/p R CRPP POD2.   -- Imaging  - X-ray hip and knee negative  - MRI w/ acute nondisplaced right inferior femoral neck fracture, and right superior acetabular stress fracture  - CT R Hip noncon inconclusive  -- Plan  - f/u PT  - Ortho following  - Pain control    # Urinary retention. Postoperative. Bustamante placed.  - trial of void today    # Hyperkalemia. Post-operative, likely due to anesthesia. 5.6 today, first episode.   - monitor BMP    # HTN:  - c/w losaratan    # TIA:  - c/w plavix    # CKD stage 4  - Cr at baseline    # DVT ppx - Heparin SC    Dispo: Resides alone at home - SNF for ST rehab once stable per physiatry, but pt wants to go home 82 F w/ h/o HTN, TIA in 04/18, h/o AAA repair,  polymyalgia rheumatica on 5mg q24h prednisone, active smoker 1 PPD presented from home w/ worsening R groin and thigh pain for 1 week.    # Acute nondisplaced right inferior femoral neck fracture, and right superior acetabular stress fracture. s/p R CRPP POD2.   -- Imaging  - X-ray hip and knee negative  - MRI w/ acute nondisplaced right inferior femoral neck fracture, and right superior acetabular stress fracture  - CT R Hip noncon inconclusive  -- Plan  - f/u PT  - Ortho following  - Pain control    # Urinary retention. Postoperative. Bustamante placed.  - trial of void today    # Hyperkalemia. Post-operative, likely due to anesthesia. 5.6 today, first episode.   - monitor BMP    # HTN:  - c/w losaratan    # TIA:  - c/w plavix    # CKD stage 4  - Cr at baseline    # DVT ppx - Heparin SC    Dispo: Resides alone at home - SNF for ST rehab once stable per physiatry, but pt wants to go home

## 2018-08-31 NOTE — PROGRESS NOTE ADULT - SUBJECTIVE AND OBJECTIVE BOX
PROGRESS NOTE  Chief Complaint:  Patient is a 82y old  Female who presents with a chief complaint of Right hip pain (31 Aug 2018 10:49)      HPI:  82 F w/ h/o HTN, TIA in 04/18, h/o AAA repair,  polymyalgia rheumatica on 5mg q24h prednisone, active smoker 1 PPD presented from home w/ c/o worsening R groin and thigh pain for 1 week. Started suddenly, 9/10, going upto R thigh, knife like sharp, only with movement of R hip and R knee. She lives alone, independent w/ ADLs, gets some help from her grandson who lives downstairs. Patient has been getting PT since her diagnosis of TIA in 04/18 for gait dysfunction. She has been doing well and has been walking even without walker now but since the pain started she has pain on weight bearing on R leg so has been using walker again for a week. Denies any fever, chills, weakness, trauma to leg, any falls, any h/o cancer, any (23 Aug 2018 20:44)      ALLERGIES:  Fosamax (Other (Moderate))  Novacet (Rash)  penicillin (Rash)  Sulfac 10% (Rash)  Tylenol (Pruritus)      HOSPITAL MEDICATIONS:  MEDICATIONS  (STANDING):  amLODIPine   Tablet 10 milliGRAM(s) Oral daily  clopidogrel Tablet 75 milliGRAM(s) Oral daily  docusate sodium 100 milliGRAM(s) Oral three times a day  gabapentin 300 milliGRAM(s) Oral four times a day  heparin  Injectable 5000 Unit(s) SubCutaneous every 8 hours  losartan 50 milliGRAM(s) Oral daily  mirtazapine 7.5 milliGRAM(s) Oral at bedtime  predniSONE   Tablet 5 milliGRAM(s) Oral daily  senna 1 Tablet(s) Oral at bedtime  traMADol 25 milliGRAM(s) Oral two times a day    MEDICATIONS  (PRN):  morphine  - Injectable 2 milliGRAM(s) IV Push every 4 hours PRN Severe Pain (7 - 10)  oxyCODONE    IR 5 milliGRAM(s) Oral every 4 hours PRN Moderate Pain (4 - 6)      PMHX/PSHX:  Smoker  Polymyalgia rheumatica  Abdominal aortic aneurysm (AAA) without rupture  Essential hypertension  Falls  TIA (transient ischemic attack)  H/O abdominal aortic aneurysm repair      FAMILY HISTORY:  No pertinent family history in first degree relatives        General:  No wt loss, fevers, chills, night sweats, fatigue,   Eyes:  Good vision, no reported pain  ENT:  No sore throat, pain, runny nose, dysphagia  CV:  No pain, palpitations, hypo/hypertension  Resp:  No dyspnea, cough, tachypnea, wheezing  GI:  No pain, No nausea, No vomiting, No diarrhea, No constipation, No weight loss, No fever, No pruritis, No rectal bleeding, No tarry stools, No dysphagia,  :  No pain, bleeding, incontinence, nocturia  Muscle:  No pain, weakness  Neuro:  No weakness, tingling, memory problems  Psych:  No fatigue, insomnia, mood problems, depression  Endocrine:  No polyuria, polydipsia, cold/heat intolerance  Heme:  No petechiae, ecchymosis, easy bruisability  Skin:  No rash, tattoos, scars, edema      PHYSICAL EXAM:   Vital Signs:  Vital Signs Last 24 Hrs  T(C): 36.3 (31 Aug 2018 07:38), Max: 36.5 (30 Aug 2018 16:00)  T(F): 97.3 (31 Aug 2018 07:38), Max: 97.7 (30 Aug 2018 16:00)  HR: 61 (31 Aug 2018 07:38) (61 - 69)  BP: 145/75 (31 Aug 2018 07:38) (114/56 - 156/70)  RR: 18 (31 Aug 2018 07:38) (16 - 18)    GENERAL:  Appears stated age, well-groomed, well-nourished, no distress  HEENT:  NC/AT,  conjunctivae clear and pink, no thyromegaly, nodules, adenopathy, no JVD, sclera -anicteric  CHEST:  Full & symmetric excursion, no increased effort, breath sounds clear  HEART:  Regular rhythm, S1, S2, no murmur/rub/S3/S4, no abdominal bruit, no edema  ABDOMEN:  Soft, non-tender, non-distended, normoactive bowel sounds,  no masses ,no hepato-splenomegaly, no signs of chronic liver disease  EXTEREMITIES:  no cyanosis,clubbing or edema  SKIN:  No rash/erythema/ecchymoses/petechiae/wounds/abscess/warm/dry  NEURO:  Alert, oriented, no asterixis, no tremor, no encephalopathy    LABS:                        14.5   9.28  )-----------( 239      ( 30 Aug 2018 05:28 )             45.8     08-30    142  |  102  |  41<H>  ----------------------------<  103<H>  5.6<H>   |  24  |  1.8<H>    Ca    10.0      30 Aug 2018 05:28

## 2018-08-31 NOTE — DISCHARGE NOTE ADULT - CARE PLAN
Principal Discharge DX:	Right hip pain Principal Discharge DX:	Femoral neck stress fracture, initial encounter  Goal:	Surgical management and outpatient follow-up  Assessment and plan of treatment:	Please continue to take medication as prescribed. Arrange follow-up with your orthopedic surgeon for two weeks from now. If you experience any worrying symptoms such as difficulty breathing, chest pain, intractable vomiting, several loose bowel movements, fevers, or chills please contact emergency personnel as soon as possible. Principal Discharge DX:	Femoral neck stress fracture, initial encounter  Goal:	Surgical management and outpatient follow-up  Assessment and plan of treatment:	Please continue to take medication as prescribed. Arrange follow-up with your orthopedic surgeon for two weeks from now. If you experience any worrying symptoms such as difficulty breathing, chest pain, intractable vomiting, several loose bowel movements, fevers, or chills please contact emergency personnel as soon as possible.  Secondary Diagnosis:	Urinary retention  Goal:	Outpatient follow-up and reevaluation  Assessment and plan of treatment:	You were retaining 430mL of urine post void and a catheter was placed. Please follow-up with your primary care provider to be reevaluated.

## 2018-08-31 NOTE — DISCHARGE NOTE ADULT - PATIENT PORTAL LINK FT
You can access the [facility], offered by [facility], by registering with the following website: [email or web address]

## 2018-08-31 NOTE — DISCHARGE NOTE ADULT - MEDICATION SUMMARY - MEDICATIONS TO TAKE
I will START or STAY ON the medications listed below when I get home from the hospital:    predniSONE 5 mg oral tablet  -- 1 tab(s) by mouth once a day  -- Indication: For Polymyalgia rheumatica    LOSARTAN POT TAB 50MG  -- 1  by mouth once a day  -- Indication: For HTN    GABAPENTIN   /5ML  -- 5 milliliter(s) by mouth 4 times a day  -- Indication: For Neuropathy    MIRTAZAPINE  TAB 7.5MG  -- 1  by mouth once a day  -- Indication: For Depresion    CLOPIDOGREL  TAB 75MG  -- 1  by mouth once a day  -- Indication: For TIA    amLODIPine 5 mg oral tablet  -- 1 tab(s) by mouth once a day  -- Indication: For HTN

## 2018-08-31 NOTE — DISCHARGE NOTE ADULT - CARE PROVIDER_API CALL
Burak Hagen (MD), Surgery  [address] [place] [address]  Phone: [phone]  Fax: [phone]

## 2018-08-31 NOTE — DISCHARGE NOTE ADULT - PLAN OF CARE
Please continue to take medication as prescribed. Arrange follow-up with your orthopedic surgeon for two weeks from now. If you experience any worrying symptoms such as difficulty breathing, chest pain, intractable vomiting, several loose bowel movements, fevers, or chills please contact emergency personnel as soon as possible. Surgical management and outpatient follow-up Outpatient follow-up and reevaluation You were retaining 430mL of urine post void and a catheter was placed. Please follow-up with your primary care provider to be reevaluated.

## 2018-08-31 NOTE — PROGRESS NOTE ADULT - SUBJECTIVE AND OBJECTIVE BOX
POD#2 S/P ORIF HIP  T(C): 36.3 (08-31-18 @ 07:38), Max: 36.5 (08-30-18 @ 16:00)  HR: 61 (08-31-18 @ 07:38) (61 - 69)  BP: 145/75 (08-31-18 @ 07:38) (114/56 - 156/70)  RR: 18 (08-31-18 @ 07:38) (16 - 18)  SpO2: --                        14.5   9.28  )-----------( 239      ( 30 Aug 2018 05:28 )             45.8     08-30    142  |  102  |  41<H>  ----------------------------<  103<H>  5.6<H>   |  24  |  1.8<H>    Ca    10.0      30 Aug 2018 05:28        PTS PAIN IS CONTROLLED   WOUND IS CDI DRESSING CHANGED  N/V/I  ABX COMPLETE  DVT PROPHYLAXIS IN PLACE  REHAB IN PROGRESS  D/C PLAN PENDING POD#2 S/P ORIF HIP  T(C): 36.3 (08-31-18 @ 07:38), Max: 36.5 (08-30-18 @ 16:00)  HR: 61 (08-31-18 @ 07:38) (61 - 69)  BP: 145/75 (08-31-18 @ 07:38) (114/56 - 156/70)  RR: 18 (08-31-18 @ 07:38) (16 - 18)  SpO2: --                        14.5   9.28  )-----------( 239      ( 30 Aug 2018 05:28 )             45.8     08-30    142  |  102  |  41<H>  ----------------------------<  103<H>  5.6<H>   |  24  |  1.8<H>    Ca    10.0      30 Aug 2018 05:28        PTS PAIN IS CONTROLLED   WOUND IS CDI DRESSING CHANGED  N/V/I  ABX COMPLETE  DVT PROPHYLAXIS IN PLACE  REHAB IN PROGRESS  D/C PLAN PENDING  patient doing well much less pain  n/v intact  dc planning

## 2018-10-05 ENCOUNTER — INPATIENT (INPATIENT)
Facility: HOSPITAL | Age: 83
LOS: 10 days | Discharge: NURSING, REHAB OR LONG-TERM CARE | End: 2018-10-16
Attending: HOSPITALIST | Admitting: HOSPITALIST

## 2018-10-05 VITALS
WEIGHT: 115.08 LBS | HEIGHT: 61 IN | HEART RATE: 88 BPM | OXYGEN SATURATION: 94 % | SYSTOLIC BLOOD PRESSURE: 132 MMHG | TEMPERATURE: 98 F | DIASTOLIC BLOOD PRESSURE: 73 MMHG | RESPIRATION RATE: 18 BRPM

## 2018-10-05 DIAGNOSIS — Z98.890 OTHER SPECIFIED POSTPROCEDURAL STATES: Chronic | ICD-10-CM

## 2018-10-05 PROBLEM — M35.3 POLYMYALGIA RHEUMATICA: Chronic | Status: ACTIVE | Noted: 2018-08-23

## 2018-10-05 PROBLEM — F17.200 NICOTINE DEPENDENCE, UNSPECIFIED, UNCOMPLICATED: Chronic | Status: ACTIVE | Noted: 2018-08-23

## 2018-10-05 LAB
ALBUMIN SERPL ELPH-MCNC: 4 G/DL — SIGNIFICANT CHANGE UP (ref 3.5–5.2)
ALP SERPL-CCNC: 123 U/L — HIGH (ref 30–115)
ALT FLD-CCNC: 9 U/L — SIGNIFICANT CHANGE UP (ref 0–41)
ANION GAP SERPL CALC-SCNC: 13 MMOL/L — SIGNIFICANT CHANGE UP (ref 7–14)
AST SERPL-CCNC: 24 U/L — SIGNIFICANT CHANGE UP (ref 0–41)
BASOPHILS # BLD AUTO: 0.03 K/UL — SIGNIFICANT CHANGE UP (ref 0–0.2)
BASOPHILS NFR BLD AUTO: 0.2 % — SIGNIFICANT CHANGE UP (ref 0–1)
BILIRUB SERPL-MCNC: 0.5 MG/DL — SIGNIFICANT CHANGE UP (ref 0.2–1.2)
BUN SERPL-MCNC: 30 MG/DL — HIGH (ref 10–20)
CALCIUM SERPL-MCNC: 9.7 MG/DL — SIGNIFICANT CHANGE UP (ref 8.5–10.1)
CHLORIDE SERPL-SCNC: 98 MMOL/L — SIGNIFICANT CHANGE UP (ref 98–110)
CO2 SERPL-SCNC: 25 MMOL/L — SIGNIFICANT CHANGE UP (ref 17–32)
CREAT SERPL-MCNC: 2.2 MG/DL — HIGH (ref 0.7–1.5)
EOSINOPHIL # BLD AUTO: 0.01 K/UL — SIGNIFICANT CHANGE UP (ref 0–0.7)
EOSINOPHIL NFR BLD AUTO: 0.1 % — SIGNIFICANT CHANGE UP (ref 0–8)
GLUCOSE SERPL-MCNC: 100 MG/DL — HIGH (ref 70–99)
HCT VFR BLD CALC: 43.3 % — SIGNIFICANT CHANGE UP (ref 37–47)
HGB BLD-MCNC: 14.1 G/DL — SIGNIFICANT CHANGE UP (ref 12–16)
IMM GRANULOCYTES NFR BLD AUTO: 0.4 % — HIGH (ref 0.1–0.3)
LYMPHOCYTES # BLD AUTO: 1.24 K/UL — SIGNIFICANT CHANGE UP (ref 1.2–3.4)
LYMPHOCYTES # BLD AUTO: 8 % — LOW (ref 20.5–51.1)
MCHC RBC-ENTMCNC: 29.1 PG — SIGNIFICANT CHANGE UP (ref 27–31)
MCHC RBC-ENTMCNC: 32.6 G/DL — SIGNIFICANT CHANGE UP (ref 32–37)
MCV RBC AUTO: 89.3 FL — SIGNIFICANT CHANGE UP (ref 81–99)
MONOCYTES # BLD AUTO: 0.78 K/UL — HIGH (ref 0.1–0.6)
MONOCYTES NFR BLD AUTO: 5 % — SIGNIFICANT CHANGE UP (ref 1.7–9.3)
NEUTROPHILS # BLD AUTO: 13.45 K/UL — HIGH (ref 1.4–6.5)
NEUTROPHILS NFR BLD AUTO: 86.3 % — HIGH (ref 42.2–75.2)
NRBC # BLD: 0 /100 WBCS — SIGNIFICANT CHANGE UP (ref 0–0)
PLATELET # BLD AUTO: 322 K/UL — SIGNIFICANT CHANGE UP (ref 130–400)
POTASSIUM SERPL-MCNC: 5.7 MMOL/L — HIGH (ref 3.5–5)
POTASSIUM SERPL-SCNC: 5.7 MMOL/L — HIGH (ref 3.5–5)
PROT SERPL-MCNC: 7 G/DL — SIGNIFICANT CHANGE UP (ref 6–8)
RBC # BLD: 4.85 M/UL — SIGNIFICANT CHANGE UP (ref 4.2–5.4)
RBC # FLD: 13.7 % — SIGNIFICANT CHANGE UP (ref 11.5–14.5)
SODIUM SERPL-SCNC: 136 MMOL/L — SIGNIFICANT CHANGE UP (ref 135–146)
WBC # BLD: 15.57 K/UL — HIGH (ref 4.8–10.8)
WBC # FLD AUTO: 15.57 K/UL — HIGH (ref 4.8–10.8)

## 2018-10-05 RX ORDER — LANOLIN ALCOHOL/MO/W.PET/CERES
3 CREAM (GRAM) TOPICAL AT BEDTIME
Qty: 0 | Refills: 0 | Status: DISCONTINUED | OUTPATIENT
Start: 2018-10-05 | End: 2018-10-07

## 2018-10-05 RX ORDER — CLOPIDOGREL BISULFATE 75 MG/1
1 TABLET, FILM COATED ORAL
Qty: 0 | Refills: 0 | COMMUNITY

## 2018-10-05 RX ORDER — SENNA PLUS 8.6 MG/1
2 TABLET ORAL AT BEDTIME
Qty: 0 | Refills: 0 | Status: DISCONTINUED | OUTPATIENT
Start: 2018-10-05 | End: 2018-10-07

## 2018-10-05 RX ORDER — MORPHINE SULFATE 50 MG/1
4 CAPSULE, EXTENDED RELEASE ORAL ONCE
Qty: 0 | Refills: 0 | Status: DISCONTINUED | OUTPATIENT
Start: 2018-10-05 | End: 2018-10-05

## 2018-10-05 RX ORDER — AMLODIPINE BESYLATE 2.5 MG/1
5 TABLET ORAL DAILY
Qty: 0 | Refills: 0 | Status: DISCONTINUED | OUTPATIENT
Start: 2018-10-05 | End: 2018-10-07

## 2018-10-05 RX ORDER — DOCUSATE SODIUM 100 MG
100 CAPSULE ORAL THREE TIMES A DAY
Qty: 0 | Refills: 0 | Status: DISCONTINUED | OUTPATIENT
Start: 2018-10-05 | End: 2018-10-07

## 2018-10-05 RX ORDER — SENNA PLUS 8.6 MG/1
2 TABLET ORAL AT BEDTIME
Qty: 0 | Refills: 0 | Status: DISCONTINUED | OUTPATIENT
Start: 2018-10-05 | End: 2018-10-05

## 2018-10-05 RX ORDER — MORPHINE SULFATE 50 MG/1
4 CAPSULE, EXTENDED RELEASE ORAL EVERY 4 HOURS
Qty: 0 | Refills: 0 | Status: DISCONTINUED | OUTPATIENT
Start: 2018-10-05 | End: 2018-10-06

## 2018-10-05 RX ORDER — SODIUM CHLORIDE 9 MG/ML
1000 INJECTION, SOLUTION INTRAVENOUS ONCE
Qty: 0 | Refills: 0 | Status: COMPLETED | OUTPATIENT
Start: 2018-10-05 | End: 2018-10-05

## 2018-10-05 RX ORDER — HEPARIN SODIUM 5000 [USP'U]/ML
5000 INJECTION INTRAVENOUS; SUBCUTANEOUS EVERY 12 HOURS
Qty: 0 | Refills: 0 | Status: DISCONTINUED | OUTPATIENT
Start: 2018-10-05 | End: 2018-10-07

## 2018-10-05 RX ORDER — MINERAL OIL/MAG HYDROX 25 %-6 %
30 EMULSION ORAL EVERY 6 HOURS
Qty: 0 | Refills: 0 | Status: DISCONTINUED | OUTPATIENT
Start: 2018-10-05 | End: 2018-10-05

## 2018-10-05 RX ORDER — SODIUM CHLORIDE 9 MG/ML
1000 INJECTION INTRAMUSCULAR; INTRAVENOUS; SUBCUTANEOUS
Qty: 0 | Refills: 0 | Status: DISCONTINUED | OUTPATIENT
Start: 2018-10-05 | End: 2018-10-06

## 2018-10-05 RX ORDER — LOSARTAN POTASSIUM 100 MG/1
50 TABLET, FILM COATED ORAL DAILY
Qty: 0 | Refills: 0 | Status: DISCONTINUED | OUTPATIENT
Start: 2018-10-05 | End: 2018-10-05

## 2018-10-05 RX ORDER — CLOPIDOGREL BISULFATE 75 MG/1
75 TABLET, FILM COATED ORAL DAILY
Qty: 0 | Refills: 0 | Status: DISCONTINUED | OUTPATIENT
Start: 2018-10-05 | End: 2018-10-07

## 2018-10-05 RX ORDER — POLYETHYLENE GLYCOL 3350 17 G/17G
17 POWDER, FOR SOLUTION ORAL DAILY
Qty: 0 | Refills: 0 | Status: DISCONTINUED | OUTPATIENT
Start: 2018-10-05 | End: 2018-10-07

## 2018-10-05 RX ORDER — MIRTAZAPINE 45 MG/1
7.5 TABLET, ORALLY DISINTEGRATING ORAL AT BEDTIME
Qty: 0 | Refills: 0 | Status: DISCONTINUED | OUTPATIENT
Start: 2018-10-05 | End: 2018-10-07

## 2018-10-05 RX ORDER — LACTULOSE 10 G/15ML
15 SOLUTION ORAL THREE TIMES A DAY
Qty: 0 | Refills: 0 | Status: DISCONTINUED | OUTPATIENT
Start: 2018-10-05 | End: 2018-10-07

## 2018-10-05 RX ORDER — INFLUENZA VIRUS VACCINE 15; 15; 15; 15 UG/.5ML; UG/.5ML; UG/.5ML; UG/.5ML
0.5 SUSPENSION INTRAMUSCULAR ONCE
Qty: 0 | Refills: 0 | Status: COMPLETED | OUTPATIENT
Start: 2018-10-05 | End: 2018-10-05

## 2018-10-05 RX ORDER — MIRTAZAPINE 45 MG/1
1 TABLET, ORALLY DISINTEGRATING ORAL
Qty: 0 | Refills: 0 | COMMUNITY

## 2018-10-05 RX ORDER — LOSARTAN POTASSIUM 100 MG/1
1 TABLET, FILM COATED ORAL
Qty: 0 | Refills: 0 | COMMUNITY

## 2018-10-05 RX ADMIN — LACTULOSE 15 GRAM(S): 10 SOLUTION ORAL at 21:02

## 2018-10-05 RX ADMIN — SODIUM CHLORIDE 1000 MILLILITER(S): 9 INJECTION, SOLUTION INTRAVENOUS at 17:45

## 2018-10-05 RX ADMIN — MORPHINE SULFATE 4 MILLIGRAM(S): 50 CAPSULE, EXTENDED RELEASE ORAL at 21:02

## 2018-10-05 RX ADMIN — Medication 5 MILLIGRAM(S): at 21:02

## 2018-10-05 RX ADMIN — MORPHINE SULFATE 4 MILLIGRAM(S): 50 CAPSULE, EXTENDED RELEASE ORAL at 16:01

## 2018-10-05 RX ADMIN — SENNA PLUS 2 TABLET(S): 8.6 TABLET ORAL at 21:03

## 2018-10-05 RX ADMIN — MORPHINE SULFATE 4 MILLIGRAM(S): 50 CAPSULE, EXTENDED RELEASE ORAL at 21:17

## 2018-10-05 RX ADMIN — Medication 100 MILLIGRAM(S): at 21:02

## 2018-10-05 RX ADMIN — MORPHINE SULFATE 4 MILLIGRAM(S): 50 CAPSULE, EXTENDED RELEASE ORAL at 17:00

## 2018-10-05 RX ADMIN — AMLODIPINE BESYLATE 5 MILLIGRAM(S): 2.5 TABLET ORAL at 21:06

## 2018-10-05 RX ADMIN — SODIUM CHLORIDE 75 MILLILITER(S): 9 INJECTION INTRAMUSCULAR; INTRAVENOUS; SUBCUTANEOUS at 20:04

## 2018-10-05 RX ADMIN — MIRTAZAPINE 7.5 MILLIGRAM(S): 45 TABLET, ORALLY DISINTEGRATING ORAL at 21:02

## 2018-10-05 RX ADMIN — MORPHINE SULFATE 4 MILLIGRAM(S): 50 CAPSULE, EXTENDED RELEASE ORAL at 18:17

## 2018-10-05 NOTE — ED ADULT TRIAGE NOTE - CHIEF COMPLAINT QUOTE
pt states she fell last night while walking with her walker and landed on her L side. Pt was unable to get up and her grandson picked her up-today c/o pain R thigh. Pt has history of rhipsurgery but is unsure as to what was done

## 2018-10-05 NOTE — H&P ADULT - NSHPLABSRESULTS_GEN_ALL_CORE
CBC Full  -  ( 05 Oct 2018 15:15 )  WBC Count : 15.57 K/uL  Hemoglobin : 14.1 g/dL  Hematocrit : 43.3 %  Platelet Count - Automated : 322 K/uL  Mean Cell Volume : 89.3 fL  Mean Cell Hemoglobin : 29.1 pg  Mean Cell Hemoglobin Concentration : 32.6 g/dL  Auto Neutrophil # : 13.45 K/uL  Auto Lymphocyte # : 1.24 K/uL  Auto Monocyte # : 0.78 K/uL  Auto Eosinophil # : 0.01 K/uL  Auto Basophil # : 0.03 K/uL  Auto Neutrophil % : 86.3 %  Auto Lymphocyte % : 8.0 %  Auto Monocyte % : 5.0 %  Auto Eosinophil % : 0.1 %  Auto Basophil % : 0.2 %    BMP:    10-05 @ 15:15    Blood Urea Nitrogen - 30  Calcium - 9.7  Carbon Dioxide - 25  Chloride - 98  Creatinine - 2.2  Glucose - 100  Potassium - 5.7  Sodium - 136

## 2018-10-05 NOTE — H&P ADULT - NSHPPHYSICALEXAM_GEN_ALL_CORE
PHYSICAL EXAM:  GENERAL: NAD, frail   HEAD:  Atraumatic, Normocephalic  EYES: EOMI, PERRLA, conjunctiva and sclera clear  NECK: Supple, No JVD  CHEST/LUNG: Clear to auscultation bilaterally; No wheeze  HEART: Regular rate and rhythm; No murmurs, rubs, or gallops  ABDOMEN: Soft, Nontender, Nondistended; Bowel sounds present  EXTREMITIES:  limited due to pain   PSYCH: AAOx3  NEUROLOGY: limited due to pain   SKIN: No rashes or lesions

## 2018-10-05 NOTE — ED PROVIDER NOTE - OBJECTIVE STATEMENT
82 F w/ h/o HTN, TIA, h/o AAA repair, polymyalgia rheumatica, with recent acute nondisplaced right inferior femoral neck fracture, and right superior acetabular stress fracture s/p R CRPP presenting for evaluation s/p mechanical fall last night. Patient states she had extensive physical therapy session at home and was exhausted. Patient states she was walking with walker tripped and fell onto her left side now c/o pain to right hip/RLE. No head injury or LOC. No cp, sob, fever, abdominal pain, nausea, vomiting, diarrhea, back pain, urinary symptoms, headache, dizziness, paresthesias, or weakness. 82 F with pmhx of HTN, TIA, h/o AAA repair, polymyalgia rheumatica, with recent acute nondisplaced right inferior femoral neck fracture, and right superior acetabular stress fracture s/p R CRPP presenting for evaluation s/p mechanical fall last night. Patient states she had extensive physical therapy session at home and was exhausted. Patient states she was walking with walker tripped and fell onto her left side now c/o pain to right hip/RLE. No head injury or LOC. No cp, sob, fever, abdominal pain, nausea, vomiting, diarrhea, back pain, urinary symptoms, headache, dizziness, paresthesias, or weakness.

## 2018-10-05 NOTE — ED ADULT NURSE NOTE - NSIMPLEMENTINTERV_GEN_ALL_ED
Implemented All Fall with Harm Risk Interventions:  [place] to call system. Call bell, personal items and telephone within reach. Instruct patient to call for assistance. Room bathroom lighting operational. Non-slip footwear when patient is off stretcher. Physically safe environment: no spills, clutter or unnecessary equipment. Stretcher in lowest position, wheels locked, appropriate side rails in place. Provide visual cue, wrist band, yellow gown, etc. Monitor gait and stability. Monitor for mental status changes and reorient to person, place, and time. Review medications for side effects contributing to fall risk. Reinforce activity limits and safety measures with patient and family. Provide visual clues: red socks.

## 2018-10-05 NOTE — ED ADULT NURSE REASSESSMENT NOTE - NS ED NURSE REASSESS COMMENT FT1
Pt states "I urinated last night after I fell and this morning in bed a lot because it hurt to get up. Pt pre medicated for turning and bed pan, pt states "I don't have to go". ED and hospitalist made aware, pt educated about ugarte catheter, pt verbalized back understanding, pt states "No I've had that before, I don't want it". ED MD and hospitalist aware, pt refuse straight catheter and also Ugarte catheter.

## 2018-10-05 NOTE — ED PROVIDER NOTE - NS ED ROS FT
Review of Systems:  	•	CONSTITUTIONAL - no fever, no diaphoresis, no chills  	•	SKIN - no rash  	•	HEMATOLOGIC - no bleeding, no bruising  	•	EYES - no eye pain, no blurry vision  	•	ENT - no congestion  	•	RESPIRATORY - no shortness of breath  	•	CARDIAC - no chest pain, no palpitations  	•	GI - no abd pain, no nausea, no vomiting  	•	GENITO-URINARY - no dysuria  	•	MUSCULOSKELETAL - +R hip pain, +RLE pain, no swelling, no redness  	•	NEUROLOGIC - no weakness, no headache, no paresthesias, no LOC  	All other ROS are negative except as documented in HPI.

## 2018-10-05 NOTE — ED PROVIDER NOTE - PROGRESS NOTE DETAILS
Spoke to Ortho PA regarding right hip fx who recommends non-weight bearing to RLE and CT right hip noncontrast DISCUSSED CT RESULTS WITH ORTHOPEDICS PA RECOMMENDING NON WEIGHT BEARING TO RLE XRAY WEEKLY FOR THE NEXT 3 WEEKS STATES HE WILL DISCUSS CASE WITH PT SURGEON DR. PATTON.

## 2018-10-05 NOTE — ED PROVIDER NOTE - ATTENDING CONTRIBUTION TO CARE
83 y/o F PMH HTN, TIA, h/o AAA repair, polymyalgia rheumatica, with recent pin fixation of a nondisplaced right inferior femoral neck fracture (08/2018 by Dr. Hagen) presenting for evaluation s/p mechanical fall last night. Patient states she had extensive physical therapy session at home and was exhausted. Patient states she was walking with walker tripped and fell onto her left side now c/o pain to right hip/RLE. No head injury or LOC. No cp, sob, fever, abdominal pain, nausea, vomiting, diarrhea, back pain, urinary symptoms, headache, dizziness, paresthesias, or weakness.    X-rays done and concerning for a new acute fracture by pinned area.  Orthopedics consulted.  Will get CT scan of hip.  Will send labs.  Will discuss with Orthopedics after completion of work up regarding possible need for admission and non-weight bearing status.

## 2018-10-05 NOTE — ED ADULT NURSE NOTE - CHPI ED NUR SYMPTOMS NEG
no tingling/no weakness/no numbness/no fever/no difficulty bearing weight/no deformity/no back pain/no bruising

## 2018-10-05 NOTE — H&P ADULT - HISTORY OF PRESENT ILLNESS
82 F with pmhx of HTN, TIA, h/o AAA repair, polymyalgia rheumatica, with recent acute nondisplaced right inferior femoral neck fracture, and right superior acetabular stress fracture s/p R CRPP presenting for evaluation s/p mechanical fall last night. Patient states she had extensive physical therapy session at home and was exhausted. She was walking with walker tripped and fell onto her left side now c/o pain to right hip/RLE. No head injury or LOC. No cp, sob, fever, abdominal pain, nausea, vomiting, diarrhea, back pain, urinary symptoms, headache, dizziness, paresthesias, or weakness.

## 2018-10-05 NOTE — ED ADULT NURSE NOTE - PMH
Abdominal aortic aneurysm (AAA) without rupture    Essential hypertension    Falls    Hip fracture    Polymyalgia rheumatica    Smoker    TIA (transient ischemic attack)

## 2018-10-05 NOTE — ED PROVIDER NOTE - PHYSICAL EXAMINATION
VITAL SIGNS: I have reviewed nursing notes and confirm.  CONSTITUTIONAL: Well-developed; well-nourished; in no acute distress.  SKIN: Skin exam is warm and dry, no acute rash.  HEAD: Normocephalic; atraumatic.  EYES: PERRL, EOM intact; conjunctiva and sclera clear.  ENT: No nasal discharge; airway clear.   NECK: Supple; non tender.  CARD: S1, S2 normal; no murmurs, gallops, or rubs. Regular rate and rhythm.  RESP: Clear to auscultation bilaterally. No wheezes, rales or rhonchi.  ABD: Normal bowel sounds; soft; non-distended; non-tender.   EXT: +Decreased ROM to RLE. Mild tenderness to right hip with painful ROM. Dorsalis pedis pulses 2+ bilaterally.   LYMPH: No acute cervical adenopathy.  NEURO: Alert, oriented. Grossly unremarkable. No focal deficits.  PSYCH: Cooperative, appropriate.

## 2018-10-06 LAB
ALBUMIN SERPL ELPH-MCNC: 3.3 G/DL — LOW (ref 3.5–5.2)
ALP SERPL-CCNC: 101 U/L — SIGNIFICANT CHANGE UP (ref 30–115)
ALT FLD-CCNC: 6 U/L — SIGNIFICANT CHANGE UP (ref 0–41)
ANION GAP SERPL CALC-SCNC: 17 MMOL/L — HIGH (ref 7–14)
AST SERPL-CCNC: 12 U/L — SIGNIFICANT CHANGE UP (ref 0–41)
BILIRUB SERPL-MCNC: 0.5 MG/DL — SIGNIFICANT CHANGE UP (ref 0.2–1.2)
BUN SERPL-MCNC: 23 MG/DL — HIGH (ref 10–20)
CALCIUM SERPL-MCNC: 8.7 MG/DL — SIGNIFICANT CHANGE UP (ref 8.5–10.1)
CHLORIDE SERPL-SCNC: 102 MMOL/L — SIGNIFICANT CHANGE UP (ref 98–110)
CO2 SERPL-SCNC: 21 MMOL/L — SIGNIFICANT CHANGE UP (ref 17–32)
CREAT SERPL-MCNC: 1.5 MG/DL — SIGNIFICANT CHANGE UP (ref 0.7–1.5)
GLUCOSE SERPL-MCNC: 89 MG/DL — SIGNIFICANT CHANGE UP (ref 70–99)
HCT VFR BLD CALC: 39.1 % — SIGNIFICANT CHANGE UP (ref 37–47)
HGB BLD-MCNC: 12.6 G/DL — SIGNIFICANT CHANGE UP (ref 12–16)
MCHC RBC-ENTMCNC: 29.5 PG — SIGNIFICANT CHANGE UP (ref 27–31)
MCHC RBC-ENTMCNC: 32.2 G/DL — SIGNIFICANT CHANGE UP (ref 32–37)
MCV RBC AUTO: 91.6 FL — SIGNIFICANT CHANGE UP (ref 81–99)
NRBC # BLD: 0 /100 WBCS — SIGNIFICANT CHANGE UP (ref 0–0)
PLATELET # BLD AUTO: 269 K/UL — SIGNIFICANT CHANGE UP (ref 130–400)
POTASSIUM SERPL-MCNC: 4.4 MMOL/L — SIGNIFICANT CHANGE UP (ref 3.5–5)
POTASSIUM SERPL-SCNC: 4.4 MMOL/L — SIGNIFICANT CHANGE UP (ref 3.5–5)
PROT SERPL-MCNC: 5.6 G/DL — LOW (ref 6–8)
RBC # BLD: 4.27 M/UL — SIGNIFICANT CHANGE UP (ref 4.2–5.4)
RBC # FLD: 13.6 % — SIGNIFICANT CHANGE UP (ref 11.5–14.5)
SODIUM SERPL-SCNC: 140 MMOL/L — SIGNIFICANT CHANGE UP (ref 135–146)
WBC # BLD: 10.59 K/UL — SIGNIFICANT CHANGE UP (ref 4.8–10.8)
WBC # FLD AUTO: 10.59 K/UL — SIGNIFICANT CHANGE UP (ref 4.8–10.8)

## 2018-10-06 RX ORDER — ONDANSETRON 8 MG/1
4 TABLET, FILM COATED ORAL EVERY 4 HOURS
Qty: 0 | Refills: 0 | Status: DISCONTINUED | OUTPATIENT
Start: 2018-10-06 | End: 2018-10-07

## 2018-10-06 RX ORDER — MORPHINE SULFATE 50 MG/1
2 CAPSULE, EXTENDED RELEASE ORAL EVERY 4 HOURS
Qty: 0 | Refills: 0 | Status: DISCONTINUED | OUTPATIENT
Start: 2018-10-06 | End: 2018-10-06

## 2018-10-06 RX ORDER — HYDROMORPHONE HYDROCHLORIDE 2 MG/ML
1 INJECTION INTRAMUSCULAR; INTRAVENOUS; SUBCUTANEOUS
Qty: 0 | Refills: 0 | Status: DISCONTINUED | OUTPATIENT
Start: 2018-10-06 | End: 2018-10-07

## 2018-10-06 RX ORDER — TRAMADOL HYDROCHLORIDE 50 MG/1
100 TABLET ORAL EVERY 12 HOURS
Qty: 0 | Refills: 0 | Status: DISCONTINUED | OUTPATIENT
Start: 2018-10-06 | End: 2018-10-06

## 2018-10-06 RX ORDER — LABETALOL HCL 100 MG
200 TABLET ORAL EVERY 12 HOURS
Qty: 0 | Refills: 0 | Status: DISCONTINUED | OUTPATIENT
Start: 2018-10-06 | End: 2018-10-07

## 2018-10-06 RX ADMIN — LACTULOSE 15 GRAM(S): 10 SOLUTION ORAL at 13:51

## 2018-10-06 RX ADMIN — MORPHINE SULFATE 2 MILLIGRAM(S): 50 CAPSULE, EXTENDED RELEASE ORAL at 12:23

## 2018-10-06 RX ADMIN — TRAMADOL HYDROCHLORIDE 100 MILLIGRAM(S): 50 TABLET ORAL at 10:23

## 2018-10-06 RX ADMIN — POLYETHYLENE GLYCOL 3350 17 GRAM(S): 17 POWDER, FOR SOLUTION ORAL at 11:55

## 2018-10-06 RX ADMIN — HEPARIN SODIUM 5000 UNIT(S): 5000 INJECTION INTRAVENOUS; SUBCUTANEOUS at 17:56

## 2018-10-06 RX ADMIN — HYDROMORPHONE HYDROCHLORIDE 1 MILLIGRAM(S): 2 INJECTION INTRAMUSCULAR; INTRAVENOUS; SUBCUTANEOUS at 14:35

## 2018-10-06 RX ADMIN — ONDANSETRON 4 MILLIGRAM(S): 8 TABLET, FILM COATED ORAL at 14:44

## 2018-10-06 RX ADMIN — CLOPIDOGREL BISULFATE 75 MILLIGRAM(S): 75 TABLET, FILM COATED ORAL at 11:55

## 2018-10-06 RX ADMIN — LACTULOSE 15 GRAM(S): 10 SOLUTION ORAL at 05:04

## 2018-10-06 RX ADMIN — AMLODIPINE BESYLATE 5 MILLIGRAM(S): 2.5 TABLET ORAL at 05:04

## 2018-10-06 RX ADMIN — Medication 200 MILLIGRAM(S): at 13:50

## 2018-10-06 RX ADMIN — MORPHINE SULFATE 4 MILLIGRAM(S): 50 CAPSULE, EXTENDED RELEASE ORAL at 05:25

## 2018-10-06 RX ADMIN — HEPARIN SODIUM 5000 UNIT(S): 5000 INJECTION INTRAVENOUS; SUBCUTANEOUS at 05:03

## 2018-10-06 RX ADMIN — HYDROMORPHONE HYDROCHLORIDE 1 MILLIGRAM(S): 2 INJECTION INTRAMUSCULAR; INTRAVENOUS; SUBCUTANEOUS at 14:50

## 2018-10-06 RX ADMIN — MIRTAZAPINE 7.5 MILLIGRAM(S): 45 TABLET, ORALLY DISINTEGRATING ORAL at 21:28

## 2018-10-06 RX ADMIN — Medication 100 MILLIGRAM(S): at 05:04

## 2018-10-06 RX ADMIN — HYDROMORPHONE HYDROCHLORIDE 1 MILLIGRAM(S): 2 INJECTION INTRAMUSCULAR; INTRAVENOUS; SUBCUTANEOUS at 21:53

## 2018-10-06 RX ADMIN — MORPHINE SULFATE 2 MILLIGRAM(S): 50 CAPSULE, EXTENDED RELEASE ORAL at 12:45

## 2018-10-06 RX ADMIN — Medication 100 MILLIGRAM(S): at 13:51

## 2018-10-06 RX ADMIN — TRAMADOL HYDROCHLORIDE 100 MILLIGRAM(S): 50 TABLET ORAL at 10:55

## 2018-10-06 RX ADMIN — LACTULOSE 15 GRAM(S): 10 SOLUTION ORAL at 21:28

## 2018-10-06 RX ADMIN — HYDROMORPHONE HYDROCHLORIDE 1 MILLIGRAM(S): 2 INJECTION INTRAMUSCULAR; INTRAVENOUS; SUBCUTANEOUS at 17:55

## 2018-10-06 RX ADMIN — MORPHINE SULFATE 4 MILLIGRAM(S): 50 CAPSULE, EXTENDED RELEASE ORAL at 05:10

## 2018-10-06 RX ADMIN — Medication 5 MILLIGRAM(S): at 21:28

## 2018-10-06 NOTE — PROGRESS NOTE ADULT - SUBJECTIVE AND OBJECTIVE BOX
Pt seen and examined at bedside. Reports severe right hip pain with movement. Had a BM yesterday.     VITAL SIGNS (Last 24 hrs):  T(C): 36.1 (10-06-18 @ 05:07), Max: 38.1 (10-05-18 @ 15:57)  HR: 77 (10-06-18 @ 05:07) (72 - 88)  BP: 176/86 (10-06-18 @ 05:07) (132/73 - 180/80)  RR: 16 (10-06-18 @ 05:07) (16 - 19)  SpO2: 96% (10-05-18 @ 18:04) (94% - 96%)  Wt(kg): --  Daily Height in cm: 154.94 (05 Oct 2018 19:15)    Daily     I&O's Summary      PHYSICAL EXAM:  GENERAL: NAD, frail   HEAD:  Atraumatic, Normocephalic  EYES: EOMI, PERRLA, conjunctiva and sclera clear  NECK: Supple, No JVD  CHEST/LUNG: Clear to auscultation bilaterally; No wheeze  HEART: Regular rate and rhythm; No murmurs, rubs, or gallops  ABDOMEN: Soft, Nontender, Nondistended; Bowel sounds present  EXTREMITIES:  right hip pain with ROM   PSYCH: AAOx3  NEUROLOGY: limited due to pain   SKIN: No rashes or lesions    Labs Reviewed  Spoke to patient in regards to abnormal labs.    CBC Full  -  ( 06 Oct 2018 06:48 )  WBC Count : 10.59 K/uL  Hemoglobin : 12.6 g/dL  Hematocrit : 39.1 %  Platelet Count - Automated : 269 K/uL  Mean Cell Volume : 91.6 fL  Mean Cell Hemoglobin : 29.5 pg  Mean Cell Hemoglobin Concentration : 32.2 g/dL  Auto Neutrophil # : x  Auto Lymphocyte # : x  Auto Monocyte # : x  Auto Eosinophil # : x  Auto Basophil # : x  Auto Neutrophil % : x  Auto Lymphocyte % : x  Auto Monocyte % : x  Auto Eosinophil % : x  Auto Basophil % : x    BMP:    10-06 @ 06:48    Blood Urea Nitrogen - 23  Calcium - 8.7  Carbond Dioxide - 21  Chloride - 102  Creatinine - 1.5  Glucose - 89  Potassium - 4.4  Sodium - 140     MEDICATIONS  (STANDING):  amLODIPine   Tablet 5 milliGRAM(s) Oral daily  bisacodyl 5 milliGRAM(s) Oral at bedtime  clopidogrel Tablet 75 milliGRAM(s) Oral daily  docusate sodium 100 milliGRAM(s) Oral three times a day  heparin  Injectable 5000 Unit(s) SubCutaneous every 12 hours  lactulose Syrup 15 Gram(s) Oral three times a day  mirtazapine 7.5 milliGRAM(s) Oral at bedtime  polyethylene glycol 3350 17 Gram(s) Oral daily  senna 2 Tablet(s) Oral at bedtime    MEDICATIONS  (PRN):  melatonin 3 milliGRAM(s) Oral at bedtime PRN Insomnia  morphine  - Injectable 2 milliGRAM(s) IV Push every 4 hours PRN Severe Pain (7 - 10)

## 2018-10-06 NOTE — PROGRESS NOTE ADULT - ASSESSMENT
82 F with pmhx of HTN, TIA, h/o AAA repair, polymyalgia rheumatica, with recent acute nondisplaced right inferior femoral neck fracture, and right superior acetabular stress fracture s/p R CRPP presenting for evaluation s/p mechanical fall last night. Patient states she had extensive physical therapy session at home and was exhausted. She was walking with walker tripped and fell onto her left side now c/o pain to right hip/RLE. No head injury or LOC. No cp, sob, fever, abdominal pain, nausea, vomiting, diarrhea, back pain, urinary symptoms, headache, dizziness, paresthesias, or weakness.      Problem List:  #Nondisplaced incomplete perihardware fracture involving the   intertrochanteric region   #)DANIEL on CKD III   #HTN  #PMR  #AAA s/p repair   #Constipation     Plan:  - PT/rehab   - pain control  - bowel regimen  - Orthopedic consult   - non-weight bearing for now   - c/w home meds  - dvt ppx

## 2018-10-06 NOTE — PROGRESS NOTE ADULT - SUBJECTIVE AND OBJECTIVE BOX
Spoke to son and patient concerning transfer to [facility] Spoke to son and patient concerning transfer to [facility], spoke to day hospitalist but will try to also speak to night hospitalist later

## 2018-10-07 RX ADMIN — SENNA PLUS 2 TABLET(S): 8.6 TABLET ORAL at 21:50

## 2018-10-07 RX ADMIN — MIRTAZAPINE 7.5 MILLIGRAM(S): 45 TABLET, ORALLY DISINTEGRATING ORAL at 21:51

## 2018-10-07 RX ADMIN — Medication 5 MILLIGRAM(S): at 21:50

## 2018-10-07 RX ADMIN — LACTULOSE 15 GRAM(S): 10 SOLUTION ORAL at 21:51

## 2018-10-07 RX ADMIN — Medication 100 MILLIGRAM(S): at 21:50

## 2018-10-07 RX ADMIN — HYDROMORPHONE HYDROCHLORIDE 1 MILLIGRAM(S): 2 INJECTION INTRAMUSCULAR; INTRAVENOUS; SUBCUTANEOUS at 19:01

## 2018-10-07 RX ADMIN — HYDROMORPHONE HYDROCHLORIDE 1 MILLIGRAM(S): 2 INJECTION INTRAMUSCULAR; INTRAVENOUS; SUBCUTANEOUS at 18:32

## 2018-10-07 RX ADMIN — Medication 200 MILLIGRAM(S): at 18:34

## 2018-10-07 RX ADMIN — CLOPIDOGREL BISULFATE 75 MILLIGRAM(S): 75 TABLET, FILM COATED ORAL at 13:32

## 2018-10-07 RX ADMIN — HEPARIN SODIUM 5000 UNIT(S): 5000 INJECTION INTRAVENOUS; SUBCUTANEOUS at 18:33

## 2018-10-07 RX ADMIN — HEPARIN SODIUM 5000 UNIT(S): 5000 INJECTION INTRAVENOUS; SUBCUTANEOUS at 05:46

## 2018-10-07 RX ADMIN — HYDROMORPHONE HYDROCHLORIDE 1 MILLIGRAM(S): 2 INJECTION INTRAMUSCULAR; INTRAVENOUS; SUBCUTANEOUS at 09:52

## 2018-10-07 RX ADMIN — Medication 200 MILLIGRAM(S): at 05:45

## 2018-10-07 RX ADMIN — Medication 100 MILLIGRAM(S): at 05:44

## 2018-10-07 RX ADMIN — HYDROMORPHONE HYDROCHLORIDE 1 MILLIGRAM(S): 2 INJECTION INTRAMUSCULAR; INTRAVENOUS; SUBCUTANEOUS at 05:47

## 2018-10-07 RX ADMIN — AMLODIPINE BESYLATE 5 MILLIGRAM(S): 2.5 TABLET ORAL at 05:45

## 2018-10-08 LAB
BASOPHILS # BLD AUTO: 0.01 K/UL — SIGNIFICANT CHANGE UP (ref 0–0.2)
BASOPHILS NFR BLD AUTO: 0.1 % — SIGNIFICANT CHANGE UP (ref 0–1)
EOSINOPHIL # BLD AUTO: 0.01 K/UL — SIGNIFICANT CHANGE UP (ref 0–0.7)
EOSINOPHIL NFR BLD AUTO: 0.1 % — SIGNIFICANT CHANGE UP (ref 0–8)
HCT VFR BLD CALC: 39.7 % — SIGNIFICANT CHANGE UP (ref 37–47)
HGB BLD-MCNC: 13 G/DL — SIGNIFICANT CHANGE UP (ref 12–16)
IMM GRANULOCYTES NFR BLD AUTO: 0.5 % — HIGH (ref 0.1–0.3)
LYMPHOCYTES # BLD AUTO: 0.86 K/UL — LOW (ref 1.2–3.4)
LYMPHOCYTES # BLD AUTO: 6.8 % — LOW (ref 20.5–51.1)
MCHC RBC-ENTMCNC: 29.1 PG — SIGNIFICANT CHANGE UP (ref 27–31)
MCHC RBC-ENTMCNC: 32.7 G/DL — SIGNIFICANT CHANGE UP (ref 32–37)
MCV RBC AUTO: 89 FL — SIGNIFICANT CHANGE UP (ref 81–99)
MONOCYTES # BLD AUTO: 0.28 K/UL — SIGNIFICANT CHANGE UP (ref 0.1–0.6)
MONOCYTES NFR BLD AUTO: 2.2 % — SIGNIFICANT CHANGE UP (ref 1.7–9.3)
NEUTROPHILS # BLD AUTO: 11.34 K/UL — HIGH (ref 1.4–6.5)
NEUTROPHILS NFR BLD AUTO: 90.3 % — HIGH (ref 42.2–75.2)
NRBC # BLD: 0 /100 WBCS — SIGNIFICANT CHANGE UP (ref 0–0)
PLATELET # BLD AUTO: 329 K/UL — SIGNIFICANT CHANGE UP (ref 130–400)
RBC # BLD: 4.46 M/UL — SIGNIFICANT CHANGE UP (ref 4.2–5.4)
RBC # FLD: 13.4 % — SIGNIFICANT CHANGE UP (ref 11.5–14.5)
WBC # BLD: 12.56 K/UL — HIGH (ref 4.8–10.8)
WBC # FLD AUTO: 12.56 K/UL — HIGH (ref 4.8–10.8)

## 2018-10-08 RX ORDER — DOCUSATE SODIUM 100 MG
100 CAPSULE ORAL THREE TIMES A DAY
Qty: 0 | Refills: 0 | Status: DISCONTINUED | OUTPATIENT
Start: 2018-10-08 | End: 2018-10-09

## 2018-10-08 RX ORDER — LACTULOSE 10 G/15ML
15 SOLUTION ORAL THREE TIMES A DAY
Qty: 0 | Refills: 0 | Status: DISCONTINUED | OUTPATIENT
Start: 2018-10-08 | End: 2018-10-09

## 2018-10-08 RX ORDER — CLOPIDOGREL BISULFATE 75 MG/1
75 TABLET, FILM COATED ORAL DAILY
Qty: 0 | Refills: 0 | Status: DISCONTINUED | OUTPATIENT
Start: 2018-10-08 | End: 2018-10-09

## 2018-10-08 RX ORDER — ONDANSETRON 8 MG/1
4 TABLET, FILM COATED ORAL EVERY 4 HOURS
Qty: 0 | Refills: 0 | Status: DISCONTINUED | OUTPATIENT
Start: 2018-10-08 | End: 2018-10-09

## 2018-10-08 RX ORDER — HEPARIN SODIUM 5000 [USP'U]/ML
5000 INJECTION INTRAVENOUS; SUBCUTANEOUS EVERY 12 HOURS
Qty: 0 | Refills: 0 | Status: DISCONTINUED | OUTPATIENT
Start: 2018-10-08 | End: 2018-10-09

## 2018-10-08 RX ORDER — SENNA PLUS 8.6 MG/1
2 TABLET ORAL AT BEDTIME
Qty: 0 | Refills: 0 | Status: DISCONTINUED | OUTPATIENT
Start: 2018-10-08 | End: 2018-10-09

## 2018-10-08 RX ORDER — MIRTAZAPINE 45 MG/1
7.5 TABLET, ORALLY DISINTEGRATING ORAL AT BEDTIME
Qty: 0 | Refills: 0 | Status: DISCONTINUED | OUTPATIENT
Start: 2018-10-08 | End: 2018-10-09

## 2018-10-08 RX ORDER — LABETALOL HCL 100 MG
200 TABLET ORAL EVERY 12 HOURS
Qty: 0 | Refills: 0 | Status: DISCONTINUED | OUTPATIENT
Start: 2018-10-08 | End: 2018-10-09

## 2018-10-08 RX ORDER — POLYETHYLENE GLYCOL 3350 17 G/17G
17 POWDER, FOR SOLUTION ORAL DAILY
Qty: 0 | Refills: 0 | Status: DISCONTINUED | OUTPATIENT
Start: 2018-10-08 | End: 2018-10-09

## 2018-10-08 RX ORDER — LANOLIN ALCOHOL/MO/W.PET/CERES
3 CREAM (GRAM) TOPICAL AT BEDTIME
Qty: 0 | Refills: 0 | Status: DISCONTINUED | OUTPATIENT
Start: 2018-10-08 | End: 2018-10-09

## 2018-10-08 RX ORDER — AMLODIPINE BESYLATE 2.5 MG/1
5 TABLET ORAL DAILY
Qty: 0 | Refills: 0 | Status: DISCONTINUED | OUTPATIENT
Start: 2018-10-08 | End: 2018-10-09

## 2018-10-08 RX ADMIN — Medication 100 MILLIGRAM(S): at 05:01

## 2018-10-08 RX ADMIN — LACTULOSE 15 GRAM(S): 10 SOLUTION ORAL at 21:15

## 2018-10-08 RX ADMIN — HYDROMORPHONE HYDROCHLORIDE 1 MILLIGRAM(S): 2 INJECTION INTRAMUSCULAR; INTRAVENOUS; SUBCUTANEOUS at 05:54

## 2018-10-08 RX ADMIN — HEPARIN SODIUM 5000 UNIT(S): 5000 INJECTION INTRAVENOUS; SUBCUTANEOUS at 05:02

## 2018-10-08 RX ADMIN — Medication 200 MILLIGRAM(S): at 05:01

## 2018-10-08 RX ADMIN — AMLODIPINE BESYLATE 5 MILLIGRAM(S): 2.5 TABLET ORAL at 05:00

## 2018-10-08 RX ADMIN — SENNA PLUS 2 TABLET(S): 8.6 TABLET ORAL at 21:14

## 2018-10-08 RX ADMIN — HYDROMORPHONE HYDROCHLORIDE 1 MILLIGRAM(S): 2 INJECTION INTRAMUSCULAR; INTRAVENOUS; SUBCUTANEOUS at 05:00

## 2018-10-08 RX ADMIN — Medication 200 MILLIGRAM(S): at 18:27

## 2018-10-08 RX ADMIN — Medication 5 MILLIGRAM(S): at 22:35

## 2018-10-08 RX ADMIN — MIRTAZAPINE 7.5 MILLIGRAM(S): 45 TABLET, ORALLY DISINTEGRATING ORAL at 21:16

## 2018-10-08 RX ADMIN — Medication 100 MILLIGRAM(S): at 21:15

## 2018-10-08 RX ADMIN — LACTULOSE 15 GRAM(S): 10 SOLUTION ORAL at 05:02

## 2018-10-08 RX ADMIN — HEPARIN SODIUM 5000 UNIT(S): 5000 INJECTION INTRAVENOUS; SUBCUTANEOUS at 18:27

## 2018-10-08 NOTE — PROGRESS NOTE ADULT - SUBJECTIVE AND OBJECTIVE BOX
DR PATTON DISCUSSED SURGICAL OPTIONS WITH  THE PT AND FAMILY   THE FAMILY IS IN AGREEMENT  FOR REVISION SURGERY ON WEDNESDAY   MEDICAL CLEARANCE WILL BE REQUIRED DR PATTON DISCUSSED SURGICAL OPTIONS WITH  THE PT AND FAMILY   THE FAMILY IS IN AGREEMENT  FOR REVISION SURGERY ON TUESDAY  MEDICAL CLEARANCE WILL BE REQUIRED

## 2018-10-09 LAB
ANION GAP SERPL CALC-SCNC: 20 MMOL/L — HIGH (ref 7–14)
APPEARANCE UR: ABNORMAL
BACTERIA # UR AUTO: ABNORMAL /HPF
BILIRUB UR-MCNC: NEGATIVE — SIGNIFICANT CHANGE UP
BUN SERPL-MCNC: 31 MG/DL — HIGH (ref 10–20)
CALCIUM SERPL-MCNC: 9.5 MG/DL — SIGNIFICANT CHANGE UP (ref 8.5–10.1)
CHLORIDE SERPL-SCNC: 101 MMOL/L — SIGNIFICANT CHANGE UP (ref 98–110)
CO2 SERPL-SCNC: 19 MMOL/L — SIGNIFICANT CHANGE UP (ref 17–32)
COLOR SPEC: SIGNIFICANT CHANGE UP
CREAT SERPL-MCNC: 1.4 MG/DL — SIGNIFICANT CHANGE UP (ref 0.7–1.5)
DIFF PNL FLD: ABNORMAL
GLUCOSE SERPL-MCNC: 93 MG/DL — SIGNIFICANT CHANGE UP (ref 70–99)
GLUCOSE UR QL: NEGATIVE MG/DL — SIGNIFICANT CHANGE UP
KETONES UR-MCNC: 15
LEUKOCYTE ESTERASE UR-ACNC: ABNORMAL
NITRITE UR-MCNC: POSITIVE
PH UR: 6 — SIGNIFICANT CHANGE UP (ref 5–8)
POTASSIUM SERPL-MCNC: 4.6 MMOL/L — SIGNIFICANT CHANGE UP (ref 3.5–5)
POTASSIUM SERPL-SCNC: 4.6 MMOL/L — SIGNIFICANT CHANGE UP (ref 3.5–5)
PROT UR-MCNC: 100 MG/DL
RBC CASTS # UR COMP ASSIST: ABNORMAL /HPF
SODIUM SERPL-SCNC: 140 MMOL/L — SIGNIFICANT CHANGE UP (ref 135–146)
SP GR SPEC: 1.02 — SIGNIFICANT CHANGE UP (ref 1.01–1.03)
UROBILINOGEN FLD QL: 1 MG/DL (ref 0.2–0.2)
WBC UR QL: >50 /HPF

## 2018-10-09 RX ORDER — LANOLIN ALCOHOL/MO/W.PET/CERES
3 CREAM (GRAM) TOPICAL AT BEDTIME
Qty: 0 | Refills: 0 | Status: DISCONTINUED | OUTPATIENT
Start: 2018-10-09 | End: 2018-10-16

## 2018-10-09 RX ORDER — CEFTRIAXONE 500 MG/1
1 INJECTION, POWDER, FOR SOLUTION INTRAMUSCULAR; INTRAVENOUS ONCE
Qty: 0 | Refills: 0 | Status: COMPLETED | OUTPATIENT
Start: 2018-10-09 | End: 2018-10-09

## 2018-10-09 RX ORDER — LABETALOL HCL 100 MG
200 TABLET ORAL EVERY 12 HOURS
Qty: 0 | Refills: 0 | Status: DISCONTINUED | OUTPATIENT
Start: 2018-10-09 | End: 2018-10-16

## 2018-10-09 RX ORDER — HEPARIN SODIUM 5000 [USP'U]/ML
5000 INJECTION INTRAVENOUS; SUBCUTANEOUS EVERY 12 HOURS
Qty: 0 | Refills: 0 | Status: DISCONTINUED | OUTPATIENT
Start: 2018-10-09 | End: 2018-10-16

## 2018-10-09 RX ORDER — OXYCODONE HYDROCHLORIDE 5 MG/1
10 TABLET ORAL EVERY 4 HOURS
Qty: 0 | Refills: 0 | Status: DISCONTINUED | OUTPATIENT
Start: 2018-10-09 | End: 2018-10-10

## 2018-10-09 RX ORDER — ONDANSETRON 8 MG/1
4 TABLET, FILM COATED ORAL ONCE
Qty: 0 | Refills: 0 | Status: DISCONTINUED | OUTPATIENT
Start: 2018-10-09 | End: 2018-10-10

## 2018-10-09 RX ORDER — DOCUSATE SODIUM 100 MG
100 CAPSULE ORAL THREE TIMES A DAY
Qty: 0 | Refills: 0 | Status: DISCONTINUED | OUTPATIENT
Start: 2018-10-09 | End: 2018-10-16

## 2018-10-09 RX ORDER — ONDANSETRON 8 MG/1
4 TABLET, FILM COATED ORAL EVERY 4 HOURS
Qty: 0 | Refills: 0 | Status: DISCONTINUED | OUTPATIENT
Start: 2018-10-09 | End: 2018-10-16

## 2018-10-09 RX ORDER — MORPHINE SULFATE 50 MG/1
2 CAPSULE, EXTENDED RELEASE ORAL EVERY 4 HOURS
Qty: 0 | Refills: 0 | Status: DISCONTINUED | OUTPATIENT
Start: 2018-10-09 | End: 2018-10-09

## 2018-10-09 RX ORDER — MIRTAZAPINE 45 MG/1
7.5 TABLET, ORALLY DISINTEGRATING ORAL AT BEDTIME
Qty: 0 | Refills: 0 | Status: DISCONTINUED | OUTPATIENT
Start: 2018-10-09 | End: 2018-10-16

## 2018-10-09 RX ORDER — CEFTRIAXONE 500 MG/1
1 INJECTION, POWDER, FOR SOLUTION INTRAMUSCULAR; INTRAVENOUS EVERY 24 HOURS
Qty: 0 | Refills: 0 | Status: DISCONTINUED | OUTPATIENT
Start: 2018-10-09 | End: 2018-10-09

## 2018-10-09 RX ORDER — AMLODIPINE BESYLATE 2.5 MG/1
5 TABLET ORAL DAILY
Qty: 0 | Refills: 0 | Status: DISCONTINUED | OUTPATIENT
Start: 2018-10-09 | End: 2018-10-16

## 2018-10-09 RX ORDER — OXYCODONE HYDROCHLORIDE 5 MG/1
5 TABLET ORAL EVERY 4 HOURS
Qty: 0 | Refills: 0 | Status: DISCONTINUED | OUTPATIENT
Start: 2018-10-09 | End: 2018-10-09

## 2018-10-09 RX ORDER — MORPHINE SULFATE 50 MG/1
2 CAPSULE, EXTENDED RELEASE ORAL
Qty: 0 | Refills: 0 | Status: DISCONTINUED | OUTPATIENT
Start: 2018-10-09 | End: 2018-10-10

## 2018-10-09 RX ORDER — SODIUM CHLORIDE 9 MG/ML
1000 INJECTION, SOLUTION INTRAVENOUS
Qty: 0 | Refills: 0 | Status: DISCONTINUED | OUTPATIENT
Start: 2018-10-09 | End: 2018-10-10

## 2018-10-09 RX ORDER — CEFTRIAXONE 500 MG/1
1 INJECTION, POWDER, FOR SOLUTION INTRAMUSCULAR; INTRAVENOUS EVERY 24 HOURS
Qty: 0 | Refills: 0 | Status: COMPLETED | OUTPATIENT
Start: 2018-10-09 | End: 2018-10-12

## 2018-10-09 RX ORDER — SENNA PLUS 8.6 MG/1
2 TABLET ORAL AT BEDTIME
Qty: 0 | Refills: 0 | Status: DISCONTINUED | OUTPATIENT
Start: 2018-10-09 | End: 2018-10-16

## 2018-10-09 RX ORDER — CLOPIDOGREL BISULFATE 75 MG/1
75 TABLET, FILM COATED ORAL DAILY
Qty: 0 | Refills: 0 | Status: DISCONTINUED | OUTPATIENT
Start: 2018-10-09 | End: 2018-10-16

## 2018-10-09 RX ADMIN — AMLODIPINE BESYLATE 5 MILLIGRAM(S): 2.5 TABLET ORAL at 05:26

## 2018-10-09 RX ADMIN — Medication 200 MILLIGRAM(S): at 05:26

## 2018-10-09 RX ADMIN — SODIUM CHLORIDE 80 MILLILITER(S): 9 INJECTION, SOLUTION INTRAVENOUS at 22:21

## 2018-10-09 RX ADMIN — HEPARIN SODIUM 5000 UNIT(S): 5000 INJECTION INTRAVENOUS; SUBCUTANEOUS at 17:19

## 2018-10-09 RX ADMIN — HEPARIN SODIUM 5000 UNIT(S): 5000 INJECTION INTRAVENOUS; SUBCUTANEOUS at 05:27

## 2018-10-09 RX ADMIN — MORPHINE SULFATE 2 MILLIGRAM(S): 50 CAPSULE, EXTENDED RELEASE ORAL at 23:10

## 2018-10-09 RX ADMIN — CEFTRIAXONE 100 GRAM(S): 500 INJECTION, POWDER, FOR SOLUTION INTRAMUSCULAR; INTRAVENOUS at 05:40

## 2018-10-09 RX ADMIN — Medication 100 MILLIGRAM(S): at 05:26

## 2018-10-09 RX ADMIN — Medication 200 MILLIGRAM(S): at 17:19

## 2018-10-09 RX ADMIN — LACTULOSE 15 GRAM(S): 10 SOLUTION ORAL at 05:27

## 2018-10-09 NOTE — CHART NOTE - NSCHARTNOTEFT_GEN_A_CORE
PACU ANESTHESIA ADMISSION NOTE      Procedure: Removal of hardware from hip  Intramedullary nailing of hip    Post op diagnosis:  Closed fracture of right hip, initial encounter      ____  Intubated  TV:______       Rate: ______      FiO2: ______    ___x_  Patent Airway    ____  Full return of protective reflexes    ____  Full recovery from anesthesia / back to baseline     Vitals:   T:   97.6        R:   20               BP:    119/67              Sat:    95%               P: 73      Mental Status:  _x___ Awake   _____ Alert   _____ Drowsy   _____ Sedated    Nausea/Vomiting:  _x___ NO  ______Yes,   See Post - Op Orders          Pain Scale (0-10):  _____    Treatment: __x__ None    ____ See Post - Op/PCA Orders    Post - Operative Fluids:   ____ Oral   __x__ See Post - Op Orders    Plan: Discharge:   ____Home       _x____Floor     _____Critical Care    _____  Other:_________________    Comments:

## 2018-10-09 NOTE — PROGRESS NOTE ADULT - SUBJECTIVE AND OBJECTIVE BOX
SUBJECTIVE:    Patient is a 83y old Female who presents with a chief complaint of right hip periprosthetic fracture (06 Oct 2018 17:33)    Currently admitted to medicine with the primary diagnosis of Intertrochanteric fracture of right hip     Today is hospital day 2d. Going to OR today for revision surgery. Positive u/a - received rocephinx1 dose overnight.     PAST MEDICAL & SURGICAL HISTORY  Hip fracture  Smoker  Polymyalgia rheumatica  Abdominal aortic aneurysm (AAA) without rupture  Essential hypertension  Falls  TIA (transient ischemic attack)  H/O abdominal aortic aneurysm repair    SOCIAL HISTORY:  Negative for smoking/alcohol/drug use.     ALLERGIES:  Fosamax (Other (Moderate))  ibuprofen (Flushing)  Novacet (Rash)  penicillin (Rash)  Sulfac 10% (Rash)  Tylenol (Pruritus)    MEDICATIONS:  STANDING MEDICATIONS  amLODIPine   Tablet 5 milliGRAM(s) Oral daily  bisacodyl 5 milliGRAM(s) Oral at bedtime  clopidogrel Tablet 75 milliGRAM(s) Oral daily  docusate sodium 100 milliGRAM(s) Oral three times a day  heparin  Injectable 5000 Unit(s) SubCutaneous every 12 hours  labetalol 200 milliGRAM(s) Oral every 12 hours  lactulose Syrup 15 Gram(s) Oral three times a day  mirtazapine 7.5 milliGRAM(s) Oral at bedtime  polyethylene glycol 3350 17 Gram(s) Oral daily  senna 2 Tablet(s) Oral at bedtime    PRN MEDICATIONS  melatonin 3 milliGRAM(s) Oral at bedtime PRN  ondansetron Injectable 4 milliGRAM(s) IV Push every 4 hours PRN    VITALS:   T(F): 97.3  HR: 61  BP: 118/56  RR: 18  SpO2: 98%    LABS:                        13.0   12.56 )-----------( 329      ( 08 Oct 2018 22:31 )             39.7     10-08    140  |  101  |  31<H>  ----------------------------<  93  4.6   |  19  |  1.4    Ca    9.5      08 Oct 2018 22:31      PT/INR - ( 08 Oct 2018 13:10 )   PT: 11.30 sec;   INR: 1.05 ratio         PTT - ( 08 Oct 2018 13:10 )  PTT:26.7 sec  Urinalysis Basic - ( 09 Oct 2018 02:30 )    Color: Dark Yellow / Appearance: Turbid / S.020 / pH: x  Gluc: x / Ketone: 15  / Bili: Negative / Urobili: 1.0 mg/dL   Blood: x / Protein: 100 mg/dL / Nitrite: Positive   Leuk Esterase: Large / RBC: 2-5 /HPF / WBC >50 /HPF   Sq Epi: x / Non Sq Epi: x / Bacteria: Many /HPF                RADIOLOGY:    PHYSICAL EXAM:  GEN: No acute distress  LUNGS: Clear to auscultation bilaterally   HEART: S1/S2 present. RRR.   ABD: Soft, non-tender, non-distended. Bowel sounds present  EXT: NC/NC/NE/2+PP/FELICIANO/Skin Intact.   NEURO: AAOX1 to person SUBJECTIVE:    Patient is a 83y old Female who presents with a chief complaint of right hip periprosthetic fracture (06 Oct 2018 17:33)    Currently admitted to medicine with the primary diagnosis of Intertrochanteric fracture of right hip     Today is hospital day 2d. Going to OR today for revision surgery. Positive u/a - received rocephinx1 dose overnight.   R hip pain, nonradiating, sharp, worsen with movement  no cp, sob, abd pain, fever    PAST MEDICAL & SURGICAL HISTORY  Hip fracture  Smoker  Polymyalgia rheumatica  Abdominal aortic aneurysm (AAA) without rupture  Essential hypertension  Falls  TIA (transient ischemic attack)  H/O abdominal aortic aneurysm repair    SOCIAL HISTORY:  Negative for smoking/alcohol/drug use.     ALLERGIES:  Fosamax (Other (Moderate))  ibuprofen (Flushing)  Novacet (Rash)  penicillin (Rash)  Sulfac 10% (Rash)  Tylenol (Pruritus)    MEDICATIONS:  STANDING MEDICATIONS  amLODIPine   Tablet 5 milliGRAM(s) Oral daily  bisacodyl 5 milliGRAM(s) Oral at bedtime  clopidogrel Tablet 75 milliGRAM(s) Oral daily  docusate sodium 100 milliGRAM(s) Oral three times a day  heparin  Injectable 5000 Unit(s) SubCutaneous every 12 hours  labetalol 200 milliGRAM(s) Oral every 12 hours  lactulose Syrup 15 Gram(s) Oral three times a day  mirtazapine 7.5 milliGRAM(s) Oral at bedtime  polyethylene glycol 3350 17 Gram(s) Oral daily  senna 2 Tablet(s) Oral at bedtime    PRN MEDICATIONS  melatonin 3 milliGRAM(s) Oral at bedtime PRN  ondansetron Injectable 4 milliGRAM(s) IV Push every 4 hours PRN    VITALS:   T(F): 97.3  HR: 61  BP: 118/56  RR: 18  SpO2: 98%    LABS:                        13.0   12.56 )-----------( 329      ( 08 Oct 2018 22:31 )             39.7     10-08    140  |  101  |  31<H>  ----------------------------<  93  4.6   |  19  |  1.4    Ca    9.5      08 Oct 2018 22:31      PT/INR - ( 08 Oct 2018 13:10 )   PT: 11.30 sec;   INR: 1.05 ratio         PTT - ( 08 Oct 2018 13:10 )  PTT:26.7 sec  Urinalysis Basic - ( 09 Oct 2018 02:30 )    Color: Dark Yellow / Appearance: Turbid / S.020 / pH: x  Gluc: x / Ketone: 15  / Bili: Negative / Urobili: 1.0 mg/dL   Blood: x / Protein: 100 mg/dL / Nitrite: Positive   Leuk Esterase: Large / RBC: 2-5 /HPF / WBC >50 /HPF   Sq Epi: x / Non Sq Epi: x / Bacteria: Many /HPF                RADIOLOGY:    PHYSICAL EXAM:  GEN: No acute distress  LUNGS: Clear to auscultation bilaterally   HEART: S1/S2 present. RRR.   ABD: Soft, non-tender, non-distended. Bowel sounds present  EXT: NC/NC/NE/2+PP/FELICIANO/Skin Intact.   NEURO: AAOX1 to person

## 2018-10-09 NOTE — BRIEF OPERATIVE NOTE - PROCEDURE
<<-----Click on this checkbox to enter Procedure Intramedullary nailing of hip  10/09/2018    Active  [email or web address]  Removal of hardware from hip  10/09/2018    Active  [email or web address]

## 2018-10-09 NOTE — PROGRESS NOTE ADULT - SUBJECTIVE AND OBJECTIVE BOX
ORTHO POST OP NOTE    Procedure: Removal of hardware from hip  Intramedullary nailing of hip      Patient seen and examined at bedside. No acute events since OR. Resting without complaints. Pain controlled with medication. Denies chest pain, SOB, N/V.    T(C): 36.8 (10-09-18 @ 23:20), Max: 36.8 (10-08-18 @ 23:58)  HR: 64 (10-09-18 @ 23:20) (57 - 72)  BP: 132/65 (10-09-18 @ 23:05) (118/56 - 167/75)  RR: 20 (10-09-18 @ 23:20) (18 - 20)  SpO2: 96% (10-09-18 @ 23:20) (94% - 98%)  Wt(kg): --    Exam:  Alert and [place], No Acute Distress    RLE  Dressing  C/D/I  Compartments soft/compressible  Calves soft  EHL/FHL Motor intact  SILT distally  Ext wwp                           13.0   12.56<H> )-----------( 329      ( 08 Oct 2018 22:31 )             39.7      10-08    140  |  101  |  31<H>  ----------------------------<  93  4.6   |  19  |  1.4      PT/INR - ( 08 Oct 2018 13:10 )   PT: 11.30 sec;   INR: 1.05 ratio         PTT - ( 08 Oct 2018 13:10 )  PTT:26.7 sec      A/P: 83yF S/p R Removal of hardware from hip, Intramedullary nailing of hip      -Periop antibiotics  -PT/OT  -WBAT  -OOBTC  -F/U AM Labs  -DVT PPx  -Pain Control  -SCDs  -IS  -Continue Current Tx  -Dispo planning

## 2018-10-10 LAB
ANION GAP SERPL CALC-SCNC: 20 MMOL/L — HIGH (ref 7–14)
BASOPHILS # BLD AUTO: 0.02 K/UL — SIGNIFICANT CHANGE UP (ref 0–0.2)
BASOPHILS NFR BLD AUTO: 0.2 % — SIGNIFICANT CHANGE UP (ref 0–1)
BUN SERPL-MCNC: 38 MG/DL — HIGH (ref 10–20)
CALCIUM SERPL-MCNC: 9 MG/DL — SIGNIFICANT CHANGE UP (ref 8.5–10.1)
CHLORIDE SERPL-SCNC: 104 MMOL/L — SIGNIFICANT CHANGE UP (ref 98–110)
CO2 SERPL-SCNC: 19 MMOL/L — SIGNIFICANT CHANGE UP (ref 17–32)
CREAT SERPL-MCNC: 1.7 MG/DL — HIGH (ref 0.7–1.5)
EOSINOPHIL # BLD AUTO: 0.03 K/UL — SIGNIFICANT CHANGE UP (ref 0–0.7)
EOSINOPHIL NFR BLD AUTO: 0.2 % — SIGNIFICANT CHANGE UP (ref 0–8)
GLUCOSE SERPL-MCNC: 76 MG/DL — SIGNIFICANT CHANGE UP (ref 70–99)
HCT VFR BLD CALC: 36.6 % — LOW (ref 37–47)
HGB BLD-MCNC: 11.7 G/DL — LOW (ref 12–16)
IMM GRANULOCYTES NFR BLD AUTO: 0.5 % — HIGH (ref 0.1–0.3)
LYMPHOCYTES # BLD AUTO: 0.79 K/UL — LOW (ref 1.2–3.4)
LYMPHOCYTES # BLD AUTO: 6.4 % — LOW (ref 20.5–51.1)
MCHC RBC-ENTMCNC: 29.2 PG — SIGNIFICANT CHANGE UP (ref 27–31)
MCHC RBC-ENTMCNC: 32 G/DL — SIGNIFICANT CHANGE UP (ref 32–37)
MCV RBC AUTO: 91.3 FL — SIGNIFICANT CHANGE UP (ref 81–99)
MONOCYTES # BLD AUTO: 0.73 K/UL — HIGH (ref 0.1–0.6)
MONOCYTES NFR BLD AUTO: 5.9 % — SIGNIFICANT CHANGE UP (ref 1.7–9.3)
NEUTROPHILS # BLD AUTO: 10.69 K/UL — HIGH (ref 1.4–6.5)
NEUTROPHILS NFR BLD AUTO: 86.8 % — HIGH (ref 42.2–75.2)
NRBC # BLD: 0 /100 WBCS — SIGNIFICANT CHANGE UP (ref 0–0)
PLATELET # BLD AUTO: 264 K/UL — SIGNIFICANT CHANGE UP (ref 130–400)
POTASSIUM SERPL-MCNC: 4.9 MMOL/L — SIGNIFICANT CHANGE UP (ref 3.5–5)
POTASSIUM SERPL-SCNC: 4.9 MMOL/L — SIGNIFICANT CHANGE UP (ref 3.5–5)
RBC # BLD: 4.01 M/UL — LOW (ref 4.2–5.4)
RBC # FLD: 13.7 % — SIGNIFICANT CHANGE UP (ref 11.5–14.5)
SODIUM SERPL-SCNC: 143 MMOL/L — SIGNIFICANT CHANGE UP (ref 135–146)
WBC # BLD: 12.32 K/UL — HIGH (ref 4.8–10.8)
WBC # FLD AUTO: 12.32 K/UL — HIGH (ref 4.8–10.8)

## 2018-10-10 RX ADMIN — HEPARIN SODIUM 5000 UNIT(S): 5000 INJECTION INTRAVENOUS; SUBCUTANEOUS at 05:29

## 2018-10-10 RX ADMIN — OXYCODONE HYDROCHLORIDE 10 MILLIGRAM(S): 5 TABLET ORAL at 19:21

## 2018-10-10 RX ADMIN — OXYCODONE HYDROCHLORIDE 10 MILLIGRAM(S): 5 TABLET ORAL at 20:20

## 2018-10-10 RX ADMIN — MIRTAZAPINE 7.5 MILLIGRAM(S): 45 TABLET, ORALLY DISINTEGRATING ORAL at 21:22

## 2018-10-10 RX ADMIN — CEFTRIAXONE 100 GRAM(S): 500 INJECTION, POWDER, FOR SOLUTION INTRAMUSCULAR; INTRAVENOUS at 05:28

## 2018-10-10 RX ADMIN — Medication 100 MILLIGRAM(S): at 14:03

## 2018-10-10 RX ADMIN — Medication 100 MILLIGRAM(S): at 05:29

## 2018-10-10 RX ADMIN — Medication 200 MILLIGRAM(S): at 17:27

## 2018-10-10 RX ADMIN — Medication 5 MILLIGRAM(S): at 21:21

## 2018-10-10 RX ADMIN — Medication 100 MILLIGRAM(S): at 21:21

## 2018-10-10 RX ADMIN — AMLODIPINE BESYLATE 5 MILLIGRAM(S): 2.5 TABLET ORAL at 05:29

## 2018-10-10 RX ADMIN — CLOPIDOGREL BISULFATE 75 MILLIGRAM(S): 75 TABLET, FILM COATED ORAL at 14:02

## 2018-10-10 RX ADMIN — Medication 100 MILLIGRAM(S): at 14:32

## 2018-10-10 RX ADMIN — SENNA PLUS 2 TABLET(S): 8.6 TABLET ORAL at 21:21

## 2018-10-10 RX ADMIN — Medication 200 MILLIGRAM(S): at 05:29

## 2018-10-10 RX ADMIN — HEPARIN SODIUM 5000 UNIT(S): 5000 INJECTION INTRAVENOUS; SUBCUTANEOUS at 17:27

## 2018-10-10 RX ADMIN — ONDANSETRON 4 MILLIGRAM(S): 8 TABLET, FILM COATED ORAL at 19:21

## 2018-10-10 NOTE — PHYSICAL THERAPY INITIAL EVALUATION ADULT - IMPAIRED TRANSFERS: BED/CHAIR, REHAB EVAL
impaired balance/decreased ROM/decreased strength/impaired postural control/impaired coordination/pain

## 2018-10-10 NOTE — OCCUPATIONAL THERAPY INITIAL EVALUATION ADULT - TRANSFER SAFETY CONCERNS NOTED: BED/CHAIR, REHAB EVAL
decreased safety awareness/decreased weight-shifting ability/decreased balance during turns/losing balance/decreased step length

## 2018-10-10 NOTE — PROGRESS NOTE ADULT - SUBJECTIVE AND OBJECTIVE BOX
SUBJECTIVE:    Patient is a 83y old Female who presents with a chief complaint of right hip periprosthetic fracture (06 Oct 2018 17:33)    Currently admitted to medicine with the primary diagnosis of Intertrochanteric fracture of right hip     Today is hospital day 2d. Pt found smoking yesterday (family gave her a cigarette). Went for OR procedure yesterday evening.   83 F s/p  Removal of hardware from right hip  Intramedullary nailing of right hip, pod1.     PAST MEDICAL & SURGICAL HISTORY  Hip fracture  Smoker  Polymyalgia rheumatica  Abdominal aortic aneurysm (AAA) without rupture  Essential hypertension  Falls  TIA (transient ischemic attack)  H/O abdominal aortic aneurysm repair    SOCIAL HISTORY:  Negative for smoking/alcohol/drug use.     ALLERGIES:  Fosamax (Other (Moderate))  ibuprofen (Flushing)  Novacet (Rash)  penicillin (Rash)  Sulfac 10% (Rash)  Tylenol (Pruritus)    MEDICATIONS:  STANDING MEDICATIONS  amLODIPine   Tablet 5 milliGRAM(s) Oral daily  bisacodyl 5 milliGRAM(s) Oral at bedtime  cefTRIAXone   IVPB 1 Gram(s) IV Intermittent every 24 hours  clindamycin IVPB 600 milliGRAM(s) IV Intermittent every 8 hours  clopidogrel Tablet 75 milliGRAM(s) Oral daily  docusate sodium 100 milliGRAM(s) Oral three times a day  heparin  Injectable 5000 Unit(s) SubCutaneous every 12 hours  labetalol 200 milliGRAM(s) Oral every 12 hours  mirtazapine 7.5 milliGRAM(s) Oral at bedtime  senna 2 Tablet(s) Oral at bedtime    PRN MEDICATIONS  melatonin 3 milliGRAM(s) Oral at bedtime PRN  morphine  - Injectable 2 milliGRAM(s) IV Push every 4 hours PRN  ondansetron Injectable 4 milliGRAM(s) IV Push every 4 hours PRN  oxyCODONE    IR 5 milliGRAM(s) Oral every 4 hours PRN  oxyCODONE    IR 10 milliGRAM(s) Oral every 4 hours PRN    VITALS:   T(F): 98.6  HR: 67  BP: 118/56  RR: 18  SpO2: 95%    LABS:                        11.7   12.32 )-----------( 264      ( 10 Oct 2018 06:38 )             36.6     10-10    143  |  104  |  38<H>  ----------------------------<  76  4.9   |  19  |  1.7<H>    Ca    9.0      10 Oct 2018 06:38      PT/INR - ( 08 Oct 2018 13:10 )   PT: 11.30 sec;   INR: 1.05 ratio         PTT - ( 08 Oct 2018 13:10 )  PTT:26.7 sec  Urinalysis Basic - ( 09 Oct 2018 02:30 )    Color: Dark Yellow / Appearance: Turbid / S.020 / pH: x  Gluc: x / Ketone: 15  / Bili: Negative / Urobili: 1.0 mg/dL   Blood: x / Protein: 100 mg/dL / Nitrite: Positive   Leuk Esterase: Large / RBC: 2-5 /HPF / WBC >50 /HPF   Sq Epi: x / Non Sq Epi: x / Bacteria: Many /HPF                RADIOLOGY:    PHYSICAL EXAM:  GEN: No acute distress  LUNGS: Clear to auscultation bilaterally   HEART: S1/S2 present. RRR.   ABD: Soft, non-tender, non-distended. Bowel sounds present  EXT: NC/NC/NE/2+PP/FELICIANO/Skin Intact.   NEURO: AAOX1 SUBJECTIVE:    Patient is a 83y old Female who presents with a chief complaint of right hip periprosthetic fracture (06 Oct 2018 17:33)    Currently admitted to medicine with the primary diagnosis of Intertrochanteric fracture of right hip     Today is hospital day 2d. Pt found smoking yesterday (family gave her a cigarette). Went for OR procedure yesterday evening.   83 F s/p  Removal of hardware from right hip  Intramedullary nailing of right hip, pod1.   R hip pain controlled with medication, sharp, nonradiating  no cp, sob, abd pain, fever    PAST MEDICAL & SURGICAL HISTORY  Hip fracture  Smoker  Polymyalgia rheumatica  Abdominal aortic aneurysm (AAA) without rupture  Essential hypertension  Falls  TIA (transient ischemic attack)  H/O abdominal aortic aneurysm repair    SOCIAL HISTORY:  Negative for smoking/alcohol/drug use.     ALLERGIES:  Fosamax (Other (Moderate))  ibuprofen (Flushing)  Novacet (Rash)  penicillin (Rash)  Sulfac 10% (Rash)  Tylenol (Pruritus)    MEDICATIONS:  STANDING MEDICATIONS  amLODIPine   Tablet 5 milliGRAM(s) Oral daily  bisacodyl 5 milliGRAM(s) Oral at bedtime  cefTRIAXone   IVPB 1 Gram(s) IV Intermittent every 24 hours  clindamycin IVPB 600 milliGRAM(s) IV Intermittent every 8 hours  clopidogrel Tablet 75 milliGRAM(s) Oral daily  docusate sodium 100 milliGRAM(s) Oral three times a day  heparin  Injectable 5000 Unit(s) SubCutaneous every 12 hours  labetalol 200 milliGRAM(s) Oral every 12 hours  mirtazapine 7.5 milliGRAM(s) Oral at bedtime  senna 2 Tablet(s) Oral at bedtime    PRN MEDICATIONS  melatonin 3 milliGRAM(s) Oral at bedtime PRN  morphine  - Injectable 2 milliGRAM(s) IV Push every 4 hours PRN  ondansetron Injectable 4 milliGRAM(s) IV Push every 4 hours PRN  oxyCODONE    IR 5 milliGRAM(s) Oral every 4 hours PRN  oxyCODONE    IR 10 milliGRAM(s) Oral every 4 hours PRN    VITALS:   T(F): 98.6  HR: 67  BP: 118/56  RR: 18  SpO2: 95%    LABS:                        11.7   12.32 )-----------( 264      ( 10 Oct 2018 06:38 )             36.6     10-10    143  |  104  |  38<H>  ----------------------------<  76  4.9   |  19  |  1.7<H>    Ca    9.0      10 Oct 2018 06:38      PT/INR - ( 08 Oct 2018 13:10 )   PT: 11.30 sec;   INR: 1.05 ratio         PTT - ( 08 Oct 2018 13:10 )  PTT:26.7 sec  Urinalysis Basic - ( 09 Oct 2018 02:30 )    Color: Dark Yellow / Appearance: Turbid / S.020 / pH: x  Gluc: x / Ketone: 15  / Bili: Negative / Urobili: 1.0 mg/dL   Blood: x / Protein: 100 mg/dL / Nitrite: Positive   Leuk Esterase: Large / RBC: 2-5 /HPF / WBC >50 /HPF   Sq Epi: x / Non Sq Epi: x / Bacteria: Many /HPF                RADIOLOGY:    PHYSICAL EXAM:  GEN: No acute distress  LUNGS: Clear to auscultation bilaterally   HEART: S1/S2 present. RRR.   ABD: Soft, non-tender, non-distended. Bowel sounds present  EXT: NC/NC/NE/2+PP/FELICIANO/Skin Intact.   NEURO: AAOX1

## 2018-10-10 NOTE — PROGRESS NOTE ADULT - ASSESSMENT
82 F with pmhx of HTN, TIA, h/o AAA repair, polymyalgia rheumatica, with recent acute nondisplaced right inferior femoral neck fracture, and right superior acetabular stress fracture s/p R CRPP presenting for evaluation s/p mechanical fall last night. Patient states she had extensive physical therapy session at home and was exhausted. She was walking with walker tripped and fell onto her left side now c/o pain to right hip/RLE. No head injury or LOC. No cp, sob, fever, abdominal pain, nausea, vomiting, diarrhea, back pain, urinary symptoms, headache, dizziness, paresthesias, or weakness.      #Nondisplaced incomplete perihardware fracture involving the   intertrochanteric region   -s/p  Removal of hardware from right hip. Intramedullary nailing of right hip, pod1  -f/u with ortho  -resume plavix 75mg qd     #)DANIEL on CKD III   -improving    #positive u/a  -f/u with ucx  -c/w 3 day course of rocephin    #HTN  -c/w amlodipine 5mg qd  -c/w labetalol 200mg BID    #AAA s/p repair   -c/w labetalol    #Constipation   -c/w senna, colace , miralax      DVT/GI ppx: heparin/protonix     Seen by physiatry - candidate for 4A rehab

## 2018-10-10 NOTE — PHYSICAL THERAPY INITIAL EVALUATION ADULT - CRITERIA FOR SKILLED THERAPEUTIC INTERVENTIONS
risk reduction/prevention/therapy frequency/anticipated equipment needs at discharge/functional limitations in following categories/predicted duration of therapy intervention/rehab potential/impairments found

## 2018-10-10 NOTE — PHYSICAL THERAPY INITIAL EVALUATION ADULT - PLANNED THERAPY INTERVENTIONS, PT EVAL
gait training/joint mobilization/ROM/stretching/bed mobility training/strengthening/transfer training/balance training

## 2018-10-10 NOTE — PHYSICAL THERAPY INITIAL EVALUATION ADULT - IMPAIRMENTS FOUND, PT EVAL
poor safety awareness/fine motor/aerobic capacity/endurance/gait, locomotion, and balance/muscle strength/ROM/ergonomics and body mechanics/posture/neuromotor development and sensory integration/gross motor

## 2018-10-10 NOTE — PROGRESS NOTE ADULT - SUBJECTIVE AND OBJECTIVE BOX
83 F s/p  Removal of hardware from right hip  Intramedullary nailing of right hip, pod1      Patient seen and examined at bedside. No acute events since OR.Pain controlled with medication. Denies chest pain, SOB, N/V.    Vital Signs Last 24 Hrs  T(C): 36.8 (09 Oct 2018 23:20), Max: 36.8 (09 Oct 2018 23:20)  T(F): 98.2 (09 Oct 2018 23:20), Max: 98.2 (09 Oct 2018 23:20)  HR: 66 (10 Oct 2018 05:37) (57 - 72)  BP: 143/70 (10 Oct 2018 05:37) (119/76 - 161/70)  BP(mean): --  RR: 20 (09 Oct 2018 23:35) (18 - 20)  SpO2: 95% (09 Oct 2018 23:35) (94% - 96%)    NAD    RLE  Dressing  mild serosanguinous drainage, changed  Compartments soft/compressible  Calves soft  EHL/FHL Motor intact  SILT distally  Ext wwp                                           11.7   12.32 )-----------( 264      ( 10 Oct 2018 06:38 )             36.6     10-10    143  |  104  |  38<H>  ----------------------------<  76  4.9   |  19  |  1.7<H>    Ca    9.0      10 Oct 2018 06:38          A/P: 83yF S/p R Removal of hardware from hip, Intramedullary nailing of hip pod1        - PT- WBAT  -OOBTC  -DVT PPx  -Pain Control  -SCDs  -IS  -Continue Current Tx  -Dispo planning

## 2018-10-10 NOTE — CONSULT NOTE ADULT - SUBJECTIVE AND OBJECTIVE BOX
HPI:  82 F with pmhx of HTN, TIA, h/o AAA repair, polymyalgia rheumatica, with recent acute nondisplaced right inferior femoral neck fracture, and right superior acetabular stress fracture s/p R CRPP presenting for evaluation s/p mechanical fall last night. Patient states she had extensive physical therapy session at home and was exhausted. She was walking with walker tripped and fell onto her left side now c/o pain to right hip/RLE. No head injury or LOC. No cp, sob, fever, abdominal pain, nausea, vomiting, diarrhea, back pain, urinary symptoms, headache, dizziness, paresthesias, or weakness. (05 Oct 2018 18:31). s/p SILVINA / ORIF right hip IT fx on 10/9, wbat as per ortho.      PAST MEDICAL & SURGICAL HISTORY:  Hip fracture  Smoker  Polymyalgia rheumatica  Abdominal aortic aneurysm (AAA) without rupture  Essential hypertension  Falls  TIA (transient ischemic attack)  H/O abdominal aortic aneurysm repair      Hospital Course:    TODAY'S SUBJECTIVE & REVIEW OF SYMPTOMS:     Constitutional WNL   Cardio WNL   Resp WNL   GI WNL  Heme WNL  Endo WNL  Skin WNL  MSK left hip pain  Neuro WNL  Cognitive WNL  Psych WNL      MEDICATIONS  (STANDING):  amLODIPine   Tablet 5 milliGRAM(s) Oral daily  bisacodyl 5 milliGRAM(s) Oral at bedtime  cefTRIAXone   IVPB 1 Gram(s) IV Intermittent every 24 hours  clindamycin IVPB 600 milliGRAM(s) IV Intermittent every 8 hours  clopidogrel Tablet 75 milliGRAM(s) Oral daily  docusate sodium 100 milliGRAM(s) Oral three times a day  heparin  Injectable 5000 Unit(s) SubCutaneous every 12 hours  labetalol 200 milliGRAM(s) Oral every 12 hours  mirtazapine 7.5 milliGRAM(s) Oral at bedtime  senna 2 Tablet(s) Oral at bedtime    MEDICATIONS  (PRN):  melatonin 3 milliGRAM(s) Oral at bedtime PRN Insomnia  morphine  - Injectable 2 milliGRAM(s) IV Push every 4 hours PRN Severe Pain (7 - 10)  ondansetron Injectable 4 milliGRAM(s) IV Push every 4 hours PRN Nausea and/or Vomiting  oxyCODONE    IR 5 milliGRAM(s) Oral every 4 hours PRN Mild Pain (1 - 3)  oxyCODONE    IR 10 milliGRAM(s) Oral every 4 hours PRN Moderate Pain (4 - 6)      FAMILY HISTORY:  No pertinent family history in first degree relatives      Allergies    Fosamax (Other (Moderate))  ibuprofen (Flushing)  Novacet (Rash)  penicillin (Rash)  Sulfac 10% (Rash)  Tylenol (Pruritus)    Intolerances        SOCIAL HISTORY:    [  ] Etoh  [  ] Smoking  [  ] Substance abuse     Home Environment:  [  ] Home Alone  [x  ] Lives with Family  [  ] Home Health Aid    Dwelling:  [  ] Apartment  [x  ] Private House  [  ] Adult Home  [  ] Skilled Nursing Facility      [  ] Short Term  [  ] Long Term  [x  ] Stairs       Elevator [  ]    FUNCTIONAL STATUS PTA: (Check all that apply)  Ambulation: [   ]Independent    [  ] Dependent     [  ] Non-Ambulatory  Assistive Device: [  ] SA Cane  [  ]  Q Cane  [x  ] Walker  [  ]  Wheelchair  ADL : [  ] Independent  [x  ]  Dependent       Vital Signs Last 24 Hrs  T(C): 37 (10 Oct 2018 08:24), Max: 37 (10 Oct 2018 08:24)  T(F): 98.6 (10 Oct 2018 08:24), Max: 98.6 (10 Oct 2018 08:24)  HR: 67 (10 Oct 2018 08:24) (57 - 72)  BP: 118/56 (10 Oct 2018 08:24) (118/56 - 161/70)  BP(mean): --  RR: 18 (10 Oct 2018 08:24) (18 - 20)  SpO2: 95% (09 Oct 2018 23:35) (94% - 96%)      PHYSICAL EXAM: Alert & awake  GENERAL: NAD, well-groomed, well-developed  HEAD:  Atraumatic, Normocephalic  CHEST/LUNG: Clear   HEART: S1S2+  ABDOMEN: Soft, Nontender  EXTREMITIES:  no calf tenderness    NERVOUS SYSTEM:  Cranial Nerves 2-12 intact [  ] Abnormal  [  ]  ROM: WFL all extremities [  ]  Abnormal [x  ]limited rle  Motor Strength: WFL all extremities  [  ]  Abnormal [x  ]limited rle  Sensation: intact to light touch [x  ] Abnormal [  ]  Reflexes: Symmetric [  ]  Abnormal [  ]    FUNCTIONAL STATUS:  Bed Mobility: Independent [  ]  Supervision [  ]  Needs Assistance [x  ]  N/A [  ]  Transfers: Independent [  ]  Supervision [  ]  Needs Assistance [x  ]  N/A [  ]   Ambulation: Independent [  ]  Supervision [  ]  Needs Assistance [  ]  N/A [  ]  ADL: Independent [  ] Requires Assistance [  ] N/A [  ]      LABS:                        11.7   12.32 )-----------( 264      ( 10 Oct 2018 06:38 )             36.6     10-10    143  |  104  |  38<H>  ----------------------------<  76  4.9   |  19  |  1.7<H>    Ca    9.0      10 Oct 2018 06:38      PT/INR - ( 08 Oct 2018 13:10 )   PT: 11.30 sec;   INR: 1.05 ratio         PTT - ( 08 Oct 2018 13:10 )  PTT:26.7 sec  Urinalysis Basic - ( 09 Oct 2018 02:30 )    Color: Dark Yellow / Appearance: Turbid / S.020 / pH: x  Gluc: x / Ketone: 15  / Bili: Negative / Urobili: 1.0 mg/dL   Blood: x / Protein: 100 mg/dL / Nitrite: Positive   Leuk Esterase: Large / RBC: 2-5 /HPF / WBC >50 /HPF   Sq Epi: x / Non Sq Epi: x / Bacteria: Many /HPF        RADIOLOGY & ADDITIONAL STUDIES:    Assesment:
82 F s/p CRPP right hip in August had another recently and now presents with a periprosthetic fracture.  On exam has significant pain and is unable to ambulate.  No obvious signs of wound infection or other complications.  Calves s/nt    Imaging reviewed including xrays and CT    There is an intertrochanteric fracture below the screws    findings discussed with patient.    plan for SILVINA and fixation of the IT frx  the FN frx does appear healed to me at this point.    please transfer patient to [facility] for this procedure.

## 2018-10-10 NOTE — OCCUPATIONAL THERAPY INITIAL EVALUATION ADULT - PLANNED THERAPY INTERVENTIONS, OT EVAL
balance training/strengthening/transfer training/ADL retraining/bed mobility training/cognitive, visual perceptual

## 2018-10-10 NOTE — PHYSICAL THERAPY INITIAL EVALUATION ADULT - TRANSFER SAFETY CONCERNS NOTED: BED/CHAIR, REHAB EVAL
decreased safety awareness/decreased sequencing ability/stand pivot/decreased balance during turns/decreased proprioception

## 2018-10-10 NOTE — PHYSICAL THERAPY INITIAL EVALUATION ADULT - ADDITIONAL COMMENTS
When asked social history, pt gives inconsistent or no answers at all. She uses rolling walker but does not say how many steps she has at home.

## 2018-10-11 ENCOUNTER — TRANSCRIPTION ENCOUNTER (OUTPATIENT)
Age: 83
End: 2018-10-11

## 2018-10-11 RX ORDER — ONDANSETRON 8 MG/1
0 TABLET, FILM COATED ORAL
Qty: 0 | Refills: 0 | DISCHARGE
Start: 2018-10-11

## 2018-10-11 RX ORDER — LABETALOL HCL 100 MG
1 TABLET ORAL
Qty: 0 | Refills: 0 | COMMUNITY
Start: 2018-10-11

## 2018-10-11 RX ORDER — SENNA PLUS 8.6 MG/1
2 TABLET ORAL
Qty: 0 | Refills: 0 | COMMUNITY
Start: 2018-10-11

## 2018-10-11 RX ORDER — AMLODIPINE BESYLATE 2.5 MG/1
1 TABLET ORAL
Qty: 0 | Refills: 0 | COMMUNITY
Start: 2018-10-11

## 2018-10-11 RX ORDER — DOCUSATE SODIUM 100 MG
1 CAPSULE ORAL
Qty: 0 | Refills: 0 | COMMUNITY
Start: 2018-10-11

## 2018-10-11 RX ORDER — LANOLIN ALCOHOL/MO/W.PET/CERES
1 CREAM (GRAM) TOPICAL
Qty: 0 | Refills: 0 | COMMUNITY
Start: 2018-10-11

## 2018-10-11 RX ORDER — OXYCODONE HYDROCHLORIDE 5 MG/1
1 TABLET ORAL
Qty: 0 | Refills: 0 | DISCHARGE
Start: 2018-10-11

## 2018-10-11 RX ORDER — CHLORHEXIDINE GLUCONATE 213 G/1000ML
1 SOLUTION TOPICAL
Qty: 0 | Refills: 0 | Status: DISCONTINUED | OUTPATIENT
Start: 2018-10-11 | End: 2018-10-16

## 2018-10-11 RX ORDER — AMLODIPINE BESYLATE 2.5 MG/1
1 TABLET ORAL
Qty: 0 | Refills: 0 | COMMUNITY

## 2018-10-11 RX ORDER — CEFTRIAXONE 500 MG/1
1 INJECTION, POWDER, FOR SOLUTION INTRAMUSCULAR; INTRAVENOUS
Qty: 0 | Refills: 0 | COMMUNITY
Start: 2018-10-11

## 2018-10-11 RX ADMIN — Medication 200 MILLIGRAM(S): at 05:16

## 2018-10-11 RX ADMIN — Medication 100 MILLIGRAM(S): at 21:29

## 2018-10-11 RX ADMIN — MIRTAZAPINE 7.5 MILLIGRAM(S): 45 TABLET, ORALLY DISINTEGRATING ORAL at 21:29

## 2018-10-11 RX ADMIN — CEFTRIAXONE 100 GRAM(S): 500 INJECTION, POWDER, FOR SOLUTION INTRAMUSCULAR; INTRAVENOUS at 05:16

## 2018-10-11 RX ADMIN — Medication 5 MILLIGRAM(S): at 21:29

## 2018-10-11 RX ADMIN — HEPARIN SODIUM 5000 UNIT(S): 5000 INJECTION INTRAVENOUS; SUBCUTANEOUS at 18:32

## 2018-10-11 RX ADMIN — AMLODIPINE BESYLATE 5 MILLIGRAM(S): 2.5 TABLET ORAL at 05:16

## 2018-10-11 RX ADMIN — Medication 100 MILLIGRAM(S): at 14:50

## 2018-10-11 RX ADMIN — CHLORHEXIDINE GLUCONATE 1 APPLICATION(S): 213 SOLUTION TOPICAL at 14:50

## 2018-10-11 RX ADMIN — SENNA PLUS 2 TABLET(S): 8.6 TABLET ORAL at 21:28

## 2018-10-11 RX ADMIN — HEPARIN SODIUM 5000 UNIT(S): 5000 INJECTION INTRAVENOUS; SUBCUTANEOUS at 05:17

## 2018-10-11 RX ADMIN — CLOPIDOGREL BISULFATE 75 MILLIGRAM(S): 75 TABLET, FILM COATED ORAL at 14:50

## 2018-10-11 RX ADMIN — Medication 100 MILLIGRAM(S): at 05:16

## 2018-10-11 NOTE — PROGRESS NOTE ADULT - SUBJECTIVE AND OBJECTIVE BOX
83 F s/p  Removal of hardware from right hip  Intramedullary nailing of right hip, pod2      Patient seen and examined at bedside. no complaints   NAD    RLE  Dressing  c/d/i   Compartments soft/compressible  Calves soft  EHL/FHL Motor intact  SILT distally  Ext wwp                                             A/P: 83yF S/p R Removal of hardware from hip, Intramedullary nailing of hip pod2        - PT- WBAT  -OOBTC  -DVT PPx  -Pain Control  -SCDs  -IS  -Continue Current Tx  -Dispo planning

## 2018-10-11 NOTE — DISCHARGE NOTE ADULT - OTHER SIGNIFICANT FINDINGS
< from: CT Hip No Cont, Right (10.05.18 @ 16:20) >  FINDINGS:    BONES/JOINTS: Diffuse osteopenia. Interval right femoral neck pinning.   New oblique nondisplaced anterior intratrochanteric fracture extending   from the inferior aspect of the more anterior screw into the proximal   medial femoral shaft (series 300, image 78). Stable degenerative changes   of the pubic symphysis, visualized right sacroiliac joint and right hip.    SOFT TISSUES: Scattered atherosclerotic vascular calcifications and   colonic diverticulosis. Otherwise, unremarkable.    IMPRESSION:  1.  Since August 27, 2018, interval right femoral neck pinningwith a new   nondisplaced incomplete perihardware fracture involving the   intertrochanteric region.    < end of copied text >

## 2018-10-11 NOTE — PROGRESS NOTE ADULT - ASSESSMENT
82 F with pmhx of HTN, TIA, h/o AAA repair, polymyalgia rheumatica, with recent acute nondisplaced right inferior femoral neck fracture, and right superior acetabular stress fracture s/p R CRPP presenting for evaluation s/p mechanical fall last night. Patient states she had extensive physical therapy session at home and was exhausted. She was walking with walker tripped and fell onto her left side now c/o pain to right hip/RLE. No head injury or LOC. No cp, sob, fever, abdominal pain, nausea, vomiting, diarrhea, back pain, urinary symptoms, headache, dizziness, paresthesias, or weakness.      #Nondisplaced incomplete perihardware fracture involving the   intertrochanteric region   -s/p  Removal of hardware from right hip. Intramedullary nailing of right hip, pod2  -f/u with ortho  -resume plavix 75mg qd     #)DANIEL on CKD III   -improving    #positive u/a  -f/u with ucx  -c/w 3 day course of rocephin till 10/12    #HTN  -c/w amlodipine 5mg qd  -c/w labetalol 200mg BID    #AAA s/p repair   -c/w labetalol    #Constipation   -c/w senna, colace , miralax      DVT/GI ppx: heparin/protonix     Seen by physiatry - candidate for 4A rehab.  possible d/c tomorrow to 4a rehab

## 2018-10-11 NOTE — DISCHARGE NOTE ADULT - PLAN OF CARE
resolved You had a fracture of your right hip. You had a surgery on 10/9 for your hip with no complications. Please take your pain medications as prescribed and needed. Continue physical therapy and rehab. Follow up with your primary care doctor. resolution You have a urinary tract infection which is being treated with an antibiotic. Continue taking the antibiotic till 10/12/18 to complete your course. You have a urinary tract infection which was treated with a course of antibiotics.

## 2018-10-11 NOTE — DISCHARGE NOTE ADULT - CARE PROVIDER_API CALL
Harrison Tello), Orthopaedic Surgery  [address] [place] [id number]  Phone: [phone]  Fax: [phone]    Santiago Berger), Family Medicine  [address] [place] [address]  Phone: [phone]  Fax: [phone]

## 2018-10-11 NOTE — PROGRESS NOTE ADULT - SUBJECTIVE AND OBJECTIVE BOX
SUBJECTIVE:    Patient is a 83y old Female who presents with a chief complaint of right hip periprosthetic fracture (11 Oct 2018 09:03)    Currently admitted to medicine with the primary diagnosis of Intertrochanteric fracture of right hip     Today is hospital day 2d.No o/n events.     PAST MEDICAL & SURGICAL HISTORY  Hip fracture  Smoker  Polymyalgia rheumatica  Abdominal aortic aneurysm (AAA) without rupture  Essential hypertension  Falls  TIA (transient ischemic attack)  H/O abdominal aortic aneurysm repair    SOCIAL HISTORY:  Negative for smoking/alcohol/drug use.     ALLERGIES:  Fosamax (Other (Moderate))  ibuprofen (Flushing)  Novacet (Rash)  penicillin (Rash)  Sulfac 10% (Rash)  Tylenol (Pruritus)    MEDICATIONS:  STANDING MEDICATIONS  amLODIPine   Tablet 5 milliGRAM(s) Oral daily  bisacodyl 5 milliGRAM(s) Oral at bedtime  cefTRIAXone   IVPB 1 Gram(s) IV Intermittent every 24 hours  clopidogrel Tablet 75 milliGRAM(s) Oral daily  docusate sodium 100 milliGRAM(s) Oral three times a day  heparin  Injectable 5000 Unit(s) SubCutaneous every 12 hours  labetalol 200 milliGRAM(s) Oral every 12 hours  mirtazapine 7.5 milliGRAM(s) Oral at bedtime  senna 2 Tablet(s) Oral at bedtime    PRN MEDICATIONS  melatonin 3 milliGRAM(s) Oral at bedtime PRN  morphine  - Injectable 2 milliGRAM(s) IV Push every 4 hours PRN  ondansetron Injectable 4 milliGRAM(s) IV Push every 4 hours PRN  oxyCODONE    IR 5 milliGRAM(s) Oral every 4 hours PRN  oxyCODONE    IR 10 milliGRAM(s) Oral every 4 hours PRN    VITALS:   T(F): 97.6  HR: 60  BP: 137/62  RR: 18  SpO2: --    LABS:                        11.7   12.32 )-----------( 264      ( 10 Oct 2018 06:38 )             36.6     10-10    143  |  104  |  38<H>  ----------------------------<  76  4.9   |  19  |  1.7<H>    Ca    9.0      10 Oct 2018 06:38                Culture - Urine (collected 09 Oct 2018 08:48)  Source: .Urine Clean Catch (Midstream)  Preliminary Report (10 Oct 2018 22:30):    50,000 - 99,000 CFU/mL Escherichia coli          RADIOLOGY:    PHYSICAL EXAM:  GEN: No acute distress  LUNGS: Clear to auscultation bilaterally   HEART: S1/S2 present. RRR.   ABD: Soft, non-tender, non-distended. Bowel sounds present  EXT: NC/NC/NE/2+PP/FELICIANO/Skin Intact.   NEURO: AAOX1 to person SUBJECTIVE:    Patient is a 83y old Female who presents with a chief complaint of right hip periprosthetic fracture (11 Oct 2018 09:03)    Currently admitted to medicine with the primary diagnosis of Intertrochanteric fracture of right hip     Today is hospital day 2d.No o/n events.   no cp, sob, abd pain, fever    PAST MEDICAL & SURGICAL HISTORY  Hip fracture  Smoker  Polymyalgia rheumatica  Abdominal aortic aneurysm (AAA) without rupture  Essential hypertension  Falls  TIA (transient ischemic attack)  H/O abdominal aortic aneurysm repair    SOCIAL HISTORY:  Negative for smoking/alcohol/drug use.     ALLERGIES:  Fosamax (Other (Moderate))  ibuprofen (Flushing)  Novacet (Rash)  penicillin (Rash)  Sulfac 10% (Rash)  Tylenol (Pruritus)    MEDICATIONS:  STANDING MEDICATIONS  amLODIPine   Tablet 5 milliGRAM(s) Oral daily  bisacodyl 5 milliGRAM(s) Oral at bedtime  cefTRIAXone   IVPB 1 Gram(s) IV Intermittent every 24 hours  clopidogrel Tablet 75 milliGRAM(s) Oral daily  docusate sodium 100 milliGRAM(s) Oral three times a day  heparin  Injectable 5000 Unit(s) SubCutaneous every 12 hours  labetalol 200 milliGRAM(s) Oral every 12 hours  mirtazapine 7.5 milliGRAM(s) Oral at bedtime  senna 2 Tablet(s) Oral at bedtime    PRN MEDICATIONS  melatonin 3 milliGRAM(s) Oral at bedtime PRN  morphine  - Injectable 2 milliGRAM(s) IV Push every 4 hours PRN  ondansetron Injectable 4 milliGRAM(s) IV Push every 4 hours PRN  oxyCODONE    IR 5 milliGRAM(s) Oral every 4 hours PRN  oxyCODONE    IR 10 milliGRAM(s) Oral every 4 hours PRN    VITALS:   T(F): 97.6  HR: 60  BP: 137/62  RR: 18  SpO2: --    LABS:                        11.7   12.32 )-----------( 264      ( 10 Oct 2018 06:38 )             36.6     10-10    143  |  104  |  38<H>  ----------------------------<  76  4.9   |  19  |  1.7<H>    Ca    9.0      10 Oct 2018 06:38                Culture - Urine (collected 09 Oct 2018 08:48)  Source: .Urine Clean Catch (Midstream)  Preliminary Report (10 Oct 2018 22:30):    50,000 - 99,000 CFU/mL Escherichia coli          RADIOLOGY:    PHYSICAL EXAM:  GEN: No acute distress  LUNGS: Clear to auscultation bilaterally   HEART: S1/S2 present. RRR.   ABD: Soft, non-tender, non-distended. Bowel sounds present  EXT: NC/NC/NE/2+PP/FELICIANO/Skin Intact.   NEURO: AAOX1 to person

## 2018-10-11 NOTE — DISCHARGE NOTE ADULT - CARE PLAN
Principal Discharge DX:	Intertrochanteric fracture of right hip  Goal:	resolved  Assessment and plan of treatment:	You had a fracture of your right hip. You had a surgery on 10/9 for your hip with no complications. Please take your pain medications as prescribed and needed. Continue physical therapy and rehab. Follow up with your primary care doctor.  Secondary Diagnosis:	UTI (urinary tract infection)  Goal:	resolution  Assessment and plan of treatment:	You have a urinary tract infection which is being treated with an antibiotic. Continue taking the antibiotic till 10/12/18 to complete your course. Principal Discharge DX:	Intertrochanteric fracture of right hip  Goal:	resolved  Assessment and plan of treatment:	You had a fracture of your right hip. You had a surgery on 10/9 for your hip with no complications. Please take your pain medications as prescribed and needed. Continue physical therapy and rehab. Follow up with your primary care doctor.  Secondary Diagnosis:	UTI (urinary tract infection)  Goal:	resolved  Assessment and plan of treatment:	You have a urinary tract infection which was treated with a course of antibiotics.

## 2018-10-11 NOTE — DISCHARGE NOTE ADULT - HOSPITAL COURSE
82 F with pmhx of HTN, TIA, h/o AAA repair, polymyalgia rheumatica, with recent acute nondisplaced right inferior femoral neck fracture, and right superior acetabular stress fracture s/p R CRPP presenting for evaluation s/p mechanical fall. Patient states she had extensive physical therapy session at home and was exhausted. She was walking with walker tripped and fell onto her left side c/o pain to right hip/RLE. X-ray shows nondisplaced incomplete perihardware fracture involving the intertrochanteric region. Ortho performed removal of hardware from hip & intramedullary nailing of hip on 10/9/18 . No complications. Pt will be discharged to 4a rehab.    Pt also had a UTI with cx positive for E coli - treated with 3 day course of Rocephin.     Pt medically stable to be discharged to 4a rehab. 82 F with pmhx of HTN, TIA, h/o AAA repair, polymyalgia rheumatica, with recent acute nondisplaced right inferior femoral neck fracture, and right superior acetabular stress fracture s/p R CRPP presenting for evaluation s/p mechanical fall. Patient states she had extensive physical therapy session at home and was exhausted. She was walking with walker tripped and fell onto her left side c/o pain to right hip/RLE. X-ray shows nondisplaced incomplete perihardware fracture involving the intertrochanteric region. Ortho performed removal of hardware from hip & intramedullary nailing of hip on 10/9/18 . No complications. Pt will be discharged to [facility].    Pt also had a UTI with cx positive for E coli - treated with 3 day course of Rocephin.     Pt medically stable to be discharged to [facility].. 82 F with pmhx of HTN, TIA, h/o AAA repair, polymyalgia rheumatica, with recent acute nondisplaced right inferior femoral neck fracture, and right superior acetabular stress fracture s/p R CRPP presenting for evaluation s/p mechanical fall. Patient states she had extensive physical therapy session at home and was exhausted. She was walking with walker tripped and fell onto her left side c/o pain to right hip/RLE. X-ray shows nondisplaced incomplete perihardware fracture involving the intertrochanteric region. Ortho performed removal of hardware from hip & intramedullary nailing of hip on 10/9/18 . No complications. Pt will be discharged to [facility].    Pt also had a UTI with cx positive for E coli - treated with 3 day course of Rocephin.     Pt medically stable to be discharged to [facility]..    attendin minutes spent on discharge planning.

## 2018-10-11 NOTE — DISCHARGE NOTE ADULT - MEDICATION SUMMARY - MEDICATIONS TO TAKE
I will START or STAY ON the medications listed below when I get home from the hospital:    oxyCODONE 5 mg oral tablet  -- 1 tab(s) by mouth every 4 hours, As needed, Mild Pain (1 - 3)  -- Indication: For pain    oxyCODONE 10 mg oral tablet  -- 1 tab(s) by mouth every 4 hours, As needed, Moderate Pain (4 - 6)  -- Indication: For pain    LOSARTAN POT TAB 50MG  -- 1  by mouth once a day  -- Indication: For Hypertension    GABAPENTIN   /5ML  -- 5 milliliter(s) by mouth 4 times a day  -- Indication: For neuropathy    MIRTAZAPINE  TAB 7.5MG  -- 1  by mouth once a day  -- Indication: For depression    ondansetron 2 mg/mL injectable solution  --  injectable   -- Indication: For nausea    CLOPIDOGREL  TAB 75MG  -- 1  by mouth once a day  -- Indication: For blood thinner    labetalol 200 mg oral tablet  -- 1 tab(s) by mouth every 12 hours  -- Indication: For Hypertension    amLODIPine 5 mg oral tablet  -- 1 tab(s) by mouth once a day  -- Indication: For Hypertension    cefTRIAXone 1 g intravenous injection  -- 1 gram(s) intravenous every 24 hours till 10/12/18  -- Indication: For UTI    bisacodyl 5 mg oral delayed release tablet  -- 1 tab(s) by mouth once a day (at bedtime)  -- Indication: For constipation    docusate sodium 100 mg oral capsule  -- 1 cap(s) by mouth 3 times a day  -- Indication: For constipation    senna oral tablet  -- 2 tab(s) by mouth once a day (at bedtime)  -- Indication: For constipation    melatonin 3 mg oral tablet  -- 1 tab(s) by mouth once a day (at bedtime), As needed, Insomnia  -- Indication: For Insomnia I will START or STAY ON the medications listed below when I get home from the hospital:    oxyCODONE 5 mg oral tablet  -- 1 tab(s) by mouth every 4 hours, As needed, Mild Pain (1 - 3)  -- Indication: For pain    oxyCODONE 10 mg oral tablet  -- 1 tab(s) by mouth every 4 hours, As needed, Moderate Pain (4 - 6)  -- Indication: For pain    LOSARTAN POT TAB 50MG  -- 1  by mouth once a day  -- Indication: For Hypertension    GABAPENTIN   /5ML  -- 5 milliliter(s) by mouth 4 times a day  -- Indication: For neuropathy    MIRTAZAPINE  TAB 7.5MG  -- 1  by mouth once a day  -- Indication: For depression    ondansetron 2 mg/mL injectable solution  --  injectable   -- Indication: For nausea    CLOPIDOGREL  TAB 75MG  -- 1  by mouth once a day  -- Indication: For blood thinner    labetalol 200 mg oral tablet  -- 1 tab(s) by mouth every 12 hours  -- Indication: For Hypertension    amLODIPine 5 mg oral tablet  -- 1 tab(s) by mouth once a day  -- Indication: For Hypertension    bisacodyl 5 mg oral delayed release tablet  -- 1 tab(s) by mouth once a day (at bedtime)  -- Indication: For constipation    docusate sodium 100 mg oral capsule  -- 1 cap(s) by mouth 3 times a day  -- Indication: For constipation    senna oral tablet  -- 2 tab(s) by mouth once a day (at bedtime)  -- Indication: For constipation    melatonin 3 mg oral tablet  -- 1 tab(s) by mouth once a day (at bedtime), As needed, Insomnia  -- Indication: For Insomnia

## 2018-10-11 NOTE — DISCHARGE NOTE ADULT - PATIENT PORTAL LINK FT
You can access the [facility] Patient Portal, offered by [facility], by registering with the following website: [email or web address]

## 2018-10-12 LAB
ANION GAP SERPL CALC-SCNC: 17 MMOL/L — HIGH (ref 7–14)
BASOPHILS # BLD AUTO: 0.02 K/UL — SIGNIFICANT CHANGE UP (ref 0–0.2)
BASOPHILS NFR BLD AUTO: 0.2 % — SIGNIFICANT CHANGE UP (ref 0–1)
BUN SERPL-MCNC: 33 MG/DL — HIGH (ref 10–20)
CALCIUM SERPL-MCNC: 9 MG/DL — SIGNIFICANT CHANGE UP (ref 8.5–10.1)
CHLORIDE SERPL-SCNC: 101 MMOL/L — SIGNIFICANT CHANGE UP (ref 98–110)
CO2 SERPL-SCNC: 19 MMOL/L — SIGNIFICANT CHANGE UP (ref 17–32)
CREAT SERPL-MCNC: 1.3 MG/DL — SIGNIFICANT CHANGE UP (ref 0.7–1.5)
EOSINOPHIL # BLD AUTO: 0.08 K/UL — SIGNIFICANT CHANGE UP (ref 0–0.7)
EOSINOPHIL NFR BLD AUTO: 0.9 % — SIGNIFICANT CHANGE UP (ref 0–8)
GLUCOSE SERPL-MCNC: 100 MG/DL — HIGH (ref 70–99)
HCT VFR BLD CALC: 35.3 % — LOW (ref 37–47)
HGB BLD-MCNC: 11.5 G/DL — LOW (ref 12–16)
IMM GRANULOCYTES NFR BLD AUTO: 0.6 % — HIGH (ref 0.1–0.3)
LYMPHOCYTES # BLD AUTO: 1.14 K/UL — LOW (ref 1.2–3.4)
LYMPHOCYTES # BLD AUTO: 13.3 % — LOW (ref 20.5–51.1)
MCHC RBC-ENTMCNC: 28.7 PG — SIGNIFICANT CHANGE UP (ref 27–31)
MCHC RBC-ENTMCNC: 32.6 G/DL — SIGNIFICANT CHANGE UP (ref 32–37)
MCV RBC AUTO: 88 FL — SIGNIFICANT CHANGE UP (ref 81–99)
MONOCYTES # BLD AUTO: 0.59 K/UL — SIGNIFICANT CHANGE UP (ref 0.1–0.6)
MONOCYTES NFR BLD AUTO: 6.9 % — SIGNIFICANT CHANGE UP (ref 1.7–9.3)
NEUTROPHILS # BLD AUTO: 6.68 K/UL — HIGH (ref 1.4–6.5)
NEUTROPHILS NFR BLD AUTO: 78.1 % — HIGH (ref 42.2–75.2)
NRBC # BLD: 0 /100 WBCS — SIGNIFICANT CHANGE UP (ref 0–0)
PLATELET # BLD AUTO: 239 K/UL — SIGNIFICANT CHANGE UP (ref 130–400)
POTASSIUM SERPL-MCNC: 4.4 MMOL/L — SIGNIFICANT CHANGE UP (ref 3.5–5)
POTASSIUM SERPL-SCNC: 4.4 MMOL/L — SIGNIFICANT CHANGE UP (ref 3.5–5)
RBC # BLD: 4.01 M/UL — LOW (ref 4.2–5.4)
RBC # FLD: 13.6 % — SIGNIFICANT CHANGE UP (ref 11.5–14.5)
SODIUM SERPL-SCNC: 137 MMOL/L — SIGNIFICANT CHANGE UP (ref 135–146)
WBC # BLD: 8.56 K/UL — SIGNIFICANT CHANGE UP (ref 4.8–10.8)
WBC # FLD AUTO: 8.56 K/UL — SIGNIFICANT CHANGE UP (ref 4.8–10.8)

## 2018-10-12 RX ADMIN — MIRTAZAPINE 7.5 MILLIGRAM(S): 45 TABLET, ORALLY DISINTEGRATING ORAL at 21:17

## 2018-10-12 RX ADMIN — CHLORHEXIDINE GLUCONATE 1 APPLICATION(S): 213 SOLUTION TOPICAL at 05:09

## 2018-10-12 RX ADMIN — CLOPIDOGREL BISULFATE 75 MILLIGRAM(S): 75 TABLET, FILM COATED ORAL at 12:06

## 2018-10-12 RX ADMIN — HEPARIN SODIUM 5000 UNIT(S): 5000 INJECTION INTRAVENOUS; SUBCUTANEOUS at 17:52

## 2018-10-12 RX ADMIN — AMLODIPINE BESYLATE 5 MILLIGRAM(S): 2.5 TABLET ORAL at 05:09

## 2018-10-12 RX ADMIN — HEPARIN SODIUM 5000 UNIT(S): 5000 INJECTION INTRAVENOUS; SUBCUTANEOUS at 05:09

## 2018-10-12 RX ADMIN — Medication 100 MILLIGRAM(S): at 05:09

## 2018-10-12 RX ADMIN — Medication 200 MILLIGRAM(S): at 05:09

## 2018-10-12 RX ADMIN — CEFTRIAXONE 100 GRAM(S): 500 INJECTION, POWDER, FOR SOLUTION INTRAMUSCULAR; INTRAVENOUS at 05:07

## 2018-10-12 NOTE — DIETITIAN INITIAL EVALUATION ADULT. - MD RECOMMEND
Please order ENSURE CLEAR q12hr and Will continue Regular diet for now since patient does not eat too much of the food anyway. Will consider adding LOW SODIUM if PO improves.

## 2018-10-12 NOTE — DIETITIAN INITIAL EVALUATION ADULT. - PHYSICAL APPEARANCE
well nourished/BMI is 23.7 (height reported was 5'1") UBW stable at 115# per pt but weight documented was 125#. questioning reporting accuracy

## 2018-10-12 NOTE — PROGRESS NOTE ADULT - SUBJECTIVE AND OBJECTIVE BOX
SUBJECTIVE:    Patient is a 83y old Female who presents with a chief complaint of right hip periprosthetic fracture (11 Oct 2018 09:03)    Currently admitted to medicine with the primary diagnosis of Intertrochanteric fracture of right hip     Today is hospital day 2d. no o/n events.     PAST MEDICAL & SURGICAL HISTORY  Hip fracture  Smoker  Polymyalgia rheumatica  Abdominal aortic aneurysm (AAA) without rupture  Essential hypertension  Falls  TIA (transient ischemic attack)  H/O abdominal aortic aneurysm repair    SOCIAL HISTORY:  Negative for smoking/alcohol/drug use.     ALLERGIES:  Fosamax (Other (Moderate))  ibuprofen (Flushing)  Novacet (Rash)  penicillin (Rash)  Sulfac 10% (Rash)  Tylenol (Pruritus)    MEDICATIONS:  STANDING MEDICATIONS  amLODIPine   Tablet 5 milliGRAM(s) Oral daily  bisacodyl 5 milliGRAM(s) Oral at bedtime  chlorhexidine 4% Liquid 1 Application(s) Topical <User Schedule>  clopidogrel Tablet 75 milliGRAM(s) Oral daily  docusate sodium 100 milliGRAM(s) Oral three times a day  heparin  Injectable 5000 Unit(s) SubCutaneous every 12 hours  labetalol 200 milliGRAM(s) Oral every 12 hours  mirtazapine 7.5 milliGRAM(s) Oral at bedtime  senna 2 Tablet(s) Oral at bedtime    PRN MEDICATIONS  melatonin 3 milliGRAM(s) Oral at bedtime PRN  morphine  - Injectable 2 milliGRAM(s) IV Push every 4 hours PRN  ondansetron Injectable 4 milliGRAM(s) IV Push every 4 hours PRN  oxyCODONE    IR 5 milliGRAM(s) Oral every 4 hours PRN  oxyCODONE    IR 10 milliGRAM(s) Oral every 4 hours PRN    VITALS:   T(F): 97.4  HR: 57  BP: 143/65  RR: 18  SpO2: --    LABS:                        11.5   8.56  )-----------( 239      ( 12 Oct 2018 05:54 )             35.3     10-12    137  |  101  |  33<H>  ----------------------------<  100<H>  4.4   |  19  |  1.3    Ca    9.0      12 Oct 2018 05:54                    RADIOLOGY:    PHYSICAL EXAM:  GEN: No acute distress  LUNGS: Clear to auscultation bilaterally   HEART: S1/S2 present. RRR.   ABD: Soft, non-tender, non-distended. Bowel sounds present  EXT: NC/NC/NE/2+PP/FELICIANO/Skin Intact.   NEURO: AAOX1 to person SUBJECTIVE:    Patient is a 83y old Female who presents with a chief complaint of right hip periprosthetic fracture (11 Oct 2018 09:03)    Currently admitted to medicine with the primary diagnosis of Intertrochanteric fracture of right hip     Today is hospital day 2d. no o/n events.   diarrhea, no abd pain, fever, bloody stool  no cp, abd pain, sob    PAST MEDICAL & SURGICAL HISTORY  Hip fracture  Smoker  Polymyalgia rheumatica  Abdominal aortic aneurysm (AAA) without rupture  Essential hypertension  Falls  TIA (transient ischemic attack)  H/O abdominal aortic aneurysm repair    SOCIAL HISTORY:  Negative for smoking/alcohol/drug use.     ALLERGIES:  Fosamax (Other (Moderate))  ibuprofen (Flushing)  Novacet (Rash)  penicillin (Rash)  Sulfac 10% (Rash)  Tylenol (Pruritus)    MEDICATIONS:  STANDING MEDICATIONS  amLODIPine   Tablet 5 milliGRAM(s) Oral daily  bisacodyl 5 milliGRAM(s) Oral at bedtime  chlorhexidine 4% Liquid 1 Application(s) Topical <User Schedule>  clopidogrel Tablet 75 milliGRAM(s) Oral daily  docusate sodium 100 milliGRAM(s) Oral three times a day  heparin  Injectable 5000 Unit(s) SubCutaneous every 12 hours  labetalol 200 milliGRAM(s) Oral every 12 hours  mirtazapine 7.5 milliGRAM(s) Oral at bedtime  senna 2 Tablet(s) Oral at bedtime    PRN MEDICATIONS  melatonin 3 milliGRAM(s) Oral at bedtime PRN  morphine  - Injectable 2 milliGRAM(s) IV Push every 4 hours PRN  ondansetron Injectable 4 milliGRAM(s) IV Push every 4 hours PRN  oxyCODONE    IR 5 milliGRAM(s) Oral every 4 hours PRN  oxyCODONE    IR 10 milliGRAM(s) Oral every 4 hours PRN    VITALS:   T(F): 97.4  HR: 57  BP: 143/65  RR: 18  SpO2: --    LABS:                        11.5   8.56  )-----------( 239      ( 12 Oct 2018 05:54 )             35.3     10-12    137  |  101  |  33<H>  ----------------------------<  100<H>  4.4   |  19  |  1.3    Ca    9.0      12 Oct 2018 05:54                    RADIOLOGY:    PHYSICAL EXAM:  GEN: No acute distress  LUNGS: Clear to auscultation bilaterally   HEART: S1/S2 present. RRR.   ABD: Soft, non-tender, non-distended. Bowel sounds present  EXT: NC/NC/NE/2+PP/FELICIANO/Skin Intact.   NEURO: AAOX1 to person

## 2018-10-12 NOTE — PROGRESS NOTE ADULT - ASSESSMENT
82 F with pmhx of HTN, TIA, h/o AAA repair, polymyalgia rheumatica, with recent acute nondisplaced right inferior femoral neck fracture, and right superior acetabular stress fracture s/p R CRPP presenting for evaluation s/p mechanical fall last night. Patient states she had extensive physical therapy session at home and was exhausted. She was walking with walker tripped and fell onto her left side now c/o pain to right hip/RLE. No head injury or LOC. No cp, sob, fever, abdominal pain, nausea, vomiting, diarrhea, back pain, urinary symptoms, headache, dizziness, paresthesias, or weakness.      #Nondisplaced incomplete perihardware fracture involving the   intertrochanteric region   -s/p  Removal of hardware from right hip. Intramedullary nailing of right hip, pod2  -resume plavix 75mg qd     #)DANIEL on CKD III   -improving    #positive u/a  -f/u with ucx  -finished 3 day course of rocephin till 10/12    #HTN  -c/w amlodipine 5mg qd  -c/w labetalol 200mg BID    #AAA s/p repair   -c/w labetalol    #Constipation   -c/w senna, colace , miralax      DVT/GI ppx: heparin/protonix     Seen by physiatry - candidate for 4A rehab.  possible d/c today

## 2018-10-12 NOTE — DIETITIAN INITIAL EVALUATION ADULT. - OTHER INFO
Nondisplaced incomplete perihardware fx to intertrochanteric region. Ortho following s/p removal of hardware of R hip. f/u ortho. DANIEL on CKD3 improving. Constipation on bowel regimen. Seen by physiatry. 4A rehab

## 2018-10-12 NOTE — DIETITIAN INITIAL EVALUATION ADULT. - DIET TYPE
pt reports eating 25-50% of breakfast (observed as well), and not a fan of the food here. Agreed to try Ensure Clear but NOT other supplements because she won't like those./regular

## 2018-10-12 NOTE — DIETITIAN INITIAL EVALUATION ADULT. - SOURCE
patient/Patient has been known to have confusion ALL week but today pt seems somewhat alert and aware, questionable mental status but able to answer all questions.

## 2018-10-13 RX ADMIN — AMLODIPINE BESYLATE 5 MILLIGRAM(S): 2.5 TABLET ORAL at 05:33

## 2018-10-13 RX ADMIN — Medication 5 MILLIGRAM(S): at 21:14

## 2018-10-13 RX ADMIN — MIRTAZAPINE 7.5 MILLIGRAM(S): 45 TABLET, ORALLY DISINTEGRATING ORAL at 21:14

## 2018-10-13 RX ADMIN — HEPARIN SODIUM 5000 UNIT(S): 5000 INJECTION INTRAVENOUS; SUBCUTANEOUS at 17:58

## 2018-10-13 RX ADMIN — Medication 200 MILLIGRAM(S): at 17:57

## 2018-10-13 RX ADMIN — HEPARIN SODIUM 5000 UNIT(S): 5000 INJECTION INTRAVENOUS; SUBCUTANEOUS at 05:34

## 2018-10-13 RX ADMIN — Medication 100 MILLIGRAM(S): at 21:14

## 2018-10-13 RX ADMIN — CHLORHEXIDINE GLUCONATE 1 APPLICATION(S): 213 SOLUTION TOPICAL at 05:33

## 2018-10-13 RX ADMIN — Medication 200 MILLIGRAM(S): at 05:33

## 2018-10-13 RX ADMIN — SENNA PLUS 2 TABLET(S): 8.6 TABLET ORAL at 21:13

## 2018-10-13 RX ADMIN — CLOPIDOGREL BISULFATE 75 MILLIGRAM(S): 75 TABLET, FILM COATED ORAL at 11:21

## 2018-10-13 NOTE — PROGRESS NOTE ADULT - SUBJECTIVE AND OBJECTIVE BOX
MAURICIO KONG  83y  [facility] [id number]      Patient is a 83y old  Female who presents with a chief complaint of hip fracture (12 Oct 2018 15:44)      INTERVAL HPI/OVERNIGHT EVENTS:      no acute events overnight   REVIEW OF SYSTEMS:  CONSTITUTIONAL: No fever, weight loss, or fatigue  EYES: No eye pain, visual disturbances, or discharge  ENMT:  No difficulty hearing, tinnitus, vertigo; No sinus or throat pain  NECK: No pain or stiffness  BREASTS: No pain, masses, or nipple discharge  RESPIRATORY: No cough, wheezing, chills or hemoptysis; No shortness of breath  CARDIOVASCULAR: No chest pain, palpitations, dizziness, or leg swelling  GASTROINTESTINAL: No abdominal or epigastric pain. No nausea, vomiting, or hematemesis; No diarrhea or constipation. No melena or hematochezia.  GENITOURINARY: No dysuria, frequency, hematuria, or incontinence  NEUROLOGICAL: No headaches, memory loss, loss of strength, numbness, or tremors  SKIN: No itching, burning, rashes, or lesions   LYMPH NODES: No enlarged glands  ENDOCRINE: No heat or cold intolerance; No hair loss  MUSCULOSKELETAL: right hip pain on movement  PSYCHIATRIC: No depression, anxiety, mood swings, or difficulty sleeping  HEME/LYMPH: No easy bruising, or bleeding gums  ALLERY AND IMMUNOLOGIC: No hives or eczema  FAMILY HISTORY:  No pertinent family history in first degree relatives    T(C): 36 (10-13-18 @ 07:30), Max: 36.3 (10-12-18 @ 23:30)  HR: 68 (10-13-18 @ 07:30) (58 - 68)  BP: 139/72 (10-13-18 @ 07:30) (138/60 - 148/69)  RR: 18 (10-13-18 @ 07:30) (18 - 18)  SpO2: 96% (10-12-18 @ 17:42) (96% - 96%)  Wt(kg): --Vital Signs Last 24 Hrs  T(C): 36 (13 Oct 2018 07:30), Max: 36.3 (12 Oct 2018 23:30)  T(F): 96.8 (13 Oct 2018 07:30), Max: 97.3 (12 Oct 2018 23:30)  HR: 68 (13 Oct 2018 07:30) (58 - 68)  BP: 139/72 (13 Oct 2018 07:30) (138/60 - 148/69)  BP(mean): --  RR: 18 (13 Oct 2018 07:30) (18 - 18)  SpO2: 96% (12 Oct 2018 17:42) (96% - 96%)    PHYSICAL EXAM:  GENERAL: NAD, well-groomed, well-developed  HEAD:  Atraumatic, Normocephalic  EYES: EOMI, PERRLA, conjunctiva and sclera clear  ENMT: No tonsillar erythema, exudates, or enlargement; Moist mucous membranes, Good dentition, No lesions  NECK: Supple, No JVD, Normal thyroid  NERVOUS SYSTEM:  Alert & Oriented X3, Good concentration; Motor Strength 5/5 B/L upper and lower extremities; DTRs 2+ intact and symmetric  PULM: Clear to auscultation bilaterally  CARDIAC: Regular rate and rhythm; No murmurs, rubs, or gallops  GI: Soft, Nontender, Nondistended; Bowel sounds present  EXTREMITIES:  2+ Peripheral Pulses, No clubbing, cyanosis, or edema  LYMPH: No lymphadenopathy noted  SKIN: No rashes or lesions    Consultant(s) Notes Reviewed:  [x ] YES  [ ] NO  Care Discussed with Consultants/Other Providers [ x] YES  [ ] NO    LABS:                            11.5   8.56  )-----------( 239      ( 12 Oct 2018 05:54 )             35.3   10-12    137  |  101  |  33<H>  ----------------------------<  100<H>  4.4   |  19  |  1.3    Ca    9.0      12 Oct 2018 05:54              amLODIPine   Tablet 5 milliGRAM(s) Oral daily  bisacodyl 5 milliGRAM(s) Oral at bedtime  chlorhexidine 4% Liquid 1 Application(s) Topical <User Schedule>  clopidogrel Tablet 75 milliGRAM(s) Oral daily  docusate sodium 100 milliGRAM(s) Oral three times a day  heparin  Injectable 5000 Unit(s) SubCutaneous every 12 hours  labetalol 200 milliGRAM(s) Oral every 12 hours  melatonin 3 milliGRAM(s) Oral at bedtime PRN  mirtazapine 7.5 milliGRAM(s) Oral at bedtime  morphine  - Injectable 2 milliGRAM(s) IV Push every 4 hours PRN  ondansetron Injectable 4 milliGRAM(s) IV Push every 4 hours PRN  oxyCODONE    IR 5 milliGRAM(s) Oral every 4 hours PRN  oxyCODONE    IR 10 milliGRAM(s) Oral every 4 hours PRN  senna 2 Tablet(s) Oral at bedtime      HEALTH ISSUES - PROBLEM Dx:          Case Discussed with House Staff   45 minutes spent on total encounter; more than 50% of the visit was spent counseling and/or coordinating care by the attending physician.

## 2018-10-13 NOTE — PROGRESS NOTE ADULT - ASSESSMENT
#Nondisplaced incomplete perihardware fracture involving the   intertrochanteric region   -s/p  Removal of hardware from right hip. Intramedullary nailing of right hip, pod3  -pt rehab    #)DANIEL on CKD III   -resolved     #positive u/a, assymptomatic bacteruria e coli sensitive to cephalosporin  -f/u with ucx  Culture - Urine (10.09.18 @ 08:48)    -  Gentamicin: S <=1    -  Imipenem: S <=1    -  Levofloxacin: S <=1    -  Meropenem: S <=1    -  Nitrofurantoin: S <=32 Should not be used to treat pyelonephritis    -  Piperacillin/Tazobactam: S <=8    -  Tigecycline: S <=1    -  Tobramycin: S <=2    -  Trimethoprim/Sulfamethoxazole: S <=0.5/9.5    -  Amikacin: S <=8    -  Amoxicillin/Clavulanic Acid: S <=8/4    -  Ampicillin: S 8 These ampicillin results predict results for amoxicillin    -  Ampicillin/Sulbactam: S <=4/2    -  Aztreonam: S <=4    -  Cefazolin: S <=2 For uncomplicated UTI with K. pneumoniae, E. coli, or P. mirablis: DARLING <=16 is sensitive and DARLING >=32 is resistant. This also predicts results for oral agents cefaclor, cefdinir, cefpodoxime, cefprozil, cefuroxime axetil, cephalexin and locarbef for uncomplicated UTI. Note that some isolates may be susceptible to these agents while testing resistant to cefazolin.    -  Cefepime: S <=2    -  Cefoxitin: S 8    -  Ceftriaxone: S <=1 Enterobacter, Citrobacter, and Serratia may develop resistance during prolonged therapy    -  Ciprofloxacin: S <=0.5    -  Ertapenem: S <=0.5    Specimen Source: .Urine Clean Catch (Midstream)    Culture Results:   50,000 - 99,000 CFU/mL Escherichia coli    Organism Identification: Escherichia coli    Organism: Escherichia coli    Method Type: DARLING  finished antibiotics       #HTN  -c/w amlodipine 5mg qd  -c/w labetalol 200mg BID    #AAA s/p repair   -c/w labetalol    #Constipation   -c/w senna, colace , miralax    #Anemia no signs of acute blood loss. no need for transfusion      DVT/GI ppx: heparin/protonix     Awaiting placement for rehab, patient remarks that family works at [facility] and would like to be placed today

## 2018-10-13 NOTE — PROGRESS NOTE ADULT - SUBJECTIVE AND OBJECTIVE BOX
83 F s/p  Removal of hardware from right hip  Intramedullary nailing of right hip, pod4      Patient seen and examined at bedside. no complaints   NAD    RLE  Dressing  c/d/i   Compartments soft/compressible  Calves soft  EHL/FHL Motor intact  SILT distally  Ext wwp                                             A/P: 83yF S/p R Removal of hardware from hip, Intramedullary nailing of hip pod4        - PT- WBAT  -OOBTC  -DVT PPx  -Pain Control  -SCDs  -IS  -Continue Current Tx  -Dispo planning

## 2018-10-14 RX ADMIN — CHLORHEXIDINE GLUCONATE 1 APPLICATION(S): 213 SOLUTION TOPICAL at 06:14

## 2018-10-14 RX ADMIN — Medication 200 MILLIGRAM(S): at 17:44

## 2018-10-14 RX ADMIN — AMLODIPINE BESYLATE 5 MILLIGRAM(S): 2.5 TABLET ORAL at 06:14

## 2018-10-14 RX ADMIN — HEPARIN SODIUM 5000 UNIT(S): 5000 INJECTION INTRAVENOUS; SUBCUTANEOUS at 17:45

## 2018-10-14 RX ADMIN — CLOPIDOGREL BISULFATE 75 MILLIGRAM(S): 75 TABLET, FILM COATED ORAL at 14:14

## 2018-10-14 RX ADMIN — HEPARIN SODIUM 5000 UNIT(S): 5000 INJECTION INTRAVENOUS; SUBCUTANEOUS at 06:13

## 2018-10-14 RX ADMIN — Medication 200 MILLIGRAM(S): at 06:14

## 2018-10-14 RX ADMIN — MIRTAZAPINE 7.5 MILLIGRAM(S): 45 TABLET, ORALLY DISINTEGRATING ORAL at 22:17

## 2018-10-14 RX ADMIN — Medication 100 MILLIGRAM(S): at 06:14

## 2018-10-14 NOTE — PROGRESS NOTE ADULT - SUBJECTIVE AND OBJECTIVE BOX
SUBJECTIVE:    Patient is a 83y old Female who presents with a chief complaint of hip fracture (12 Oct 2018 15:44)    Currently admitted to medicine with the primary diagnosis of Intertrochanteric fracture of right hip     Today is hospital day 2d. This morning she is resting comfortably in bed and reports no new issues or overnight events.     PAST MEDICAL & SURGICAL HISTORY  Hip fracture  Smoker  Polymyalgia rheumatica  Abdominal aortic aneurysm (AAA) without rupture  Essential hypertension  Falls  TIA (transient ischemic attack)  H/O abdominal aortic aneurysm repair    SOCIAL HISTORY:  Negative for smoking/alcohol/drug use.     ALLERGIES:  Fosamax (Other (Moderate))  ibuprofen (Flushing)  Novacet (Rash)  penicillin (Rash)  Sulfac 10% (Rash)  Tylenol (Pruritus)    MEDICATIONS:  STANDING MEDICATIONS  amLODIPine   Tablet 5 milliGRAM(s) Oral daily  bisacodyl 5 milliGRAM(s) Oral at bedtime  chlorhexidine 4% Liquid 1 Application(s) Topical <User Schedule>  clopidogrel Tablet 75 milliGRAM(s) Oral daily  docusate sodium 100 milliGRAM(s) Oral three times a day  heparin  Injectable 5000 Unit(s) SubCutaneous every 12 hours  labetalol 200 milliGRAM(s) Oral every 12 hours  mirtazapine 7.5 milliGRAM(s) Oral at bedtime  senna 2 Tablet(s) Oral at bedtime    PRN MEDICATIONS  melatonin 3 milliGRAM(s) Oral at bedtime PRN  morphine  - Injectable 2 milliGRAM(s) IV Push every 4 hours PRN  ondansetron Injectable 4 milliGRAM(s) IV Push every 4 hours PRN  oxyCODONE    IR 5 milliGRAM(s) Oral every 4 hours PRN  oxyCODONE    IR 10 milliGRAM(s) Oral every 4 hours PRN    VITALS:   T(F): 96.6  HR: 62  BP: 144/69  RR: 18  SpO2: --    LABS:                        RADIOLOGY:    PHYSICAL EXAM:  GEN: No acute distress  LUNGS: Clear to auscultation bilaterally   HEART: S1/S2 present. RRR.   ABD: Soft, non-tender, non-distended. Bowel sounds present  EXT: NC/NC/NE/2+PP/FELICIANO/Skin Intact.   NEURO: AAOX3    Intravenous access:   NG tube:   Bustamante cathter: SUBJECTIVE:    Patient is a 83y old Female who presents with a chief complaint of hip fracture (12 Oct 2018 15:44)    Currently admitted to medicine with the primary diagnosis of Intertrochanteric fracture of right hip     Today is hospital day 2d. This morning she is resting comfortably in bed and reports no new issues or overnight events.     PAST MEDICAL & SURGICAL HISTORY  Hip fracture  Smoker  Polymyalgia rheumatica  Abdominal aortic aneurysm (AAA) without rupture  Essential hypertension  Falls  TIA (transient ischemic attack)  H/O abdominal aortic aneurysm repair    SOCIAL HISTORY:  Negative for smoking/alcohol/drug use.     ALLERGIES:  Fosamax (Other (Moderate))  ibuprofen (Flushing)  Novacet (Rash)  penicillin (Rash)  Sulfac 10% (Rash)  Tylenol (Pruritus)    MEDICATIONS:  STANDING MEDICATIONS  amLODIPine   Tablet 5 milliGRAM(s) Oral daily  bisacodyl 5 milliGRAM(s) Oral at bedtime  chlorhexidine 4% Liquid 1 Application(s) Topical <User Schedule>  clopidogrel Tablet 75 milliGRAM(s) Oral daily  docusate sodium 100 milliGRAM(s) Oral three times a day  heparin  Injectable 5000 Unit(s) SubCutaneous every 12 hours  labetalol 200 milliGRAM(s) Oral every 12 hours  mirtazapine 7.5 milliGRAM(s) Oral at bedtime  senna 2 Tablet(s) Oral at bedtime    PRN MEDICATIONS  melatonin 3 milliGRAM(s) Oral at bedtime PRN  morphine  - Injectable 2 milliGRAM(s) IV Push every 4 hours PRN  ondansetron Injectable 4 milliGRAM(s) IV Push every 4 hours PRN  oxyCODONE    IR 5 milliGRAM(s) Oral every 4 hours PRN  oxyCODONE    IR 10 milliGRAM(s) Oral every 4 hours PRN    VITALS:   T(F): 96.6  HR: 62  BP: 144/69  RR: 18  SpO2: --    LABS:                        RADIOLOGY:    PHYSICAL EXAM:  GEN: No acute distress  LUNGS: Clear to auscultation bilaterally   HEART: S1/S2 present. RRR.   ABD: Soft, non-tender, non-distended. Bowel sounds present  EXT: NC/NC/NE/2+PP/FELICIANO/Skin Intact.   NEURO: AAOX1

## 2018-10-14 NOTE — PROGRESS NOTE ADULT - SUBJECTIVE AND OBJECTIVE BOX
83 F s/p  Removal of hardware from right hip  Intramedullary nailing of right hip, pod5      Patient seen and examined at bedside. no complaints   NAD    RLE  Dressing  c/d/i   Compartments soft/compressible  Calves soft  EHL/FHL Motor intact  SILT distally  Ext wwp                                             A/P: 83yF S/p R Removal of hardware from hip, Intramedullary nailing of hip pod5        - PT- WBAT  -OOBTC  -DVT PPx  -Pain Control  -SCDs  -IS  -Continue Current Tx  -Dispo planning

## 2018-10-14 NOTE — PROGRESS NOTE ADULT - ASSESSMENT
82 F with pmhx of HTN, TIA, h/o AAA repair, polymyalgia rheumatica, with recent acute nondisplaced right inferior femoral neck fracture, and right superior acetabular stress fracture s/p R CRPP presenting for evaluation s/p mechanical fall last night. Patient states she had extensive physical therapy session at home and was exhausted. She was walking with walker tripped and fell onto her left side now c/o pain to right hip/RLE. No head injury or LOC. No cp, sob, fever, abdominal pain, nausea, vomiting, diarrhea, back pain, urinary symptoms, headache, dizziness, paresthesias, or weakness.      #Nondisplaced incomplete perihardware fracture involving the   intertrochanteric region   -s/p  Removal of hardware from right hip. Intramedullary nailing of right hip, pod2  -resume plavix 75mg qd     #)DANIEL on CKD III   -improving    #positive u/a  -f/u with ucx  -finished 3 day course of rocephin till 10/12    #HTN  -c/w amlodipine 5mg qd  -c/w labetalol 200mg BID    #AAA s/p repair   -c/w labetalol    #Constipation   -c/w senna, colace , miralax      DVT/GI ppx: heparin/protonix     Seen by physiatry - candidate for 4A rehab.  pending dispo 4a vs SNF 82 F with pmhx of HTN, TIA, h/o AAA repair, polymyalgia rheumatica, with recent acute nondisplaced right inferior femoral neck fracture, and right superior acetabular stress fracture s/p R CRPP presenting for evaluation s/p mechanical fall last night. Patient states she had extensive physical therapy session at home and was exhausted. She was walking with walker tripped and fell onto her left side now c/o pain to right hip/RLE. No head injury or LOC. No cp, sob, fever, abdominal pain, nausea, vomiting, diarrhea, back pain, urinary symptoms, headache, dizziness, paresthesias, or weakness.      #Nondisplaced incomplete perihardware fracture involving the   intertrochanteric region   -s/p  Removal of hardware from right hip. Intramedullary nailing of right hip, pod2  -resume plavix 75mg qd     #)DANIEL on CKD III   -resolved    #)Polymyalgia rheumatica   controlled       #positive u/a  -f/u with ucx  -finished 3 day course of rocephin till 10/12    #HTN  -c/w amlodipine 5mg qd  -c/w labetalol 200mg BID    #AAA s/p repair   -c/w labetalol    #Constipation   -c/w senna, colace , miralax      DVT/GI ppx: heparin/protonix     Seen by physiatry - candidate for 4A rehab.  pending dispo 4a vs SNF

## 2018-10-15 LAB
ANION GAP SERPL CALC-SCNC: 18 MMOL/L — HIGH (ref 7–14)
BASOPHILS # BLD AUTO: 0.03 K/UL — SIGNIFICANT CHANGE UP (ref 0–0.2)
BASOPHILS NFR BLD AUTO: 0.4 % — SIGNIFICANT CHANGE UP (ref 0–1)
BUN SERPL-MCNC: 26 MG/DL — HIGH (ref 10–20)
CALCIUM SERPL-MCNC: 9.4 MG/DL — SIGNIFICANT CHANGE UP (ref 8.5–10.1)
CHLORIDE SERPL-SCNC: 101 MMOL/L — SIGNIFICANT CHANGE UP (ref 98–110)
CO2 SERPL-SCNC: 19 MMOL/L — SIGNIFICANT CHANGE UP (ref 17–32)
CREAT SERPL-MCNC: 1.3 MG/DL — SIGNIFICANT CHANGE UP (ref 0.7–1.5)
EOSINOPHIL # BLD AUTO: 0.12 K/UL — SIGNIFICANT CHANGE UP (ref 0–0.7)
EOSINOPHIL NFR BLD AUTO: 1.8 % — SIGNIFICANT CHANGE UP (ref 0–8)
GLUCOSE SERPL-MCNC: 98 MG/DL — SIGNIFICANT CHANGE UP (ref 70–99)
HCT VFR BLD CALC: 36.4 % — LOW (ref 37–47)
HGB BLD-MCNC: 11.6 G/DL — LOW (ref 12–16)
IMM GRANULOCYTES NFR BLD AUTO: 1.2 % — HIGH (ref 0.1–0.3)
LYMPHOCYTES # BLD AUTO: 1.38 K/UL — SIGNIFICANT CHANGE UP (ref 1.2–3.4)
LYMPHOCYTES # BLD AUTO: 20.3 % — LOW (ref 20.5–51.1)
MCHC RBC-ENTMCNC: 28.6 PG — SIGNIFICANT CHANGE UP (ref 27–31)
MCHC RBC-ENTMCNC: 31.9 G/DL — LOW (ref 32–37)
MCV RBC AUTO: 89.7 FL — SIGNIFICANT CHANGE UP (ref 81–99)
MONOCYTES # BLD AUTO: 0.56 K/UL — SIGNIFICANT CHANGE UP (ref 0.1–0.6)
MONOCYTES NFR BLD AUTO: 8.2 % — SIGNIFICANT CHANGE UP (ref 1.7–9.3)
NEUTROPHILS # BLD AUTO: 4.64 K/UL — SIGNIFICANT CHANGE UP (ref 1.4–6.5)
NEUTROPHILS NFR BLD AUTO: 68.1 % — SIGNIFICANT CHANGE UP (ref 42.2–75.2)
NRBC # BLD: 0 /100 WBCS — SIGNIFICANT CHANGE UP (ref 0–0)
PLATELET # BLD AUTO: 246 K/UL — SIGNIFICANT CHANGE UP (ref 130–400)
POTASSIUM SERPL-MCNC: 4.4 MMOL/L — SIGNIFICANT CHANGE UP (ref 3.5–5)
POTASSIUM SERPL-SCNC: 4.4 MMOL/L — SIGNIFICANT CHANGE UP (ref 3.5–5)
RBC # BLD: 4.06 M/UL — LOW (ref 4.2–5.4)
RBC # FLD: 13.9 % — SIGNIFICANT CHANGE UP (ref 11.5–14.5)
SODIUM SERPL-SCNC: 138 MMOL/L — SIGNIFICANT CHANGE UP (ref 135–146)
WBC # BLD: 6.81 K/UL — SIGNIFICANT CHANGE UP (ref 4.8–10.8)
WBC # FLD AUTO: 6.81 K/UL — SIGNIFICANT CHANGE UP (ref 4.8–10.8)

## 2018-10-15 RX ADMIN — CLOPIDOGREL BISULFATE 75 MILLIGRAM(S): 75 TABLET, FILM COATED ORAL at 13:47

## 2018-10-15 RX ADMIN — Medication 100 MILLIGRAM(S): at 05:47

## 2018-10-15 RX ADMIN — CHLORHEXIDINE GLUCONATE 1 APPLICATION(S): 213 SOLUTION TOPICAL at 05:47

## 2018-10-15 RX ADMIN — Medication 100 MILLIGRAM(S): at 13:48

## 2018-10-15 RX ADMIN — HEPARIN SODIUM 5000 UNIT(S): 5000 INJECTION INTRAVENOUS; SUBCUTANEOUS at 05:46

## 2018-10-15 RX ADMIN — HEPARIN SODIUM 5000 UNIT(S): 5000 INJECTION INTRAVENOUS; SUBCUTANEOUS at 17:08

## 2018-10-15 RX ADMIN — Medication 5 MILLIGRAM(S): at 21:16

## 2018-10-15 RX ADMIN — MIRTAZAPINE 7.5 MILLIGRAM(S): 45 TABLET, ORALLY DISINTEGRATING ORAL at 21:15

## 2018-10-15 RX ADMIN — Medication 200 MILLIGRAM(S): at 05:47

## 2018-10-15 RX ADMIN — Medication 100 MILLIGRAM(S): at 21:16

## 2018-10-15 RX ADMIN — Medication 200 MILLIGRAM(S): at 17:07

## 2018-10-15 RX ADMIN — AMLODIPINE BESYLATE 5 MILLIGRAM(S): 2.5 TABLET ORAL at 05:46

## 2018-10-15 RX ADMIN — SENNA PLUS 2 TABLET(S): 8.6 TABLET ORAL at 21:16

## 2018-10-15 NOTE — PROGRESS NOTE ADULT - SUBJECTIVE AND OBJECTIVE BOX
MAURICIO KONG 83y Female  MRN#: [id number]       SUBJECTIVE  Patient is a 83y old Female who presents with a chief complaint of hip fracture (12 Oct 2018 15:44)    she is currently admitted to medicine with the primary diagnosis of Intertrochanteric fracture of right hip    Today is hospital day 10d, and this morning she is lying in bed without distress.     No acute overnight events.     OBJECTIVE  PAST MEDICAL & SURGICAL HISTORY  Hip fracture  Smoker  Polymyalgia rheumatica  Abdominal aortic aneurysm (AAA) without rupture  Essential hypertension  Falls  TIA (transient ischemic attack)  H/O abdominal aortic aneurysm repair    ALLERGIES:  Fosamax (Other (Moderate))  ibuprofen (Flushing)  Novacet (Rash)  penicillin (Rash)  Sulfac 10% (Rash)  Tylenol (Pruritus)    MEDICATIONS:  STANDING MEDICATIONS  amLODIPine   Tablet 5 milliGRAM(s) Oral daily  bisacodyl 5 milliGRAM(s) Oral at bedtime  chlorhexidine 4% Liquid 1 Application(s) Topical <User Schedule>  clopidogrel Tablet 75 milliGRAM(s) Oral daily  docusate sodium 100 milliGRAM(s) Oral three times a day  heparin  Injectable 5000 Unit(s) SubCutaneous every 12 hours  labetalol 200 milliGRAM(s) Oral every 12 hours  mirtazapine 7.5 milliGRAM(s) Oral at bedtime  senna 2 Tablet(s) Oral at bedtime    PRN MEDICATIONS  melatonin 3 milliGRAM(s) Oral at bedtime PRN  morphine  - Injectable 2 milliGRAM(s) IV Push every 4 hours PRN  ondansetron Injectable 4 milliGRAM(s) IV Push every 4 hours PRN  oxyCODONE    IR 5 milliGRAM(s) Oral every 4 hours PRN  oxyCODONE    IR 10 milliGRAM(s) Oral every 4 hours PRN    HOME MEDICATIONS  Home Medications:  amLODIPine 5 mg oral tablet: 1 tab(s) orally once a day (11 Oct 2018 09:06)  bisacodyl 5 mg oral delayed release tablet: 1 tab(s) orally once a day (at bedtime) (11 Oct 2018 09:06)  CLOPIDOGREL  TAB 75M  orally once a day (05 Oct 2018 18:21)  docusate sodium 100 mg oral capsule: 1 cap(s) orally 3 times a day (11 Oct 2018 09:06)  GABAPENTIN   /5ML: 5 milliliter(s) orally 4 times a day (05 Oct 2018 18:21)  labetalol 200 mg oral tablet: 1 tab(s) orally every 12 hours (11 Oct 2018 09:06)  LOSARTAN POT TAB 50M  orally once a day (05 Oct 2018 18:21)  melatonin 3 mg oral tablet: 1 tab(s) orally once a day (at bedtime), As needed, Insomnia (11 Oct 2018 09:06)  MIRTAZAPINE  TAB 7.5M  orally once a day (05 Oct 2018 18:21)  ondansetron 2 mg/mL injectable solution:  injectable  (11 Oct 2018 09:06)  oxyCODONE 10 mg oral tablet: 1 tab(s) orally every 4 hours, As needed, Moderate Pain (4 - 6) (11 Oct 2018 09:06)  oxyCODONE 5 mg oral tablet: 1 tab(s) orally every 4 hours, As needed, Mild Pain (1 - 3) (11 Oct 2018 09:06)  senna oral tablet: 2 tab(s) orally once a day (at bedtime) (11 Oct 2018 09:06)      VITAL SIGNS: Last 24 Hours  T(C): 36.3 (15 Oct 2018 07:18), Max: 36.3 (15 Oct 2018 07:18)  T(F): 97.3 (15 Oct 2018 07:18), Max: 97.3 (15 Oct 2018 07:18)  HR: 60 (15 Oct 2018 07:18) (55 - 63)  BP: 117/57 (15 Oct 2018 07:18) (117/57 - 147/66)  BP(mean): --  RR: 18 (15 Oct 2018 07:18) (18 - 18)  SpO2: --    LABS:                        11.6   6.81  )-----------( 246      ( 15 Oct 2018 05:51 )             36.4     10-15    138  |  101  |  26<H>  ----------------------------<  98  4.4   |  19  |  1.3    Ca    9.4      15 Oct 2018 05:51      CAPILLARY BLOOD GLUCOSE          RADIOLOGY:      PHYSICAL EXAM:    GENERAL: NAD, AAOx1  HEENT:  Atraumatic, Normocephalic.  PULMONARY: Clear to auscultation bilaterally  CARDIOVASCULAR: Regular rate and rhythm; No murmurs, rubs, or gallops  GASTROINTESTINAL: Soft, Nontender, Nondistended; Bowel sounds present  MUSCULOSKELETAL:  2+ Peripheral Pulses, No clubbing, cyanosis, or edema  NEUROLOGY: non-focal      ADMISSION SUMMARY  Patient is a 83y old Female who presents with a chief complaint of hip fracture (12 Oct 2018 15:44)     she currently admitted to medicine with the primary diagnosis of Intertrochanteric fracture of right hip MAURICIO KONG 83y Female  MRN#: [id number]       SUBJECTIVE  Patient is a 83y old Female who presents with a chief complaint of hip fracture (12 Oct 2018 15:44)    she is currently admitted to medicine with the primary diagnosis of Intertrochanteric fracture of right hip    Today is hospital day 10d, and this morning she is lying in bed without distress.     No acute overnight events.   no cp, sob, abd pain, fever  hip pain improved, sharp, mild, nonradiating.    OBJECTIVE  PAST MEDICAL & SURGICAL HISTORY  Hip fracture  Smoker  Polymyalgia rheumatica  Abdominal aortic aneurysm (AAA) without rupture  Essential hypertension  Falls  TIA (transient ischemic attack)  H/O abdominal aortic aneurysm repair    ALLERGIES:  Fosamax (Other (Moderate))  ibuprofen (Flushing)  Novacet (Rash)  penicillin (Rash)  Sulfac 10% (Rash)  Tylenol (Pruritus)    MEDICATIONS:  STANDING MEDICATIONS  amLODIPine   Tablet 5 milliGRAM(s) Oral daily  bisacodyl 5 milliGRAM(s) Oral at bedtime  chlorhexidine 4% Liquid 1 Application(s) Topical <User Schedule>  clopidogrel Tablet 75 milliGRAM(s) Oral daily  docusate sodium 100 milliGRAM(s) Oral three times a day  heparin  Injectable 5000 Unit(s) SubCutaneous every 12 hours  labetalol 200 milliGRAM(s) Oral every 12 hours  mirtazapine 7.5 milliGRAM(s) Oral at bedtime  senna 2 Tablet(s) Oral at bedtime    PRN MEDICATIONS  melatonin 3 milliGRAM(s) Oral at bedtime PRN  morphine  - Injectable 2 milliGRAM(s) IV Push every 4 hours PRN  ondansetron Injectable 4 milliGRAM(s) IV Push every 4 hours PRN  oxyCODONE    IR 5 milliGRAM(s) Oral every 4 hours PRN  oxyCODONE    IR 10 milliGRAM(s) Oral every 4 hours PRN    HOME MEDICATIONS  Home Medications:  amLODIPine 5 mg oral tablet: 1 tab(s) orally once a day (11 Oct 2018 09:06)  bisacodyl 5 mg oral delayed release tablet: 1 tab(s) orally once a day (at bedtime) (11 Oct 2018 09:06)  CLOPIDOGREL  TAB 75M  orally once a day (05 Oct 2018 18:21)  docusate sodium 100 mg oral capsule: 1 cap(s) orally 3 times a day (11 Oct 2018 09:06)  GABAPENTIN   /5ML: 5 milliliter(s) orally 4 times a day (05 Oct 2018 18:21)  labetalol 200 mg oral tablet: 1 tab(s) orally every 12 hours (11 Oct 2018 09:06)  LOSARTAN POT TAB 50M  orally once a day (05 Oct 2018 18:21)  melatonin 3 mg oral tablet: 1 tab(s) orally once a day (at bedtime), As needed, Insomnia (11 Oct 2018 09:06)  MIRTAZAPINE  TAB 7.5M  orally once a day (05 Oct 2018 18:21)  ondansetron 2 mg/mL injectable solution:  injectable  (11 Oct 2018 09:06)  oxyCODONE 10 mg oral tablet: 1 tab(s) orally every 4 hours, As needed, Moderate Pain (4 - 6) (11 Oct 2018 09:06)  oxyCODONE 5 mg oral tablet: 1 tab(s) orally every 4 hours, As needed, Mild Pain (1 - 3) (11 Oct 2018 09:06)  senna oral tablet: 2 tab(s) orally once a day (at bedtime) (11 Oct 2018 09:06)      VITAL SIGNS: Last 24 Hours  T(C): 36.3 (15 Oct 2018 07:18), Max: 36.3 (15 Oct 2018 07:18)  T(F): 97.3 (15 Oct 2018 07:18), Max: 97.3 (15 Oct 2018 07:18)  HR: 60 (15 Oct 2018 07:18) (55 - 63)  BP: 117/57 (15 Oct 2018 07:18) (117/57 - 147/66)  BP(mean): --  RR: 18 (15 Oct 2018 07:18) (18 - 18)  SpO2: --    LABS:                        11.6   6.81  )-----------( 246      ( 15 Oct 2018 05:51 )             36.4     10-15    138  |  101  |  26<H>  ----------------------------<  98  4.4   |  19  |  1.3    Ca    9.4      15 Oct 2018 05:51      CAPILLARY BLOOD GLUCOSE          RADIOLOGY:      PHYSICAL EXAM:    GENERAL: NAD, AAOx1  HEENT:  Atraumatic, Normocephalic.  PULMONARY: Clear to auscultation bilaterally  CARDIOVASCULAR: Regular rate and rhythm; No murmurs, rubs, or gallops  GASTROINTESTINAL: Soft, Nontender, Nondistended; Bowel sounds present  MUSCULOSKELETAL:  2+ Peripheral Pulses, No clubbing, cyanosis, or edema  NEUROLOGY: non-focal      ADMISSION SUMMARY  Patient is a 83y old Female who presents with a chief complaint of hip fracture (12 Oct 2018 15:44)     she currently admitted to medicine with the primary diagnosis of Intertrochanteric fracture of right hip

## 2018-10-15 NOTE — PROGRESS NOTE ADULT - ASSESSMENT
82 F with pmhx of HTN, TIA, h/o AAA repair, polymyalgia rheumatica, with recent acute nondisplaced right inferior femoral neck fracture, and right superior acetabular stress fracture s/p R CRPP presenting for evaluation s/p mechanical fall last night. Patient states she had extensive physical therapy session at home and was exhausted. She was walking with walker tripped and fell onto her left side now c/o pain to right hip/RLE. No head injury or LOC. No cp, sob, fever, abdominal pain, nausea, vomiting, diarrhea, back pain, urinary symptoms, headache, dizziness, paresthesias, or weakness. Pt is currently waiting for disposition to SNF vs 4A rehab.    #Nondisplaced incomplete perihardware fracture involving the intertrochanteric region   -s/p Removal of hardware from right hip. Intramedullary nailing of right hip, pod3  -c/w plavix 75mg qd     #DANIEL on CKD III   -resolved    #)Polymyalgia rheumatica   -controlled     #positive u/a  -f/u with [facility]  -finished 3 day course of rocephin till 10/12    #HTN  -c/w amlodipine 5mg qd  -c/w labetalol 200mg BID    #AAA s/p repair   -c/w labetalol    #Constipation   -c/w senna, colace , miralax    DVT/GI ppx: heparin/protonix     Seen by physiatry - candidate for 4A rehab.  pending dispo 4a vs SNF

## 2018-10-16 VITALS
RESPIRATION RATE: 18 BRPM | HEART RATE: 67 BPM | SYSTOLIC BLOOD PRESSURE: 136 MMHG | DIASTOLIC BLOOD PRESSURE: 67 MMHG | TEMPERATURE: 98 F

## 2018-10-16 RX ADMIN — CLOPIDOGREL BISULFATE 75 MILLIGRAM(S): 75 TABLET, FILM COATED ORAL at 14:48

## 2018-10-16 RX ADMIN — CHLORHEXIDINE GLUCONATE 1 APPLICATION(S): 213 SOLUTION TOPICAL at 05:12

## 2018-10-16 RX ADMIN — Medication 100 MILLIGRAM(S): at 05:14

## 2018-10-16 RX ADMIN — HEPARIN SODIUM 5000 UNIT(S): 5000 INJECTION INTRAVENOUS; SUBCUTANEOUS at 05:15

## 2018-10-16 RX ADMIN — Medication 200 MILLIGRAM(S): at 05:14

## 2018-10-16 RX ADMIN — AMLODIPINE BESYLATE 5 MILLIGRAM(S): 2.5 TABLET ORAL at 05:14

## 2018-10-16 NOTE — CHART NOTE - NSCHARTNOTEFT_GEN_A_CORE
Registered Dietitian Follow-Up     Patient Profile Reviewed                           Yes [v]   No []     Nutrition History Previously Obtained        Yes [v]  No []       Pertinent Subjective Information:     Pertinent Medical Interventions: S/p removal of hardware from right hip, intramedullary nailing of right hip POD #3. DANIEL on CKD III -resolved. Seen by Physiatry - candidate for 4A rehab, pending dispo 4A vs SNF.      Diet order: Regular + Ensure clear q 12 hrs      Anthropometrics:  - Ht.  - Wt. 57kg- no new weights   - %wt change  - BMI  - IBW     Pertinent Lab Data: 10/15 BUN- 26, GFR- 38, H/H- 11.6/36.4     Pertinent Meds: plavix, senna, colace, dulcolax, morphine, oxycodone,      Physical Findings:  - Appearance:  - GI function:  - Tubes:  - Oral/Mouth cavity:  - Skin: rt hip incision     Nutrition Requirements (Weight Used: 57kg) continue:     Estimated energy needs: 2048-7718 kcal/day (MSJ x 1.2-1.3)  Estimated protein needs: 57-68 g/day (1.0-1.2 g/kg of act wt)  Estimated fluid needs: 1ml/kcal      Nutrient Intake:        [v] Previous Nutrition Diagnosis: Inadequate oral intake            [] Ongoing          [] Resolved    [] No active nutrition diagnosis identified at this time     Nutrition Diagnostic #1  Problem:  Etiology:  Statement:     Nutrition Diagnostic #2  Problem:  Etiology:  Statement:     Nutrition Intervention      Goal/Expected Outcome:     Indicator/Monitoring: Registered Dietitian Follow-Up     Patient Profile Reviewed                           Yes [v]   No []     Nutrition History Previously Obtained        Yes [v]  No []       Pertinent Subjective Information: Pt reports fair appetite, dislikes Ensure Clear but willing to try Ensure Pudding (pt does not like liquid supplements) . BM x 1 today, 3 BMs  yesterday - pt requests to stop stool softeners.                                                                    Pertinent Medical Interventions: S/p removal of hardware from right hip, intramedullary nailing of right hip POD #3. DANIEL on CKD III -resolved. Seen by Physiatry - candidate for 4A rehab, pending dispo 4A vs SNF.      Diet order: Regular + Ensure clear q 12 hrs      Anthropometrics:  - Ht. 5'1"  - Wt. 57kg- no new weights, RD attempted to check pt's weight today however pt refused (she does not want to lay flat in bed 2/2 pain)  - %wt change  - BMI 23.7  -  lbs     Pertinent Lab Data: 10/15 BUN- 26, GFR- 38, H/H- 11.6/36.4     Pertinent Meds: plavix, senna, colace, dulcolax, morphine, oxycodone,      Physical Findings:  - Appearance: alert, seems confused at times  - GI function:  BM x 1 today, 3 BMs  yesterday  - Tubes:  - Oral/Mouth cavity:   - Skin: rt hip incision     Nutrition Requirements (Weight Used: 57kg) continue:     Estimated energy needs: 7208-8764 kcal/day (MSJ x 1.2-1.3)  Estimated protein needs: 57-68 g/day (1.0-1.2 g/kg of act wt)  Estimated fluid needs: 1ml/kcal      Nutrient Intake: Pt reports fair appetite, eating ~50%trays. Documented PO 15-75%. dislikes Ensure Clear but willing to try Ensure Pudding        [v] Previous Nutrition Diagnosis: Inadequate oral intake            [v] Ongoing          [] Resolved      Nutrition Intervention: Meal and snacks. Supplements.      Goal/Expected Outcome: In 3 days: 1. PO intake > 75% meals, snacks and PO supplements. 2. Regular BMS (no more than 1x/day). 3. Labs trending WDL      Indicator/Monitoring: Will monitor diet order, energy intake, labs, body composition and NFPF.     Recommended: Please continue current diet (dietary restrictions not warranted at this time), discontinue Ensure Clear, add Ensure Pudding q 8 hrs (510kcal, 12 gm protein), discontinue stool softeners, encourage at meal times.      At risk f/u.

## 2018-10-16 NOTE — PROGRESS NOTE ADULT - ATTENDING COMMENTS
83yFemale  No Acute Events    T(C): 36.1 (10-13-18 @ 15:50), Max: 36.3 (10-12-18 @ 23:30)  HR: 60 (10-13-18 @ 18:02) (60 - 68)  BP: 148/70 (10-13-18 @ 18:02) (139/72 - 148/70)  RR: 18 (10-13-18 @ 15:50) (18 - 18)  SpO2: --    PE  NAD  Compartments soft, NT  NVI  clean/dry/intact    Plan  - DVT PPx  - IS  - SCD  - PT  - Pain Control
pt s&e  s/p ORIF  doing well  dressing changed  PT  WBAT  IS  DVT proph
pt seen and examined  wbat  pain control  dvt ppx
agree with above  spoke to family about nonop and op options, they wish to proceed with revision hip surgery  will plan to revise tomorrow, please provide appropriate clearances for surgery
discharge planning  possible 4a
pain control  PT  discharge planning
pending 4A
#preop clearance  METS>= 4; RCRI 1  medically optimized for surgical procedure
discharge planning
discharge planning
patient seen and examined , agree with pgy 1 assesment and plan except as indicated above,   GEN Lying in no acute distress  HEENT Pupils equal and reactive to light and accommodationSupple Neck  PULM Clear to auscultation bilaterally  CV s1s2 regular rate and rhythm  GI + bowel sounds nontnender  EXT no cyanosis or edema  PSYCH awake alert and oriented x 3  INTEG No Lesions  NEURO FELICIANO    #Nondisplaced incomplete perihardware fracture involving the   intertrochanteric region  pt rehab  pain control  awaiting placement for rehab at [facility]    #)DANIEL on CKD III   -resolved   #)Assymptomatic bacteruria   finished course of antibiotics    #HTN  -c/w amlodipine 5mg qd  -c/w labetalol 200mg BID    #AAA s/p repair   -c/w labetalol    #Constipation   -c/w senna, colace , miralax    #Anemia no signs of acute blood loss. no need for transfusion

## 2018-10-16 NOTE — PROGRESS NOTE ADULT - ASSESSMENT
82 F with pmhx of HTN, TIA, h/o AAA repair, polymyalgia rheumatica, with recent acute nondisplaced right inferior femoral neck fracture, and right superior acetabular stress fracture s/p R CRPP presenting for evaluation s/p mechanical fall last night. Patient states she had extensive physical therapy session at home and was exhausted. She was walking with walker tripped and fell onto her left side now c/o pain to right hip/RLE. No head injury or LOC. No cp, sob, fever, abdominal pain, nausea, vomiting, diarrhea, back pain, urinary symptoms, headache, dizziness, paresthesias, or weakness. Pt is still awaiting for disposition to SNF vs 4A rehab.    #Nondisplaced incomplete perihardware fracture involving the intertrochanteric region   -s/p Removal of hardware from right hip. Intramedullary nailing of right hip, pod3  -c/w plavix 75mg qd     #DANIEL on CKD III   -resolved    #)Polymyalgia rheumatica   -controlled     #positive u/a - resolved  -Culture Results: 50,000 - 99,000 CFU/mL Escherichia coli (10.09.18 @ 08:48)  -finished 3 day course of rocephin till 10/12    #HTN  -c/w amlodipine 5mg qd  -c/w labetalol 200mg BID    #AAA s/p repair   -c/w labetalol    #Constipation   -c/w senna, colace , miralax    DVT/GI ppx: heparin/protonix     Seen by physiatry - candidate for 4A rehab.  pending dispo 4a vs SNF

## 2018-10-16 NOTE — PROGRESS NOTE ADULT - SUBJECTIVE AND OBJECTIVE BOX
MAURICIO KONG 83y Female  MRN#: [id number]       SUBJECTIVE  Patient is a 83y old Female who presents with a chief complaint of Rt hip fracture (15 Oct 2018 11:25)    she is currently admitted to medicine with the primary diagnosis of Intertrochanteric fracture of right hip    Today is hospital day 11d, and this morning she is lying in bed without distress.    No acute overnight events.     OBJECTIVE  PAST MEDICAL & SURGICAL HISTORY  Hip fracture  Smoker  Polymyalgia rheumatica  Abdominal aortic aneurysm (AAA) without rupture  Essential hypertension  Falls  TIA (transient ischemic attack)  H/O abdominal aortic aneurysm repair    ALLERGIES:  Fosamax (Other (Moderate))  ibuprofen (Flushing)  Novacet (Rash)  penicillin (Rash)  Sulfac 10% (Rash)  Tylenol (Pruritus)    MEDICATIONS:  STANDING MEDICATIONS  amLODIPine   Tablet 5 milliGRAM(s) Oral daily  bisacodyl 5 milliGRAM(s) Oral at bedtime  chlorhexidine 4% Liquid 1 Application(s) Topical <User Schedule>  clopidogrel Tablet 75 milliGRAM(s) Oral daily  docusate sodium 100 milliGRAM(s) Oral three times a day  heparin  Injectable 5000 Unit(s) SubCutaneous every 12 hours  labetalol 200 milliGRAM(s) Oral every 12 hours  mirtazapine 7.5 milliGRAM(s) Oral at bedtime  senna 2 Tablet(s) Oral at bedtime    PRN MEDICATIONS  melatonin 3 milliGRAM(s) Oral at bedtime PRN  morphine  - Injectable 2 milliGRAM(s) IV Push every 4 hours PRN  ondansetron Injectable 4 milliGRAM(s) IV Push every 4 hours PRN  oxyCODONE    IR 5 milliGRAM(s) Oral every 4 hours PRN  oxyCODONE    IR 10 milliGRAM(s) Oral every 4 hours PRN    HOME MEDICATIONS  Home Medications:  amLODIPine 5 mg oral tablet: 1 tab(s) orally once a day (11 Oct 2018 09:06)  bisacodyl 5 mg oral delayed release tablet: 1 tab(s) orally once a day (at bedtime) (11 Oct 2018 09:06)  CLOPIDOGREL  TAB 75M  orally once a day (05 Oct 2018 18:21)  docusate sodium 100 mg oral capsule: 1 cap(s) orally 3 times a day (11 Oct 2018 09:06)  GABAPENTIN   /5ML: 5 milliliter(s) orally 4 times a day (05 Oct 2018 18:21)  labetalol 200 mg oral tablet: 1 tab(s) orally every 12 hours (11 Oct 2018 09:06)  LOSARTAN POT TAB 50M  orally once a day (05 Oct 2018 18:21)  melatonin 3 mg oral tablet: 1 tab(s) orally once a day (at bedtime), As needed, Insomnia (11 Oct 2018 09:06)  MIRTAZAPINE  TAB 7.5M  orally once a day (05 Oct 2018 18:21)  ondansetron 2 mg/mL injectable solution:  injectable  (11 Oct 2018 09:06)  oxyCODONE 10 mg oral tablet: 1 tab(s) orally every 4 hours, As needed, Moderate Pain (4 - 6) (11 Oct 2018 09:06)  oxyCODONE 5 mg oral tablet: 1 tab(s) orally every 4 hours, As needed, Mild Pain (1 - 3) (11 Oct 2018 09:06)  senna oral tablet: 2 tab(s) orally once a day (at bedtime) (11 Oct 2018 09:06)      VITAL SIGNS: Last 24 Hours  T(C): 36.3 (16 Oct 2018 07:32), Max: 36.3 (16 Oct 2018 07:32)  T(F): 97.4 (16 Oct 2018 07:32), Max: 97.4 (16 Oct 2018 07:32)  HR: 56 (16 Oct 2018 07:32) (52 - 59)  BP: 118/57 (16 Oct 2018 07:32) (118/57 - 149/70)  BP(mean): --  RR: 18 (16 Oct 2018 07:32) (18 - 18)  SpO2: --    LABS:                        11.6   6.81  )-----------( 246      ( 15 Oct 2018 05:51 )             36.4     10-15    138  |  101  |  26<H>  ----------------------------<  98  4.4   |  19  |  1.3    Ca    9.4      15 Oct 2018 05:51    CAPILLARY BLOOD GLUCOSE          RADIOLOGY:      PHYSICAL EXAM:    GENERAL: NAD, AAOx2  HEENT:  Atraumatic, Normocephalic.  PULMONARY: Clear to auscultation bilaterally  CARDIOVASCULAR: Regular rate and rhythm; No murmurs, rubs, or gallops  GASTROINTESTINAL: Soft, Nontender, Nondistended; Bowel sounds present  MUSCULOSKELETAL:  2+ Peripheral Pulses, No clubbing, cyanosis, or edema  NEUROLOGY: non-focal    ADMISSION SUMMARY  Patient is a 83y old Female who presents with a chief complaint of Rt hip fracture (15 Oct 2018 11:25)     she currently admitted to medicine with the primary diagnosis of Intertrochanteric fracture of right hip MAURICIO KONG 83y Female  MRN#: [id number]       SUBJECTIVE  Patient is a 83y old Female who presents with a chief complaint of Rt hip fracture (15 Oct 2018 11:25)    she is currently admitted to medicine with the primary diagnosis of Intertrochanteric fracture of right hip    Today is hospital day 11d, and this morning she is lying in bed without distress.    No acute overnight events.   R hip pain, sharp, nonradiating, worsen with palpation  no cp, sob, abd pain, fever    OBJECTIVE  PAST MEDICAL & SURGICAL HISTORY  Hip fracture  Smoker  Polymyalgia rheumatica  Abdominal aortic aneurysm (AAA) without rupture  Essential hypertension  Falls  TIA (transient ischemic attack)  H/O abdominal aortic aneurysm repair    ALLERGIES:  Fosamax (Other (Moderate))  ibuprofen (Flushing)  Novacet (Rash)  penicillin (Rash)  Sulfac 10% (Rash)  Tylenol (Pruritus)    MEDICATIONS:  STANDING MEDICATIONS  amLODIPine   Tablet 5 milliGRAM(s) Oral daily  bisacodyl 5 milliGRAM(s) Oral at bedtime  chlorhexidine 4% Liquid 1 Application(s) Topical <User Schedule>  clopidogrel Tablet 75 milliGRAM(s) Oral daily  docusate sodium 100 milliGRAM(s) Oral three times a day  heparin  Injectable 5000 Unit(s) SubCutaneous every 12 hours  labetalol 200 milliGRAM(s) Oral every 12 hours  mirtazapine 7.5 milliGRAM(s) Oral at bedtime  senna 2 Tablet(s) Oral at bedtime    PRN MEDICATIONS  melatonin 3 milliGRAM(s) Oral at bedtime PRN  morphine  - Injectable 2 milliGRAM(s) IV Push every 4 hours PRN  ondansetron Injectable 4 milliGRAM(s) IV Push every 4 hours PRN  oxyCODONE    IR 5 milliGRAM(s) Oral every 4 hours PRN  oxyCODONE    IR 10 milliGRAM(s) Oral every 4 hours PRN    HOME MEDICATIONS  Home Medications:  amLODIPine 5 mg oral tablet: 1 tab(s) orally once a day (11 Oct 2018 09:06)  bisacodyl 5 mg oral delayed release tablet: 1 tab(s) orally once a day (at bedtime) (11 Oct 2018 09:06)  CLOPIDOGREL  TAB 75M  orally once a day (05 Oct 2018 18:21)  docusate sodium 100 mg oral capsule: 1 cap(s) orally 3 times a day (11 Oct 2018 09:06)  GABAPENTIN   /5ML: 5 milliliter(s) orally 4 times a day (05 Oct 2018 18:21)  labetalol 200 mg oral tablet: 1 tab(s) orally every 12 hours (11 Oct 2018 09:06)  LOSARTAN POT TAB 50M  orally once a day (05 Oct 2018 18:21)  melatonin 3 mg oral tablet: 1 tab(s) orally once a day (at bedtime), As needed, Insomnia (11 Oct 2018 09:06)  MIRTAZAPINE  TAB 7.5M  orally once a day (05 Oct 2018 18:21)  ondansetron 2 mg/mL injectable solution:  injectable  (11 Oct 2018 09:06)  oxyCODONE 10 mg oral tablet: 1 tab(s) orally every 4 hours, As needed, Moderate Pain (4 - 6) (11 Oct 2018 09:06)  oxyCODONE 5 mg oral tablet: 1 tab(s) orally every 4 hours, As needed, Mild Pain (1 - 3) (11 Oct 2018 09:)  senna oral tablet: 2 tab(s) orally once a day (at bedtime) (11 Oct 2018 09:06)      VITAL SIGNS: Last 24 Hours  T(C): 36.3 (16 Oct 2018 07:32), Max: 36.3 (16 Oct 2018 07:32)  T(F): 97.4 (16 Oct 2018 07:32), Max: 97.4 (16 Oct 2018 07:32)  HR: 56 (16 Oct 2018 07:32) (52 - 59)  BP: 118/57 (16 Oct 2018 07:32) (118/57 - 149/70)  BP(mean): --  RR: 18 (16 Oct 2018 07:32) (18 - 18)  SpO2: --    LABS:                        11.6   6.81  )-----------( 246      ( 15 Oct 2018 05:51 )             36.4     10-15    138  |  101  |  26<H>  ----------------------------<  98  4.4   |  19  |  1.3    Ca    9.4      15 Oct 2018 05:51    CAPILLARY BLOOD GLUCOSE          RADIOLOGY:      PHYSICAL EXAM:    GENERAL: NAD, AAOx2  HEENT:  Atraumatic, Normocephalic.  PULMONARY: Clear to auscultation bilaterally  CARDIOVASCULAR: Regular rate and rhythm; No murmurs, rubs, or gallops  GASTROINTESTINAL: Soft, Nontender, Nondistended; Bowel sounds present  MUSCULOSKELETAL:  2+ Peripheral Pulses, No clubbing, cyanosis, or edema  NEUROLOGY: non-focal    ADMISSION SUMMARY  Patient is a 83y old Female who presents with a chief complaint of Rt hip fracture (15 Oct 2018 11:25)     she currently admitted to medicine with the primary diagnosis of Intertrochanteric fracture of right hip

## 2018-10-16 NOTE — CHART NOTE - NSCHARTNOTEFT_GEN_A_CORE
<<<RESIDENT DISCHARGE NOTE>>>     MAURICIO KONG  MRN-[id number]    VITAL SIGNS:  T(F): 97.4 (10-16-18 @ 07:32), Max: 97.4 (10-16-18 @ 07:32)  HR: 56 (10-16-18 @ 07:32)  BP: 118/57 (10-16-18 @ 07:32)  SpO2: --      PHYSICAL EXAMINATION:  GENERAL: NAD, AAOx2  HEENT:  Atraumatic, Normocephalic.  PULMONARY: Clear to auscultation bilaterally  CARDIOVASCULAR: Regular rate and rhythm; No murmurs, rubs, or gallops  GASTROINTESTINAL: Soft, Nontender, Nondistended; Bowel sounds present  MUSCULOSKELETAL:  2+ Peripheral Pulses, No clubbing, cyanosis, or edema  NEUROLOGY: non-focal    TEST RESULTS:                        11.6   6.81  )-----------( 246      ( 15 Oct 2018 05:51 )             36.4       10-15    138  |  101  |  26<H>  ----------------------------<  98  4.4   |  19  |  1.3    Ca    9.4      15 Oct 2018 05:51    DISCHARGE MEDICATION RECONCILIATION  reviewed with Attending (Name: Dr. Reyes)    DISPOSITION:   [  ] Home,    [  ] Home with Visiting Nursing Services,   [X]  SNF/ NH,    [   ] Acute Rehab (4A),   [   ] Other (Specify:_________)

## 2018-10-19 ENCOUNTER — OUTPATIENT (OUTPATIENT)
Dept: OUTPATIENT SERVICES | Facility: HOSPITAL | Age: 83
LOS: 1 days | Discharge: HOME | End: 2018-10-19

## 2018-10-19 DIAGNOSIS — Z98.890 OTHER SPECIFIED POSTPROCEDURAL STATES: Chronic | ICD-10-CM

## 2018-10-20 PROBLEM — S72.009A FRACTURE OF UNSPECIFIED PART OF NECK OF UNSPECIFIED FEMUR, INITIAL ENCOUNTER FOR CLOSED FRACTURE: Chronic | Status: ACTIVE | Noted: 2018-10-05

## 2018-10-23 DIAGNOSIS — R19.5 OTHER FECAL ABNORMALITIES: ICD-10-CM
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
## 2018-10-25 DIAGNOSIS — N17.9 ACUTE KIDNEY FAILURE, UNSPECIFIED: ICD-10-CM

## 2018-10-25 DIAGNOSIS — F17.200 NICOTINE DEPENDENCE, UNSPECIFIED, UNCOMPLICATED: ICD-10-CM

## 2018-10-25 DIAGNOSIS — N39.0 URINARY TRACT INFECTION, SITE NOT SPECIFIED: ICD-10-CM

## 2018-10-25 DIAGNOSIS — S72.141A DISPLACED INTERTROCHANTERIC FRACTURE OF RIGHT FEMUR, INITIAL ENCOUNTER FOR CLOSED FRACTURE: ICD-10-CM

## 2018-10-25 DIAGNOSIS — M35.3 POLYMYALGIA RHEUMATICA: ICD-10-CM

## 2018-10-25 DIAGNOSIS — Z88.2 ALLERGY STATUS TO SULFONAMIDES: ICD-10-CM

## 2018-10-25 DIAGNOSIS — M97.01XA PERIPROSTHETIC FRACTURE AROUND INTERNAL PROSTHETIC RIGHT HIP JOINT, INITIAL ENCOUNTER: ICD-10-CM

## 2018-10-25 DIAGNOSIS — N18.3 CHRONIC KIDNEY DISEASE, STAGE 3 (MODERATE): ICD-10-CM
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
## 2018-10-25 DIAGNOSIS — Z86.73 PERSONAL HISTORY OF TRANSIENT ISCHEMIC ATTACK (TIA), AND CEREBRAL INFARCTION WITHOUT RESIDUAL DEFICITS: ICD-10-CM

## 2018-10-25 DIAGNOSIS — I12.9 HYPERTENSIVE CHRONIC KIDNEY DISEASE WITH STAGE 1 THROUGH STAGE 4 CHRONIC KIDNEY DISEASE, OR UNSPECIFIED CHRONIC KIDNEY DISEASE: ICD-10-CM

## 2018-10-25 DIAGNOSIS — I10 ESSENTIAL (PRIMARY) HYPERTENSION: ICD-10-CM

## 2018-10-25 DIAGNOSIS — Z88.0 ALLERGY STATUS TO PENICILLIN: ICD-10-CM

## 2018-10-27 ENCOUNTER — OUTPATIENT (OUTPATIENT)
Dept: OUTPATIENT SERVICES | Facility: HOSPITAL | Age: 83
LOS: 1 days | Discharge: HOME | End: 2018-10-27

## 2018-10-27 DIAGNOSIS — Z98.890 OTHER SPECIFIED POSTPROCEDURAL STATES: Chronic | ICD-10-CM

## 2018-10-29 ENCOUNTER — OUTPATIENT (OUTPATIENT)
Dept: OUTPATIENT SERVICES | Facility: HOSPITAL | Age: 83
LOS: 1 days | Discharge: HOME | End: 2018-10-29

## 2018-10-29 DIAGNOSIS — Z98.890 OTHER SPECIFIED POSTPROCEDURAL STATES: Chronic | ICD-10-CM

## 2018-10-29 DIAGNOSIS — I10 ESSENTIAL (PRIMARY) HYPERTENSION: ICD-10-CM

## 2018-10-30 DIAGNOSIS — N39.0 URINARY TRACT INFECTION, SITE NOT SPECIFIED: ICD-10-CM

## 2018-10-30 DIAGNOSIS — D64.9 ANEMIA, UNSPECIFIED: ICD-10-CM

## 2018-10-30 DIAGNOSIS — I10 ESSENTIAL (PRIMARY) HYPERTENSION: ICD-10-CM

## 2018-11-14 ENCOUNTER — OUTPATIENT (OUTPATIENT)
Dept: OUTPATIENT SERVICES | Facility: HOSPITAL | Age: 83
LOS: 1 days | Discharge: HOME | End: 2018-11-14

## 2018-11-14 DIAGNOSIS — Z98.890 OTHER SPECIFIED POSTPROCEDURAL STATES: Chronic | ICD-10-CM

## 2018-11-19 DIAGNOSIS — D64.9 ANEMIA, UNSPECIFIED: ICD-10-CM

## 2018-11-19 DIAGNOSIS — R71.8 OTHER ABNORMALITY OF RED BLOOD CELLS: ICD-10-CM

## 2018-11-19 DIAGNOSIS — I10 ESSENTIAL (PRIMARY) HYPERTENSION: ICD-10-CM

## 2018-11-19 DIAGNOSIS — R19.5 OTHER FECAL ABNORMALITIES: ICD-10-CM

## 2018-11-30 ENCOUNTER — OUTPATIENT (OUTPATIENT)
Dept: OUTPATIENT SERVICES | Facility: HOSPITAL | Age: 83
LOS: 1 days | Discharge: HOME | End: 2018-11-30

## 2018-11-30 DIAGNOSIS — Z98.890 OTHER SPECIFIED POSTPROCEDURAL STATES: Chronic | ICD-10-CM

## 2018-11-30 DIAGNOSIS — R19.7 DIARRHEA, UNSPECIFIED: ICD-10-CM

## 2019-06-12 ENCOUNTER — INPATIENT (INPATIENT)
Facility: HOSPITAL | Age: 84
LOS: 5 days | End: 2019-06-18
Attending: INTERNAL MEDICINE | Admitting: INTERNAL MEDICINE
Payer: MEDICARE

## 2019-06-12 VITALS
RESPIRATION RATE: 22 BRPM | SYSTOLIC BLOOD PRESSURE: 90 MMHG | TEMPERATURE: 98 F | HEART RATE: 81 BPM | DIASTOLIC BLOOD PRESSURE: 56 MMHG | OXYGEN SATURATION: 79 %

## 2019-06-12 DIAGNOSIS — R63.6 UNDERWEIGHT: ICD-10-CM

## 2019-06-12 DIAGNOSIS — J91.0 MALIGNANT PLEURAL EFFUSION: ICD-10-CM

## 2019-06-12 DIAGNOSIS — J98.11 ATELECTASIS: ICD-10-CM

## 2019-06-12 DIAGNOSIS — M48.54XA COLLAPSED VERTEBRA, NOT ELSEWHERE CLASSIFIED, THORACIC REGION, INITIAL ENCOUNTER FOR FRACTURE: ICD-10-CM

## 2019-06-12 DIAGNOSIS — F17.210 NICOTINE DEPENDENCE, CIGARETTES, UNCOMPLICATED: ICD-10-CM

## 2019-06-12 DIAGNOSIS — N30.00 ACUTE CYSTITIS WITHOUT HEMATURIA: ICD-10-CM

## 2019-06-12 DIAGNOSIS — Z51.5 ENCOUNTER FOR PALLIATIVE CARE: ICD-10-CM
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
## 2019-06-12 DIAGNOSIS — R59.0 LOCALIZED ENLARGED LYMPH NODES: ICD-10-CM
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
## 2019-06-12 DIAGNOSIS — G93.41 METABOLIC ENCEPHALOPATHY: ICD-10-CM
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
## 2019-06-12 DIAGNOSIS — N18.3 CHRONIC KIDNEY DISEASE, STAGE 3 (MODERATE): ICD-10-CM

## 2019-06-12 DIAGNOSIS — C79.51 SECONDARY MALIGNANT NEOPLASM OF BONE: ICD-10-CM

## 2019-06-12 DIAGNOSIS — E87.2 ACIDOSIS: ICD-10-CM

## 2019-06-12 DIAGNOSIS — M35.3 POLYMYALGIA RHEUMATICA: ICD-10-CM

## 2019-06-12 DIAGNOSIS — I12.9 HYPERTENSIVE CHRONIC KIDNEY DISEASE WITH STAGE 1 THROUGH STAGE 4 CHRONIC KIDNEY DISEASE, OR UNSPECIFIED CHRONIC KIDNEY DISEASE: ICD-10-CM

## 2019-06-12 DIAGNOSIS — N17.0 ACUTE KIDNEY FAILURE WITH TUBULAR NECROSIS: ICD-10-CM

## 2019-06-12 DIAGNOSIS — S22.31XA FRACTURE OF ONE RIB, RIGHT SIDE, INITIAL ENCOUNTER FOR CLOSED FRACTURE: ICD-10-CM

## 2019-06-12 DIAGNOSIS — R62.7 ADULT FAILURE TO THRIVE: ICD-10-CM

## 2019-06-12 DIAGNOSIS — Z66 DO NOT RESUSCITATE: ICD-10-CM

## 2019-06-12 DIAGNOSIS — J90 PLEURAL EFFUSION, NOT ELSEWHERE CLASSIFIED: ICD-10-CM

## 2019-06-12 DIAGNOSIS — C78.7 SECONDARY MALIGNANT NEOPLASM OF LIVER AND INTRAHEPATIC BILE DUCT: ICD-10-CM
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
## 2019-06-12 DIAGNOSIS — Z98.890 OTHER SPECIFIED POSTPROCEDURAL STATES: Chronic | ICD-10-CM

## 2019-06-12 DIAGNOSIS — J43.2 CENTRILOBULAR EMPHYSEMA: ICD-10-CM

## 2019-06-12 DIAGNOSIS — E87.5 HYPERKALEMIA: ICD-10-CM

## 2019-06-12 DIAGNOSIS — B96.20 UNSPECIFIED ESCHERICHIA COLI [E. COLI] AS THE CAUSE OF DISEASES CLASSIFIED ELSEWHERE: ICD-10-CM

## 2019-06-12 DIAGNOSIS — I21.A1 MYOCARDIAL INFARCTION TYPE 2: ICD-10-CM

## 2019-06-12 DIAGNOSIS — C34.90 MALIGNANT NEOPLASM OF UNSPECIFIED PART OF UNSPECIFIED BRONCHUS OR LUNG: ICD-10-CM

## 2019-06-12 DIAGNOSIS — E83.52 HYPERCALCEMIA: ICD-10-CM

## 2019-06-12 LAB
ALBUMIN SERPL ELPH-MCNC: 3.5 G/DL — SIGNIFICANT CHANGE UP (ref 3.5–5.2)
ALP SERPL-CCNC: 192 U/L — HIGH (ref 30–115)
ALT FLD-CCNC: 48 U/L — HIGH (ref 0–41)
ANION GAP SERPL CALC-SCNC: 25 MMOL/L — HIGH (ref 7–14)
AST SERPL-CCNC: 94 U/L — HIGH (ref 0–41)
BASE EXCESS BLDV CALC-SCNC: -13.8 MMOL/L — LOW (ref -2–2)
BASOPHILS # BLD AUTO: 0.04 K/UL — SIGNIFICANT CHANGE UP (ref 0–0.2)
BASOPHILS NFR BLD AUTO: 0.5 % — SIGNIFICANT CHANGE UP (ref 0–1)
BILIRUB SERPL-MCNC: 0.4 MG/DL — SIGNIFICANT CHANGE UP (ref 0.2–1.2)
BUN SERPL-MCNC: 89 MG/DL — CRITICAL HIGH (ref 10–20)
CA-I SERPL-SCNC: 1.43 MMOL/L — HIGH (ref 1.12–1.3)
CALCIUM SERPL-MCNC: 10.4 MG/DL — HIGH (ref 8.5–10.1)
CHLORIDE SERPL-SCNC: 105 MMOL/L — SIGNIFICANT CHANGE UP (ref 98–110)
CK SERPL-CCNC: 296 U/L — HIGH (ref 0–225)
CO2 SERPL-SCNC: 12 MMOL/L — LOW (ref 17–32)
CREAT SERPL-MCNC: 3.7 MG/DL — HIGH (ref 0.7–1.5)
EOSINOPHIL # BLD AUTO: 0.07 K/UL — SIGNIFICANT CHANGE UP (ref 0–0.7)
EOSINOPHIL NFR BLD AUTO: 0.8 % — SIGNIFICANT CHANGE UP (ref 0–8)
GAS PNL BLDV: 140 MMOL/L — SIGNIFICANT CHANGE UP (ref 136–145)
GAS PNL BLDV: SIGNIFICANT CHANGE UP
GLUCOSE SERPL-MCNC: 60 MG/DL — LOW (ref 70–99)
HCO3 BLDV-SCNC: 14 MMOL/L — LOW (ref 22–29)
HCT VFR BLD CALC: 39.1 % — SIGNIFICANT CHANGE UP (ref 37–47)
HCT VFR BLDA CALC: 36.3 % — SIGNIFICANT CHANGE UP (ref 34–44)
HGB BLD CALC-MCNC: 11.8 G/DL — LOW (ref 14–18)
HGB BLD-MCNC: 12.1 G/DL — SIGNIFICANT CHANGE UP (ref 12–16)
IMM GRANULOCYTES NFR BLD AUTO: 1.5 % — HIGH (ref 0.1–0.3)
LACTATE BLDV-MCNC: 2.1 MMOL/L — HIGH (ref 0.5–1.6)
LACTATE SERPL-SCNC: 1.9 MMOL/L — SIGNIFICANT CHANGE UP (ref 0.5–2.2)
LIDOCAIN IGE QN: 38 U/L — SIGNIFICANT CHANGE UP (ref 7–60)
LYMPHOCYTES # BLD AUTO: 1.34 K/UL — SIGNIFICANT CHANGE UP (ref 1.2–3.4)
LYMPHOCYTES # BLD AUTO: 16 % — LOW (ref 20.5–51.1)
MAGNESIUM SERPL-MCNC: 2.6 MG/DL — HIGH (ref 1.8–2.4)
MCHC RBC-ENTMCNC: 29.5 PG — SIGNIFICANT CHANGE UP (ref 27–31)
MCHC RBC-ENTMCNC: 30.9 G/DL — LOW (ref 32–37)
MCV RBC AUTO: 95.4 FL — SIGNIFICANT CHANGE UP (ref 81–99)
MONOCYTES # BLD AUTO: 0.65 K/UL — HIGH (ref 0.1–0.6)
MONOCYTES NFR BLD AUTO: 7.7 % — SIGNIFICANT CHANGE UP (ref 1.7–9.3)
NEUTROPHILS # BLD AUTO: 6.16 K/UL — SIGNIFICANT CHANGE UP (ref 1.4–6.5)
NEUTROPHILS NFR BLD AUTO: 73.5 % — SIGNIFICANT CHANGE UP (ref 42.2–75.2)
NRBC # BLD: 0 /100 WBCS — SIGNIFICANT CHANGE UP (ref 0–0)
NT-PROBNP SERPL-SCNC: 6169 PG/ML — HIGH (ref 0–300)
PCO2 BLDV: 38 MMHG — LOW (ref 41–51)
PH BLDV: 7.17 — LOW (ref 7.26–7.43)
PLATELET # BLD AUTO: 218 K/UL — SIGNIFICANT CHANGE UP (ref 130–400)
PO2 BLDV: 45 MMHG — HIGH (ref 20–40)
POTASSIUM BLDV-SCNC: 5.3 MMOL/L — SIGNIFICANT CHANGE UP (ref 3.3–5.6)
POTASSIUM SERPL-MCNC: 8.7 MMOL/L — CRITICAL HIGH (ref 3.5–5)
POTASSIUM SERPL-SCNC: 8.7 MMOL/L — CRITICAL HIGH (ref 3.5–5)
PROT SERPL-MCNC: 6.4 G/DL — SIGNIFICANT CHANGE UP (ref 6–8)
RBC # BLD: 4.1 M/UL — LOW (ref 4.2–5.4)
RBC # FLD: 15.8 % — HIGH (ref 11.5–14.5)
SAO2 % BLDV: 71 % — SIGNIFICANT CHANGE UP
SODIUM SERPL-SCNC: 142 MMOL/L — SIGNIFICANT CHANGE UP (ref 135–146)
TROPONIN T SERPL-MCNC: 0.03 NG/ML — CRITICAL HIGH
WBC # BLD: 8.39 K/UL — SIGNIFICANT CHANGE UP (ref 4.8–10.8)
WBC # FLD AUTO: 8.39 K/UL — SIGNIFICANT CHANGE UP (ref 4.8–10.8)

## 2019-06-12 PROCEDURE — 99285 EMERGENCY DEPT VISIT HI MDM: CPT

## 2019-06-12 PROCEDURE — 74176 CT ABD & PELVIS W/O CONTRAST: CPT | Mod: 26

## 2019-06-12 PROCEDURE — 72170 X-RAY EXAM OF PELVIS: CPT | Mod: 26

## 2019-06-12 PROCEDURE — 93010 ELECTROCARDIOGRAM REPORT: CPT

## 2019-06-12 PROCEDURE — 71045 X-RAY EXAM CHEST 1 VIEW: CPT | Mod: 26

## 2019-06-12 PROCEDURE — 72125 CT NECK SPINE W/O DYE: CPT | Mod: 26

## 2019-06-12 PROCEDURE — 70450 CT HEAD/BRAIN W/O DYE: CPT | Mod: 26

## 2019-06-12 PROCEDURE — 71250 CT THORAX DX C-: CPT | Mod: 26

## 2019-06-12 RX ORDER — SODIUM CHLORIDE 9 MG/ML
1000 INJECTION, SOLUTION INTRAVENOUS ONCE
Refills: 0 | Status: COMPLETED | OUTPATIENT
Start: 2019-06-12 | End: 2019-06-12

## 2019-06-12 RX ORDER — ALBUTEROL 90 UG/1
2.5 AEROSOL, METERED ORAL ONCE
Refills: 0 | Status: COMPLETED | OUTPATIENT
Start: 2019-06-12 | End: 2019-06-12

## 2019-06-12 RX ORDER — CALCIUM GLUCONATE 100 MG/ML
1 VIAL (ML) INTRAVENOUS ONCE
Refills: 0 | Status: COMPLETED | OUTPATIENT
Start: 2019-06-12 | End: 2019-06-12

## 2019-06-12 RX ORDER — DEXTROSE 50 % IN WATER 50 %
50 SYRINGE (ML) INTRAVENOUS ONCE
Refills: 0 | Status: COMPLETED | OUTPATIENT
Start: 2019-06-12 | End: 2019-06-12

## 2019-06-12 RX ORDER — VANCOMYCIN HCL 1 G
1000 VIAL (EA) INTRAVENOUS ONCE
Refills: 0 | Status: COMPLETED | OUTPATIENT
Start: 2019-06-12 | End: 2019-06-12

## 2019-06-12 RX ORDER — CEFEPIME 1 G/1
1000 INJECTION, POWDER, FOR SOLUTION INTRAMUSCULAR; INTRAVENOUS ONCE
Refills: 0 | Status: DISCONTINUED | OUTPATIENT
Start: 2019-06-12 | End: 2019-06-13

## 2019-06-12 RX ADMIN — Medication 250 MILLIGRAM(S): at 22:52

## 2019-06-12 RX ADMIN — SODIUM CHLORIDE 2000 MILLILITER(S): 9 INJECTION, SOLUTION INTRAVENOUS at 21:46

## 2019-06-12 RX ADMIN — Medication 200 GRAM(S): at 21:46

## 2019-06-12 RX ADMIN — ALBUTEROL 2.5 MILLIGRAM(S): 90 AEROSOL, METERED ORAL at 21:46

## 2019-06-12 RX ADMIN — Medication 50 MILLILITER(S): at 21:46

## 2019-06-12 RX ADMIN — SODIUM CHLORIDE 2000 MILLILITER(S): 9 INJECTION, SOLUTION INTRAVENOUS at 20:37

## 2019-06-12 NOTE — ED PROVIDER NOTE - ATTENDING CONTRIBUTION TO CARE
82 F with pmhx of HTN, TIA, h/o AAA repair, polymyalgia rheumatica here for rib pain. per son, pt had unknown injury 2-3 weeks ago. pt c/o R rib pain then so they saw PCP who ordered films. Family was notified possible rib fx and was told to [facility] ED for further w/u. per EMS, pt hypotensive, hypoxemic 82 F with pmhx of HTN, TIA, h/o AAA repair, polymyalgia rheumatica here for rib pain. per son, pt had unknown injury 2-3 weeks ago. pt c/o R rib pain then so they saw PCP who ordered films. Family was notified possible rib fx and was told to [facility] ED for further w/u. per EMS, pt hypotensive, hypoxemic w/ BP improving w/ fluids    vs hypotensive, hypoxemic  gen- NAD, aaox3  card-rrr  lungs-mild increased WOB, decreased breath sounds RML/RLL  abd-sntnd, no guarding or rebound  neuro- moving all extremities, sensation intact, no FND    given possible trauma, c/f rib fx, will get trauma w/u- pan scan, trauma labs, supplemental o2  possible PNA given rib fx, decreased breath sounds  fluids, abx, o2  trauma prn

## 2019-06-12 NOTE — ED ADULT NURSE NOTE - OBJECTIVE STATEMENT
Pt BIBA from home for deconditioned status over last 2 days per family member that lives with patient. Family member reports pt had no appetite and was weak yesterday. Pt complaining of pain to R side. Ecchymosis to R breast and R arm. Per family pt did not fall, she banged into furniture. Pt A&Ox4, following commands at this time.

## 2019-06-12 NOTE — ED PROVIDER NOTE - CARE PLAN
Principal Discharge DX:	Rib fracture  Secondary Diagnosis:	UTI (urinary tract infection)  Secondary Diagnosis:	Pleural effusion  Secondary Diagnosis:	Compression fracture of vertebrae

## 2019-06-12 NOTE — ED PROVIDER NOTE - PHYSICAL EXAMINATION
Hypoxic on monitor, sats in low 80s, tachypneic, no retractions. NCAT EOMI conjunctiva nml. No nasal discharge. dry mucous membranes. Neck supple, non tender, full ROM. RRR no MRG +S1S2. Coarse rhonchi on right, diminished at right base clear on left. Abd s NT ND +BS. Ext WWP x4, moving all extremities, no edema. 2+ equal pulses throughout. Cooperative, appropriate.

## 2019-06-12 NOTE — ED PROVIDER NOTE - OBJECTIVE STATEMENT
82 F with pmhx of HTN, TIA, h/o AAA repair, polymyalgia rheumatica p/w rib pain. per son, pt had unknown injury 2-3 weeks ago. pt c/o R rib pain  Family was notified possible rib fx  by PCP via X-ray and was told to got o ED for further w/u. per EMS, pt hypotensive, hypoxemic. Patient has been coughing, non-productive as per family, tactile fever for past few days. no nausea/vomiting/diarrhea.

## 2019-06-12 NOTE — ED PROVIDER NOTE - NS ED ROS FT
Constitutional: See HPI.  Eyes: No visual changes, eye pain or discharge. No Photophobia  ENMT: No hearing changes, pain, discharge or infections. No neck pain or stiffness. No limited ROM  Cardiac: No SOB or edema. No chest pain with exertion.  Respiratory: + cough,  respiratory distress. No hemoptysis.  GI: No nausea, vomiting, diarrhea or abdominal pain.  : No dysuria, frequency or burning. No Discharge  MS: No myalgia, muscle weakness, joint pain or back pain.  Neuro: No headache or weakness. No LOC.  Skin: No skin rash.  Except as documented in the HPI, all other systems are negative.

## 2019-06-12 NOTE — ED ADULT TRIAGE NOTE - CHIEF COMPLAINT QUOTE
sent from home for increase weakness, has not eaten in 6 days. rib fracture showed on chest x ray last week. family believes she hit her chest into a piece of furniture.

## 2019-06-12 NOTE — ED PROVIDER NOTE - CLINICAL SUMMARY MEDICAL DECISION MAKING FREE TEXT BOX
pt here for trauma 2-3 weeks ago, abn xr. ed w/u revealing rib fx, pleural effusion.   w/u revealing DANIEL, K normal on VBG  pt treated for uti, possible infected pleural effusion  case d/w trauma, rec admit to med  pt hd stable after ed resuscitation, stable for med tele admission

## 2019-06-13 LAB
ALBUMIN SERPL ELPH-MCNC: 3.3 G/DL — LOW (ref 3.5–5.2)
ALP SERPL-CCNC: 182 U/L — HIGH (ref 30–115)
ALT FLD-CCNC: 43 U/L — HIGH (ref 0–41)
ANION GAP SERPL CALC-SCNC: 17 MMOL/L — HIGH (ref 7–14)
APPEARANCE UR: ABNORMAL
AST SERPL-CCNC: 61 U/L — HIGH (ref 0–41)
B PERT IGG+IGM PNL SER: ABNORMAL
BACTERIA # UR AUTO: ABNORMAL /HPF
BASOPHILS # BLD AUTO: 0.02 K/UL — SIGNIFICANT CHANGE UP (ref 0–0.2)
BASOPHILS NFR BLD AUTO: 0.3 % — SIGNIFICANT CHANGE UP (ref 0–1)
BILIRUB SERPL-MCNC: 0.3 MG/DL — SIGNIFICANT CHANGE UP (ref 0.2–1.2)
BILIRUB UR-MCNC: ABNORMAL
BUN SERPL-MCNC: 80 MG/DL — CRITICAL HIGH (ref 10–20)
CALCIUM SERPL-MCNC: 10.5 MG/DL — HIGH (ref 8.5–10.1)
CHLORIDE SERPL-SCNC: 107 MMOL/L — SIGNIFICANT CHANGE UP (ref 98–110)
CK MB CFR SERPL CALC: 2.5 NG/ML — SIGNIFICANT CHANGE UP (ref 0.6–6.3)
CK SERPL-CCNC: 168 U/L — SIGNIFICANT CHANGE UP (ref 0–225)
CO2 SERPL-SCNC: 15 MMOL/L — LOW (ref 17–32)
COLOR FLD: SIGNIFICANT CHANGE UP
COLOR SPEC: YELLOW — SIGNIFICANT CHANGE UP
CREAT SERPL-MCNC: 3.1 MG/DL — HIGH (ref 0.7–1.5)
DIFF PNL FLD: ABNORMAL
EOSINOPHIL # BLD AUTO: 0.41 K/UL — SIGNIFICANT CHANGE UP (ref 0–0.7)
EOSINOPHIL # FLD: 18 % — SIGNIFICANT CHANGE UP
EOSINOPHIL NFR BLD AUTO: 5.4 % — SIGNIFICANT CHANGE UP (ref 0–8)
FLUID INTAKE SUBSTANCE CLASS: SIGNIFICANT CHANGE UP
FLUID SEGMENTED GRANULOCYTES: 6 % — SIGNIFICANT CHANGE UP
GLUCOSE SERPL-MCNC: 96 MG/DL — SIGNIFICANT CHANGE UP (ref 70–99)
GLUCOSE UR QL: NEGATIVE MG/DL — SIGNIFICANT CHANGE UP
HCT VFR BLD CALC: 35.6 % — LOW (ref 37–47)
HGB BLD-MCNC: 11 G/DL — LOW (ref 12–16)
IMM GRANULOCYTES NFR BLD AUTO: 1.7 % — HIGH (ref 0.1–0.3)
KETONES UR-MCNC: ABNORMAL
LACTATE SERPL-SCNC: 2 MMOL/L — SIGNIFICANT CHANGE UP (ref 0.5–2.2)
LEUKOCYTE ESTERASE UR-ACNC: ABNORMAL
LYMPHOCYTES # BLD AUTO: 1.13 K/UL — LOW (ref 1.2–3.4)
LYMPHOCYTES # BLD AUTO: 14.9 % — LOW (ref 20.5–51.1)
LYMPHOCYTES # FLD: 43 — SIGNIFICANT CHANGE UP
MCHC RBC-ENTMCNC: 29.6 PG — SIGNIFICANT CHANGE UP (ref 27–31)
MCHC RBC-ENTMCNC: 30.9 G/DL — LOW (ref 32–37)
MCV RBC AUTO: 95.7 FL — SIGNIFICANT CHANGE UP (ref 81–99)
MONOCYTES # BLD AUTO: 0.6 K/UL — SIGNIFICANT CHANGE UP (ref 0.1–0.6)
MONOCYTES NFR BLD AUTO: 7.9 % — SIGNIFICANT CHANGE UP (ref 1.7–9.3)
MONOS+MACROS # FLD: 33 % — SIGNIFICANT CHANGE UP
NEUTROPHILS # BLD AUTO: 5.28 K/UL — SIGNIFICANT CHANGE UP (ref 1.4–6.5)
NEUTROPHILS NFR BLD AUTO: 69.8 % — SIGNIFICANT CHANGE UP (ref 42.2–75.2)
NITRITE UR-MCNC: NEGATIVE — SIGNIFICANT CHANGE UP
NRBC # BLD: 0 /100 WBCS — SIGNIFICANT CHANGE UP (ref 0–0)
NRBC # FLD: SIGNIFICANT CHANGE UP % (ref 0–0)
PH UR: 5.5 — SIGNIFICANT CHANGE UP (ref 5–8)
PLATELET # BLD AUTO: 169 K/UL — SIGNIFICANT CHANGE UP (ref 130–400)
POTASSIUM SERPL-MCNC: 5.6 MMOL/L — HIGH (ref 3.5–5)
POTASSIUM SERPL-SCNC: 5.6 MMOL/L — HIGH (ref 3.5–5)
PROT SERPL-MCNC: 5.6 G/DL — LOW (ref 6–8)
PROT UR-MCNC: ABNORMAL MG/DL
RBC # BLD: 3.72 M/UL — LOW (ref 4.2–5.4)
RBC # FLD: 15.3 % — HIGH (ref 11.5–14.5)
RBC CASTS # UR COMP ASSIST: ABNORMAL /HPF
RCV VOL RI: HIGH /UL (ref 0–5)
SODIUM SERPL-SCNC: 139 MMOL/L — SIGNIFICANT CHANGE UP (ref 135–146)
SP GR SPEC: 1.02 — SIGNIFICANT CHANGE UP (ref 1.01–1.03)
TOTAL NUCLEATED CELL COUNT, BODY FLUID: 388 /UL — HIGH (ref 0–5)
TROPONIN T SERPL-MCNC: 0.01 NG/ML — SIGNIFICANT CHANGE UP
TUBE TYPE: SIGNIFICANT CHANGE UP
UROBILINOGEN FLD QL: 0.2 MG/DL — SIGNIFICANT CHANGE UP (ref 0.2–0.2)
WBC # BLD: 7.57 K/UL — SIGNIFICANT CHANGE UP (ref 4.8–10.8)
WBC # FLD AUTO: 7.57 K/UL — SIGNIFICANT CHANGE UP (ref 4.8–10.8)
WBC UR QL: >50 /HPF

## 2019-06-13 PROCEDURE — 88188 FLOWCYTOMETRY/READ 9-15: CPT

## 2019-06-13 PROCEDURE — 71045 X-RAY EXAM CHEST 1 VIEW: CPT | Mod: 26

## 2019-06-13 PROCEDURE — 99222 1ST HOSP IP/OBS MODERATE 55: CPT

## 2019-06-13 PROCEDURE — 93010 ELECTROCARDIOGRAM REPORT: CPT

## 2019-06-13 RX ORDER — GABAPENTIN 400 MG/1
100 CAPSULE ORAL THREE TIMES A DAY
Refills: 0 | Status: DISCONTINUED | OUTPATIENT
Start: 2019-06-13 | End: 2019-06-13

## 2019-06-13 RX ORDER — AZTREONAM 2 G
2000 VIAL (EA) INJECTION EVERY 6 HOURS
Refills: 0 | Status: DISCONTINUED | OUTPATIENT
Start: 2019-06-13 | End: 2019-06-14

## 2019-06-13 RX ORDER — GABAPENTIN 400 MG/1
0 CAPSULE ORAL
Qty: 0 | Refills: 0 | DISCHARGE

## 2019-06-13 RX ORDER — SODIUM CHLORIDE 9 MG/ML
1000 INJECTION, SOLUTION INTRAVENOUS
Refills: 0 | Status: DISCONTINUED | OUTPATIENT
Start: 2019-06-13 | End: 2019-06-13

## 2019-06-13 RX ORDER — MORPHINE SULFATE 50 MG/1
1 CAPSULE, EXTENDED RELEASE ORAL EVERY 6 HOURS
Refills: 0 | Status: DISCONTINUED | OUTPATIENT
Start: 2019-06-13 | End: 2019-06-14

## 2019-06-13 RX ORDER — GABAPENTIN 400 MG/1
5 CAPSULE ORAL
Qty: 0 | Refills: 0 | DISCHARGE

## 2019-06-13 RX ORDER — CEFTRIAXONE 500 MG/1
1 INJECTION, POWDER, FOR SOLUTION INTRAMUSCULAR; INTRAVENOUS EVERY 24 HOURS
Refills: 0 | Status: DISCONTINUED | OUTPATIENT
Start: 2019-06-13 | End: 2019-06-17

## 2019-06-13 RX ORDER — MORPHINE SULFATE 50 MG/1
2 CAPSULE, EXTENDED RELEASE ORAL EVERY 6 HOURS
Refills: 0 | Status: DISCONTINUED | OUTPATIENT
Start: 2019-06-13 | End: 2019-06-13

## 2019-06-13 RX ORDER — AMLODIPINE BESYLATE 2.5 MG/1
10 TABLET ORAL DAILY
Refills: 0 | Status: DISCONTINUED | OUTPATIENT
Start: 2019-06-13 | End: 2019-06-13

## 2019-06-13 RX ORDER — CHLORHEXIDINE GLUCONATE 213 G/1000ML
1 SOLUTION TOPICAL
Refills: 0 | Status: DISCONTINUED | OUTPATIENT
Start: 2019-06-13 | End: 2019-06-18

## 2019-06-13 RX ORDER — TRAMADOL HYDROCHLORIDE 50 MG/1
1 TABLET ORAL
Qty: 0 | Refills: 0 | DISCHARGE

## 2019-06-13 RX ORDER — OXYCODONE HYDROCHLORIDE 5 MG/1
10 TABLET ORAL THREE TIMES A DAY
Refills: 0 | Status: DISCONTINUED | OUTPATIENT
Start: 2019-06-13 | End: 2019-06-14

## 2019-06-13 RX ORDER — SODIUM CHLORIDE 9 MG/ML
1000 INJECTION INTRAMUSCULAR; INTRAVENOUS; SUBCUTANEOUS ONCE
Refills: 0 | Status: COMPLETED | OUTPATIENT
Start: 2019-06-13 | End: 2019-06-13

## 2019-06-13 RX ORDER — HEPARIN SODIUM 5000 [USP'U]/ML
5000 INJECTION INTRAVENOUS; SUBCUTANEOUS EVERY 8 HOURS
Refills: 0 | Status: DISCONTINUED | OUTPATIENT
Start: 2019-06-13 | End: 2019-06-17

## 2019-06-13 RX ORDER — SODIUM CHLORIDE 9 MG/ML
1000 INJECTION, SOLUTION INTRAVENOUS
Refills: 0 | Status: DISCONTINUED | OUTPATIENT
Start: 2019-06-13 | End: 2019-06-14

## 2019-06-13 RX ORDER — MIRTAZAPINE 45 MG/1
7.5 TABLET, ORALLY DISINTEGRATING ORAL AT BEDTIME
Refills: 0 | Status: DISCONTINUED | OUTPATIENT
Start: 2019-06-13 | End: 2019-06-18

## 2019-06-13 RX ADMIN — CHLORHEXIDINE GLUCONATE 1 APPLICATION(S): 213 SOLUTION TOPICAL at 05:14

## 2019-06-13 RX ADMIN — GABAPENTIN 100 MILLIGRAM(S): 400 CAPSULE ORAL at 06:21

## 2019-06-13 RX ADMIN — HEPARIN SODIUM 5000 UNIT(S): 5000 INJECTION INTRAVENOUS; SUBCUTANEOUS at 21:17

## 2019-06-13 RX ADMIN — Medication 100 MILLIGRAM(S): at 01:19

## 2019-06-13 RX ADMIN — SODIUM CHLORIDE 100 MILLILITER(S): 9 INJECTION, SOLUTION INTRAVENOUS at 05:13

## 2019-06-13 RX ADMIN — Medication 100 MILLIGRAM(S): at 12:07

## 2019-06-13 RX ADMIN — HEPARIN SODIUM 5000 UNIT(S): 5000 INJECTION INTRAVENOUS; SUBCUTANEOUS at 15:09

## 2019-06-13 RX ADMIN — Medication 100 MILLIGRAM(S): at 23:02

## 2019-06-13 RX ADMIN — Medication 100 MILLIGRAM(S): at 18:23

## 2019-06-13 RX ADMIN — HEPARIN SODIUM 5000 UNIT(S): 5000 INJECTION INTRAVENOUS; SUBCUTANEOUS at 05:14

## 2019-06-13 RX ADMIN — CEFTRIAXONE 100 GRAM(S): 500 INJECTION, POWDER, FOR SOLUTION INTRAMUSCULAR; INTRAVENOUS at 08:57

## 2019-06-13 RX ADMIN — OXYCODONE HYDROCHLORIDE 10 MILLIGRAM(S): 5 TABLET ORAL at 07:36

## 2019-06-13 RX ADMIN — SODIUM CHLORIDE 75 MILLILITER(S): 9 INJECTION, SOLUTION INTRAVENOUS at 15:10

## 2019-06-13 RX ADMIN — AMLODIPINE BESYLATE 10 MILLIGRAM(S): 2.5 TABLET ORAL at 06:21

## 2019-06-13 RX ADMIN — MORPHINE SULFATE 1 MILLIGRAM(S): 50 CAPSULE, EXTENDED RELEASE ORAL at 21:22

## 2019-06-13 RX ADMIN — SODIUM CHLORIDE 3000 MILLILITER(S): 9 INJECTION INTRAMUSCULAR; INTRAVENOUS; SUBCUTANEOUS at 16:27

## 2019-06-13 RX ADMIN — Medication 100 MILLIGRAM(S): at 06:32

## 2019-06-13 NOTE — H&P ADULT - ASSESSMENT
#)Pneumonia        #)HAGMA, elevated lactate, DANIEL on CKD/ hyperkalemia, inc CK likely due to rhabdomyolysis from fall  -c/w LR @100/hour  -repeat CK, lactate  -check urine lytes          #)Diet:   #)Activity:   #)DVT ppx:  #)GI ppx:  #)Dispo;  #)Code; #)UTI        #)HAGMA, elevated lactate, DANIEL on CKD/ hyperkalemia, inc CK likely due to rhabdomyolysis from fall  -c/w LR @100/hour  -repeat CK, lactate  -check urine lytes          #)Diet:   #)Activity:   #)DVT ppx:  #)GI ppx:  #)Dispo;  #)Code; 82 Female poor historian with pmhx of HTN, TIA, h/o AAA repair, polymyalgia rheumatica p/w rib pain after fall 2-3 weeks ago    #)Acute fracture of right 6th rib s/p fall  -pain control  -incentive spirometry    #)Unilateral large pleural effusion with mediastinal lymphadenopathy and right hepatic lobe hypodensity DD: malignancy can't be excluded  -No fever in the hospital, no inc WBC count CT chest negative for consolidation  -In ED received abx, no abx for now  -Pulm consult for possible tap  -HOB elevation 45 degrees  -Keep SPo2>92    #)HAGMA, elevated lactate, DANIEL on CKD/ hyperkalemia, inc CK likely due to rhabdomyolysis from fall  -c/w LR @100/hour  -repeat CK, lactate  -check urine lytes  -renal US    #)UTI positive UA  -will start on rocephin  -Follow up urine culture    #)inc trops likely from fall rhabdo  -No EKG was done ordered stat EKG  -Follow up repeat    #)HTN  -on amlodipine, labetalol, hold losartan     #)TIA  -on plavix hold for now    #)Diet: DASH diet  #)Activity: OOBTC  #)DVT ppx: Hep SC  #)GI ppx: Not indicated  #)Dispo; From home as per patient  #)Code; Full code for now 82 Female poor historian with pmhx of HTN, TIA, h/o AAA repair, polymyalgia rheumatica p/w rib pain after fall 2-3 weeks ago    #)Acute fracture of right 6th rib s/p fall  -pain control  -incentive spirometry    #)Unilateral large pleural effusion with mediastinal lymphadenopathy and right hepatic lobe hypodensity DD: malignancy can't be excluded  -No fever in the hospital, no inc WBC count CT chest negative for consolidation  -In ED received abx, no abx for now  -Pulm consult for possible tap  -HOB elevation 45 degrees  -Keep SPo2>92    #)HAGMA, elevated lactate, DANIEL on CKD/ hyperkalemia, inc CK likely due to rhabdomyolysis from fall  -c/w LR @100/hour  -repeat CK, lactate  -check urine lytes  -renal US    #)UTI positive UA  -will start on rocephin  -Follow up urine culture    #)inc trops likely from fall rhabdo  -EKG no ischemic changes  -Follow up repeat CE    #)HTN  -on amlodipine, hold  labetalol for montse, hold losartan     #)TIA  -on plavix hold for now    #)Diet: DASH diet  #)Activity: OOBTC  #)DVT ppx: Hep SC  #)GI ppx: Not indicated  #)Dispo; From home as per patient  #)Code; Full code for now    MEDREC confirmed with pharmacy 82 Female poor historian with pmhx of HTN, TIA, h/o AAA repair, polymyalgia rheumatica p/w rib pain after fall 2-3 weeks ago    #)Acute fracture of right 6th rib s/p fall  -pain control  -incentive spirometry    #)Unilateral large pleural effusion with mediastinal lymphadenopathy and right hepatic lobe hypodensity DD: malignancy can't be excluded  -No fever in the hospital, no inc WBC count CT chest negative for consolidation  -In ED received abx, no abx for now  -Pulm consult for possible tap  -HOB elevation 45 degrees  -Keep SPo2>92  -2d echo    #)HAGMA, elevated lactate, DANIEL on CKD/ hyperkalemia likely due to ??rhabdomyolysis from fall but CK is not significant enough for rhabdo  -c/w LR @100/hour and reassess need for fluids avoid fluid overload  -repeat CK, lactate  -check urine lytes  -renal US  -Nephrology consult for NITO merino    #)UTI positive UA  -will start on rocephin  -Follow up urine culture    #)inc trops likely from fall rhabdo  -EKG no ischemic changes  -Follow up repeat CE    #)HTN  -on amlodipine, hold  labetalol for montse, hold losartan     #)TIA  -on plavix hold for now    #)Diet: DASH diet  #)Activity: OOBTC  #)DVT ppx: Hep SC  #)GI ppx: Not indicated  #)Dispo; From home as per patient  #)Code; Full code for now    MEDREC confirmed with pharmacy

## 2019-06-13 NOTE — H&P ADULT - NSICDXPASTMEDICALHX_GEN_ALL_CORE_FT
PAST MEDICAL HISTORY:  Abdominal aortic aneurysm (AAA) without rupture     Essential hypertension     Falls     Hip fracture     Polymyalgia rheumatica     Smoker     TIA (transient ischemic attack)

## 2019-06-13 NOTE — H&P ADULT - ATTENDING COMMENTS
Patient seen and examined independently. Agree with resident note with exceptions.     # Exudative Right pleural effusion with mediastinal lymphadenopathy and right hepatic lobe hypodensity probably malignancy  -  CT Chest No Cont (06.12.19 @ 22:35) multiple enlarged right paratracheal lymph nodes measuring up to 2.7 x 2.6 cm (series 7, image 111). Subcarinal lymph node measures 3.0 x 2.3 cm. Questionable 1.7 cm right hepatic lobe hypodensity. Age-indeterminate T12 compression deformity, new from 2017. Large right pleura effusion with compressive atelectasis.   - c/w ceftriaxone  - F/u cytology  - evaluated by pulm Patient seen and examined independently. Agree with resident note with exceptions.     # Exudative Right pleural effusion with mediastinal lymphadenopathy and right hepatic lobe hypodensity probably malignancy  -  CT Chest No Cont (06.12.19 @ 22:35) multiple enlarged right paratracheal lymph nodes measuring up to 2.7 x 2.6 cm (series 7, image 111). Subcarinal lymph node measures 3.0 x 2.3 cm. Questionable 1.7 cm right hepatic lobe hypodensity. Age-indeterminate T12 compression deformity, new from 2017. Large right pleura effusion with compressive atelectasis.   - S/p thoracentesis  - c/w ceftriaxone  - F/u Blood cultures  - F/u cytology  - Family does not want any work up if its malignancy  - evaluated by pulm  - maintain oxygen >92    #UTI  - F/u cultures  - C/w ceftriaxone    #HAGMA, Lactic acidosis  - trend lactate    #Acute kidney injury on CKD Stage 3  - IV fluids  - monitor BUN/creat  - hold losartan  -F/u renal us    #Hyperkalemia  - S/p dextrose/insulin  - monitor K    #H/o TIA  - c/w plavix    #H/o Hypertension- pt with low BP  - hold norvac, labetalol, losatrtan    #Rib fracture  - incentive spirometry  - pain meds    #Type 2 MI sec to hypotension, CKD    # DVT prophylaxis    #Poor Prognosis    #Code : DNR, DNI, no PEG tube/NGT, no dialysis.  #Discussed with patients daughter- wants to decide on comfort care after discussing with other family member. Agreeable to comfort feeds  # Disposition: undecided Patient seen and examined independently. Agree with resident note with exceptions.     # Exudative Right pleural effusion with mediastinal lymphadenopathy and right hepatic lobe hypodensity probably malignancy  -  CT Chest No Cont (06.12.19 @ 22:35) multiple enlarged right paratracheal lymph nodes measuring up to 2.7 x 2.6 cm (series 7, image 111). Subcarinal lymph node measures 3.0 x 2.3 cm. Questionable 1.7 cm right hepatic lobe hypodensity. Age-indeterminate T12 compression deformity, new from 2017. Large right pleura effusion with compressive atelectasis.   - S/p thoracentesis  - c/w ceftriaxone  - F/u Blood cultures  - F/u cytology  - Family does not want any work up if its malignancy  - evaluated by pulm  - maintain oxygen >92    #UTI  - F/u cultures  - C/w ceftriaxone    #HAGMA, Lactic acidosis  - trend lactate    #Acute kidney injury on CKD Stage 3  - IV fluids  - monitor BUN/creat  - hold losartan  -F/u renal us    #Hyperkalemia  - S/p dextrose/insulin  - monitor K    #H/o TIA  - c/w plavix    #H/o Hypertension- pt with low BP  - hold norvac, labetalol, losatrtan    #Rib fracture  - incentive spirometry  - pain meds    #Type 2 MI sec to hypotension, CKD    # DVT prophylaxis    #Poor Prognosis    #Code : DNR, DNI, no PEG tube/NGT, no dialysis.  #Discussed with patients daughter- wants to decide on comfort care after discussing with other family member.  # Disposition: undecided

## 2019-06-13 NOTE — CONSULT NOTE ADULT - ASSESSMENT
DANIEL  -oliguric  -likely ischemic acute tubular injury from hypotension   -nl renal anatomy on CT imaging (no iv dye given)  CKD 3  -Cr 1.3 1o/2018 baseline  hypotension with baseline HTN  -volume depletion +/- sepsis / bp meds / ARB  -s/p 3L LR given  ARF  large right pleural effusion  metabolic acidosis  UTI  r/o PNA  r/o malignancy (lung ca - smoker)?  AAA repair   PMR    plan:    ICU / pulm eval - d/w medicine resident  place ugarte  trend renal function and urine output  hold labetalol, losartan, amlodipine, gabapentin  may need bicarb gtt - will f/u today's labs and adjust fluids accordingly  no indication for emergent dialysis  broad spectrum abx adjusted for GFR for UTI / PNA  check blood and urine cultures  send Urine for osmolality and FeNa%  consider thoracentesis / nebs / o2  will follow closely DANIEL  -oliguric  -likely ischemic acute tubular injury from hypotension   -nl renal anatomy on CT imaging (no iv dye given)  CKD 3  -Cr 1.3 1o/2018 baseline  hypotension with baseline HTN  -volume depletion +/- sepsis / bp meds / ARB  -s/p 3L LR given  ARF  large right pleural effusion  metabolic acidosis  UTI  r/o PNA  r/o malignancy (lung ca - smoker)?  AAA repair   PMR    plan:    ICU / pulm eval - d/w medicine resident  place ugarte  trend renal function and urine output  hold labetalol, losartan, amlodipine, gabapentin  may need bicarb gtt - will f/u today's labs and adjust fluids accordingly  no indication for emergent dialysis  broad spectrum abx adjusted for GFR for UTI / PNA  check blood and urine cultures  send Urine for osmolality and FeNa%  consider thoracentesis / nebs / o2  will follow closely      addendum:    pt seen again this afternoon.  Spoke with grandson (her caretaker) and pulm fellow.  Ugarte with some urine output now.  Labs noted, cr slightly better, mild hyperK+ / Ca++ and improving acidosis.  Will change to 1/2NS wih NaHCO3 75/L @ 75cc/hr.  Pulm to perform thoracentesis.  Can give prn kayexalate if K+ > 5.7.

## 2019-06-13 NOTE — CONSULT NOTE ADULT - ASSESSMENT
ASSESSMENT:  83 F with PMH significant for  HTN, TIA, s/p AAA repair, polymyalgia rheumatica, poor historian; presents to the ED complaining of rib pain. Right pleural effusion.    PLAN:    ·	No acute signs of trauma  ·	No surgical intervention indicated at this time  ·	Admit to medicine for management of right pleural effusion and work up for pneumonia ASSESSMENT:  83 F with PMH significant for  HTN, TIA, s/p AAA repair, polymyalgia rheumatica, poor historian; presents to the ED complaining of rib pain. Right pleural effusion.    PLAN:    ·	No acute signs of trauma  ·	No surgical intervention indicated at this time  ·	Admit to medicine for management of right pleural effusion and work up for pneumonia    Senior Trauma Resident Note  84 yo f s/p ?fall 2 weeks ago with single rib fx  now with effusion likely parapneumonic , pulm for tap  no pain for subacute rib fracture   abx  no traumatic issues  will follow for tertiary survey   Airway intact  Bilateral Breath Sounds  Palpable pulses in 4 ext  GCS 15, PERRL, FELICIANO  VSS  No Subq emphysema, abdominal tenderness,  or pelvic instability   CXR and PXR negative  FAST neg  Ct findings above  Will Dispo accordingly  Plan as above d/w Dr Keegan Hollins

## 2019-06-13 NOTE — H&P ADULT - NSHPPHYSICALEXAM_GEN_ALL_CORE
ICU Vital Signs Last 24 Hrs  T(C): 37.3 (12 Jun 2019 20:20), Max: 37.3 (12 Jun 2019 20:20)  T(F): 99.1 (12 Jun 2019 20:20), Max: 99.1 (12 Jun 2019 20:20)  HR: 58 (13 Jun 2019 01:25) (58 - 81)  BP: 105/58 (13 Jun 2019 01:25) (90/56 - 105/58)  BP(mean): --  ABP: --  ABP(mean): --  RR: 20 (13 Jun 2019 01:25) (20 - 22)  SpO2: 92% (13 Jun 2019 01:25) (79% - 94%)    PHYSICAL EXAM:  GENERAL: NAD, speaks in full sentences, no signs of respiratory distress  HEAD:  Atraumatic, Normocephalic  EYES: EOMI, PERRLA, conjunctiva and sclera clear  CHEST/LUNG: Clear to auscultation bilaterally; No wheeze; No crackles; No accessory muscles used  HEART: Regular rate and rhythm; No murmurs;   ABDOMEN: Soft, Nontender, Nondistended; Bowel sounds present; No guarding  EXTREMITIES:  2+ Peripheral Pulses, No cyanosis or edema  PSYCH: AAOx3  NEUROLOGY: non-focal ICU Vital Signs Last 24 Hrs  T(C): 37.3 (12 Jun 2019 20:20), Max: 37.3 (12 Jun 2019 20:20)  T(F): 99.1 (12 Jun 2019 20:20), Max: 99.1 (12 Jun 2019 20:20)  HR: 58 (13 Jun 2019 01:25) (58 - 81)  BP: 105/58 (13 Jun 2019 01:25) (90/56 - 105/58)  BP(mean): --  ABP: --  ABP(mean): --  RR: 20 (13 Jun 2019 01:25) (20 - 22)  SpO2: 92% (13 Jun 2019 01:25) (79% - 94%)    PHYSICAL EXAM:  GENERAL: Mild distress  HEAD:  Atraumatic, Normocephalic  CHEST/LUNG: b/l rhonchi  HEART: Regular rate and rhythm; No murmurs;   ABDOMEN: Soft, Nontender, Nondistended; Bowel sounds present; No guarding  EXTREMITIES: No edema  Neuro: AOx2 knows name, place, able to move extremities

## 2019-06-13 NOTE — CONSULT NOTE ADULT - ASSESSMENT
Impression:  Right pleural effusion: Mediastinal Lymph nodes with possible liver mets. Most likely malignant effusion. Heavy smoker +weight loss  Acute renal failure with high anion gap metabolic acidosis secondary to dehydration.  Failure to thrive.     PLAN:    CNS: NO sedation    HEENT: Oral care    PULMONARY:  HOB @ 45 degrees. Thoracentesis right side. Continue with oxygen to maintain pulse Ox >92%. Follow pleural fluid analysis.    CARDIOVASCULAR: Continue with iv fluid for now. Change to 1/2 ns + bicarb.    GI: GI prophylaxis.  Feeding as tolerated. Calorie count. Encourage feed. Nutrition evaluation. Add appetite stimulant if needed.     RENAL:  Follow up lytes.  Correct as needed. Continue with monitor urine output. Follow with Renal.     INFECTIOUS DISEASE: Follow up cultures. No indication for Abx at this time.     HEMATOLOGICAL:  DVT prophylaxis.    ENDOCRINE:  Follow up FS.  Insulin protocol if needed.    MUSCULOSKELETAL: Out of bed to chair    DNR/DNI  Not a candidate for unit.  Palliative care eval. Impression:    Right pleural effusion: Mediastinal Lymph nodes with possible liver mets. Most likely malignant effusion. Heavy smoker +weight loss  Acute renal failure   Failure to thrive.     PLAN:    CNS: NO sedation    HEENT: Oral care    PULMONARY:  HOB @ 45 degrees. Thoracentesis right side. Continue with oxygen to maintain pulse Ox >92%. Follow pleural fluid analysis.    CARDIOVASCULAR: Continue with iv fluid for now. Change to 1/2 ns + bicarb.    GI: GI prophylaxis.  Feeding as tolerated. Calorie count. Encourage feed. Nutrition evaluation. Add appetite stimulant if needed.     RENAL:  Follow up lytes.  Correct as needed. Continue with monitor urine output. Follow with Renal.     INFECTIOUS DISEASE: Follow up cultures. No indication for Abx at this time.     HEMATOLOGICAL:  DVT prophylaxis.    ENDOCRINE:  Follow up FS.  Insulin protocol if needed.    MUSCULOSKELETAL: Out of bed to chair    DNR/DNI  Not a candidate for unit.  Palliative care negro.   spoke with grandson at bedside

## 2019-06-13 NOTE — CONSULT NOTE ADULT - SUBJECTIVE AND OBJECTIVE BOX
83y f  TRAUMA ACTIVATION LEVEL:  Trauma consult    MECHANISM OF INJURY: S/p fall      [] Blunt               [] MVC	                       [X] Fall	                 [] Pedestrian Struck	     [] Motorcycle    [] Assault                       [] Bicycle collision  [] Sports injury     [] Penetrating    [] Gun Shot Wound    [] Stab Wound    GCS: 	15  E: 4	V: 5	M: 6    HPI:  83 F with PMH significant for  HTN, TIA, s/p AAA repair, polymyalgia rheumatica, poor historian; presents to the ED complaining of rib pain. As per son bedside, patient is known to have had rib fractures (by PCP, found on Xray) but does not know mechanism of injury and patient unable to recall. Son states that patient normally ambulates with walker but over the last 2-3 weeks since the fall, patient has been having worsening pain, unable to tolerate PO diet, and coughing (non productive). No nausea or vomiting.     PAST MEDICAL & SURGICAL HISTORY:  Hip fracture  Smoker  Polymyalgia rheumatica  Abdominal aortic aneurysm (AAA) without rupture  Essential hypertension  Falls  TIA (transient ischemic attack)  H/O abdominal aortic aneurysm repair    Allergies    Fosamax (Other (Moderate))  ibuprofen (Flushing)  Novacet (Rash)  penicillin (Rash)  Sulfac 10% (Rash)  Tylenol (Pruritus)  Intolerances    Home Medications:  amLODIPine 5 mg oral tablet: 1 tab(s) orally once a day (11 Oct 2018 09:06)  bisacodyl 5 mg oral delayed release tablet: 1 tab(s) orally once a day (at bedtime) (11 Oct 2018 09:06)  CLOPIDOGREL  TAB 75M  orally once a day (05 Oct 2018 18:21)  docusate sodium 100 mg oral capsule: 1 cap(s) orally 3 times a day (11 Oct 2018 09:06)  GABAPENTIN   /5ML: 5 milliliter(s) orally 4 times a day (05 Oct 2018 18:21)  labetalol 200 mg oral tablet: 1 tab(s) orally every 12 hours (11 Oct 2018 09:06)  LOSARTAN POT TAB 50M  orally once a day (05 Oct 2018 18:21)  melatonin 3 mg oral tablet: 1 tab(s) orally once a day (at bedtime), As needed, Insomnia (11 Oct 2018 09:06)  MIRTAZAPINE  TAB 7.5M  orally once a day (05 Oct 2018 18:21)  ondansetron 2 mg/mL injectable solution:  injectable  (11 Oct 2018 09:06)  oxyCODONE 10 mg oral tablet: 1 tab(s) orally every 4 hours, As needed, Moderate Pain (4 - 6) (11 Oct 2018 09:06)  oxyCODONE 5 mg oral tablet: 1 tab(s) orally every 4 hours, As needed, Mild Pain (1 - 3) (11 Oct 2018 09:06)  senna oral tablet: 2 tab(s) orally once a day (at bedtime) (11 Oct 2018 09:06)    ROS: 10-system review is otherwise negative except HPI above.      Primary Survey:    A - airway intact  B - bilateral breath sounds and good chest rise  C - palpable pulses in all extremities  D - GCS 15 on arrival, FELICIANO  Exposure obtained    Vital Signs Last 24 Hrs  T(C): 37.3 (2019 20:20), Max: 37.3 (2019 20:20)  T(F): 99.1 (2019 20:20), Max: 99.1 (2019 20:20)  HR: 58 (2019 01:25) (58 - 81)  BP: 105/58 (2019 01:25) (90/56 - 105/58)  RR: 20 (2019 01:25) (20 - 22)  SpO2: 92% (2019 01:25) (79% - 94%)    Secondary Survey:   General: NAD  HEENT: Normocephalic, atraumatic, EOMI, PEERLA. no scalp lacerations   Neck: Soft, midline trachea. no cspine tenderness  Chest: No chest wall tenderness. or subq  emphysema   Cardiac: S1, S2, RRR  Respiratory: Bilateral breath sounds, clear and equal bilaterally  Abdomen: Soft, non-distended, non-tender, no rebound,   Groin: Normal appearing, pelvis stable   Ext: palp radial b/l UE, b/l DP palp in Lower Extrem.   Back: no TTP, no palpable runoff/stepoff/deformity    FAST    LABS:  Labs:  CAPILLARY BLOOD GLUCOSE      POCT Blood Glucose.: 171 mg/dL (2019 00:32)                          12.1   8.39  )-----------( 218      ( 2019 20:35 )             39.1       Auto Immature Granulocyte %: 1.5 % (19 @ 20:35)  Auto Neutrophil %: 73.5 % (19 @ 20:35)        142  |  105  |  89<HH>  ----------------------------<  60<L>  8.7<HH>   |  12<L>  |  3.7<H>      Calcium, Total Serum: 10.4 mg/dL (19 @ 20:35)    LFTs:             6.4  | 0.4  | 94       ------------------[192     ( 2019 20:35 )  3.5  | x    | 48          Lipase:38     Amylase:x         Blood Gas Venous - Lactate: 2.1 mmoL/L (19 @ 22:03)  Lactate, Blood: 1.9 mmol/L (19 @ 20:35)      Coags:    CARDIAC MARKERS ( 2019 20:35 )  x     / 0.03 ng/mL / 296 U/L / x     / x          Serum Pro-Brain Natriuretic Peptide: 6169 pg/mL (19 @ 20:35)    Urinalysis Basic - ( 2019 00:24 )    Color: Yellow / Appearance: Turbid / S.020 / pH: x  Gluc: x / Ketone: Trace  / Bili: Small / Urobili: 0.2 mg/dL   Blood: x / Protein: Trace mg/dL / Nitrite: Negative   Leuk Esterase: Large / RBC: 3-5 /HPF / WBC >50 /HPF   Sq Epi: x / Non Sq Epi: x / Bacteria: Many /HPF    RADIOLOGY & ADDITIONAL STUDIES:  ---------------------------------------------------------------------------------------  < from: CT Chest No Cont (19 @ 22:35) >  Acute fracture of right rib #6. No pneumothorax.    Large right pleura effusion with compressive atelectasis.    No CT evidence of acute intra-abdominal pathology.    < end of copied text >    < from: CT Cervical Spine No Cont (19 @ 22:34) >    IMPRESSION:    No acute cervical spine fracture or subluxation.    Partially visualized large right pleural fusion, better characterized on   CT chest, abdomen and pelvis from same day.    < end of copied text >    < from: CT Head No Cont (19 @ 22:24) >  Impression:      No CT evidence for acute intracranial pathology.     Chronic microvascular ischemic changes.    < end of copied text >

## 2019-06-13 NOTE — H&P ADULT - HISTORY OF PRESENT ILLNESS
82 F with pmhx of HTN, TIA, h/o AAA repair, polymyalgia rheumatica p/w rib pain. per son, pt had unknown injury 2-3 weeks ago. pt c/o R rib pain  Family was notified possible rib fx  by PCP via X-ray and was told to got o ED for further w/u. per EMS, pt hypotensive, hypoxemic. Patient has been coughing, non-productive as per family, tactile fever for past few days. no nausea/vomiting/diarrhea.      vitals in ED; 90/56, 81, 98.4F, 22, 79% on room air, inc to 94% on NC,  labs Hb 12.1, AG 25, BUN 89, creatinine 3.7, , AST 94, ALt 48, , trops 0.03, BNP 6169, VBG showed metabolic acidosis. lactate of 2.1, UA positive for large LE, CT head and C spine negative for fractures, CT chest and abdomen showed multi station mediastinal lymphadenopathy, right hepatic lobe density, trace left and large right pleural effusion s/p 3 litre LR bolus, vanc, aztreonam, levaquin, calcium gluconate, nebulization. 82 F with pmhx of HTN, TIA, h/o AAA repair, polymyalgia rheumatica p/w rib pain. per son, pt had unknown injury 2-3 weeks ago. pt c/o R rib pain  Family was notified possible rib fx  by PCP via X-ray and was told to got o ED for further w/u. per EMS, pt hypotensive, hypoxemic. Patient has been coughing, non-productive as per family, tactile fever for past few days. no nausea/vomiting/diarrhea.      vitals in ED; 90/56, 81, 98.4F, 22, 79% on room air, inc to 94% on NC,  labs Hb 12.1, AG 25, BUN 89, creatinine 3.7 baseline 1.4-1.5 , AST 94, ALt 48, , trops 0.03, BNP 6169, VBG showed metabolic acidosis. lactate of 2.1, UA positive for large LE, CT head and C spine negative for fractures, CT chest and abdomen showed multi station mediastinal lymphadenopathy, right hepatic lobe density, trace left and large right pleural effusion s/p 3 litre LR bolus, vanc, aztreonam, levaquin, calcium gluconate, nebulization. History obtained from chart and patient called the numbers in the chart went to voicemail no family at bedside.   82 Female poor historian with pmhx of HTN, TIA, h/o AAA repair, polymyalgia rheumatica p/w rib pain. As per patient she lives alone.   as per ED chart she presented to the hospital for right rib pain.  Family was notified possible rib fx  by PCP via X-ray and was told to got o ED for further w/u. As per son, pt had unknown injury 2-3 weeks ago rib pain started after that. also c/o non productive cough, fever for past few days. When EMS went to home she was hypotensive hypoxemic. Denies any chills, nausea, vomiting, abdominal pain.     vitals in ED; 90/56, 81, 98.4F, 22, 79% on room air, inc to 94% on NC,  labs Hb 12.1, AG 25, BUN 89, creatinine 3.7 baseline 1.4-1.5 , AST 94, ALt 48, , trops 0.03, BNP 6169, VBG showed metabolic acidosis. lactate of 2.1, UA positive for large LE, CT head and C spine negative for fractures, CT chest and abdomen showed multi station mediastinal lymphadenopathy, right hepatic lobe density, trace left and large right pleural effusion s/p 3 litre LR bolus, vanc, aztreonam, levaquin, calcium gluconate, nebulization.

## 2019-06-13 NOTE — CONSULT NOTE ADULT - SUBJECTIVE AND OBJECTIVE BOX
Patient is a 83y old  Female who presents with a chief complaint of weakness (2019 10:13)      HPI:  History obtained from chart and patient called the numbers in the chart went to voicemail no family at bedside.   82 Female poor historian with pmhx of HTN, TIA, h/o AAA repair, polymyalgia rheumatica p/w rib pain. As per patient she lives alone.   as per ED chart she presented to the hospital for right rib pain.  Family was notified possible rib fx  by PCP via X-ray and was told to got o ED for further w/u. As per son, pt had unknown injury 2-3 weeks ago rib pain started after that. also c/o non productive cough, fever for past few days. When EMS went to home she was hypotensive hypoxemic. Denies any chills, nausea, vomiting, abdominal pain.     vitals in ED; 90/56, 81, 98.4F, 22, 79% on room air, inc to 94% on NC,  labs Hb 12.1, AG 25, BUN 89, creatinine 3.7 baseline 1.4-1.5 , AST 94, ALt 48, , trops 0.03, BNP 6169, VBG showed metabolic acidosis. lactate of 2.1, UA positive for large LE, CT head and C spine negative for fractures, CT chest and abdomen showed multi station mediastinal lymphadenopathy, right hepatic lobe density, trace left and large right pleural effusion s/p 3 litre LR bolus, vanc, aztreonam, levaquin, calcium gluconate, nebulization. (2019 03:51)      PAST MEDICAL & SURGICAL HISTORY:  Hip fracture  Smoker  Polymyalgia rheumatica  Abdominal aortic aneurysm (AAA) without rupture  Essential hypertension  Falls  TIA (transient ischemic attack)  H/O abdominal aortic aneurysm repair    FAMILY HISTORY:  No pertinent family history in first degree relatives  :  No known cardiovacular family hisotry     ROS:  See HPI     Allergies    Fosamax (Other (Moderate))  ibuprofen (Flushing)  Novacet (Rash)  penicillin (Rash)  Sulfac 10% (Rash)  Tylenol (Pruritus)    PHYSICAL EXAM    ICU Vital Signs Last 24 Hrs  T(C): 36.3 (2019 07:58), Max: 37.3 (2019 20:20)  T(F): 97.3 (2019 07:58), Max: 99.1 (2019 20:20)  HR: 55 (2019 11:01) (55 - 81)  BP: 96/52 (2019 11:01) (90/56 - 142/56)  BP(mean): --  ABP: --  ABP(mean): --  RR: 18 (2019 11:01) (18 - 22)  SpO2: 93% (2019 11:01) (79% - 94%)      General: In NAD   HEENT:  SHAI              Lymphatic system: No cervical LN   Lungs: Decreased breathe sound on right.   Cardiovascular: Regular  Gastrointestinal: Soft, Positive BS  Musculoskeletal: No clubbing.  Moves all extremities.  Full range of motion   Skin: Warm.  Intact  Neurological: No motor or sensory deficit       19 @ 07:01  -  19 @ 07:00  --------------------------------------------------------  IN:    IV PiggyBack: 100 mL    lactated ringers.: 200 mL  Total IN: 300 mL    OUT:  Total OUT: 0 mL    Total NET: 300 mL      LABS:                          11.0   7.57  )-----------( 169      ( 2019 12:02 )             35.6                                               13    139  |  107  |  80<HH>  ----------------------------<  96  5.6<H>   |  15<L>  |  3.1<H>    Ca    10.5<H>      2019 12:02  Mg     2.6     12    TPro  5.6<L>  /  Alb  3.3<L>  /  TBili  0.3  /  DBili  x   /  AST  61<H>  /  ALT  43<H>  /  AlkPhos  182<H>                                               Urinalysis Basic - ( 2019 00:24 )    Color: Yellow / Appearance: Turbid / S.020 / pH: x  Gluc: x / Ketone: Trace  / Bili: Small / Urobili: 0.2 mg/dL   Blood: x / Protein: Trace mg/dL / Nitrite: Negative   Leuk Esterase: Large / RBC: 3-5 /HPF / WBC >50 /HPF   Sq Epi: x / Non Sq Epi: x / Bacteria: Many /HPF        CARDIAC MARKERS ( 2019 12:02 )  x     / 0.01 ng/mL / 168 U/L / x     / 2.5 ng/mL  CARDIAC MARKERS ( 2019 20:35 )  x     / 0.03 ng/mL / 296 U/L / x     / x                                                LIVER FUNCTIONS - ( 2019 12:02 )  Alb: 3.3 g/dL / Pro: 5.6 g/dL / ALK PHOS: 182 U/L / ALT: 43 U/L / AST: 61 U/L / GGT: x                                                                                    X-Rays  : right pleural effusion                                                                                   ECHO    MEDICATIONS  (STANDING):  aztreonam  IVPB 2000 milliGRAM(s) IV Intermittent every 6 hours  cefTRIAXone   IVPB 1 Gram(s) IV Intermittent every 24 hours  chlorhexidine 4% Liquid 1 Application(s) Topical <User Schedule>  heparin  Injectable 5000 Unit(s) SubCutaneous every 8 hours  mirtazapine 7.5 milliGRAM(s) Oral at bedtime  sodium chloride 0.45% 1000 milliLiter(s) (75 mL/Hr) IV Continuous <Continuous>    MEDICATIONS  (PRN):  morphine  - Injectable 2 milliGRAM(s) IV Push every 6 hours PRN Severe Pain (7 - 10)  oxyCODONE    IR 10 milliGRAM(s) Oral three times a day PRN Moderate Pain (4 - 6) Patient is a 83y old  Female who presents with a chief complaint of weakness (2019 10:13)      HPI:    82 Female poor historian with pmhx of HTN, TIA, h/o AAA repair, polymyalgia rheumatica p/w rib pain. she reports right rib pain.  Family was notified possible rib fx??  by PCP via X-ray and was told to got o ED for further w/u.  pt had unknown injury 2-3 weeks ago rib pain started after that. also c/o non productive cough, fever for past few days. When EMS went to home she was hypotensive hypoxemic. Denies any chills, nausea, vomiting, abdominal pain.     vitals in ED; 90/56, 81, 98.4F, 22, 79% on room air, inc to 94% on NC,  labs Hb 12.1, AG 25, BUN 89, creatinine 3.7 baseline 1.4-1.5 , AST 94, ALt 48, , trops 0.03, BNP 6169, VBG showed metabolic acidosis. lactate of 2.1, UA positive for large LE, CT head and C spine negative for fractures, CT chest and abdomen showed multi station mediastinal lymphadenopathy, right hepatic lobe density, trace left and large right pleural effusion s/p 3 litre LR bolus, vanc, aztreonam, levaquin, calcium gluconate, nebulization. (2019 03:51), called to evaluate grandson at bedside, spoke to him at length      PAST MEDICAL & SURGICAL HISTORY:  Hip fracture  Smoker  Polymyalgia rheumatica  Abdominal aortic aneurysm (AAA) without rupture  Essential hypertension  Falls  TIA (transient ischemic attack)  H/O abdominal aortic aneurysm repair  smoker +    ROS:  See HPI     Allergies    Fosamax (Other (Moderate))  ibuprofen (Flushing)  Novacet (Rash)  penicillin (Rash)  Sulfac 10% (Rash)  Tylenol (Pruritus)    PHYSICAL EXAM    ICU Vital Signs Last 24 Hrs  T(C): 36.3 (2019 07:58), Max: 37.3 (2019 20:20)  T(F): 97.3 (2019 07:58), Max: 99.1 (2019 20:20)  HR: 55 (2019 11:01) (55 - 81)  BP: 96/52 (2019 11:01) (90/56 - 142/56)  RR: 18 (2019 11:01) (18 - 22)  SpO2: 93% (2019 11:01) (79% - 94%)      General: In NAD   HEENT:  SHAI , ill looking             Lymphatic system: No cervical LN   Lungs: Decreased breathe sound on right.   Cardiovascular: Regular  Gastrointestinal: Soft, Positive BS  Musculoskeletal: No clubbing.  Moves all extremities.  Full range of motion   Skin: Warm.  Intact  Neurological: No motor or sensory deficit       19 @ 07:01  -  19 @ 07:00  --------------------------------------------------------  IN:    IV PiggyBack: 100 mL    lactated ringers.: 200 mL  Total IN: 300 mL    OUT:  Total OUT: 0 mL    Total NET: 300 mL      LABS:                          11.0   7.57  )-----------( 169      ( 2019 12:02 )             35.6                                               06-13    139  |  107  |  80<HH>  ----------------------------<  96  5.6<H>   |  15<L>  |  3.1<H>    Ca    10.5<H>      2019 12:02  Mg     2.6     12    TPro  5.6<L>  /  Alb  3.3<L>  /  TBili  0.3  /  DBili  x   /  AST  61<H>  /  ALT  43<H>  /  AlkPhos  182<H>  13                                             Urinalysis Basic - ( 2019 00:24 )    Color: Yellow / Appearance: Turbid / S.020 / pH: x  Gluc: x / Ketone: Trace  / Bili: Small / Urobili: 0.2 mg/dL   Blood: x / Protein: Trace mg/dL / Nitrite: Negative   Leuk Esterase: Large / RBC: 3-5 /HPF / WBC >50 /HPF   Sq Epi: x / Non Sq Epi: x / Bacteria: Many /HPF        CARDIAC MARKERS ( 2019 12:02 )  x     / 0.01 ng/mL / 168 U/L / x     / 2.5 ng/mL  CARDIAC MARKERS ( 2019 20:35 )  x     / 0.03 ng/mL / 296 U/L / x     / x                                                LIVER FUNCTIONS - ( 2019 12:02 )  Alb: 3.3 g/dL / Pro: 5.6 g/dL / ALK PHOS: 182 U/L / ALT: 43 U/L / AST: 61 U/L / GGT: x                                                                                    X-Rays  : right pleural effusion           pan CT reviewed                                                                            MEDICATIONS  (STANDING):  aztreonam  IVPB 2000 milliGRAM(s) IV Intermittent every 6 hours  cefTRIAXone   IVPB 1 Gram(s) IV Intermittent every 24 hours  chlorhexidine 4% Liquid 1 Application(s) Topical <User Schedule>  heparin  Injectable 5000 Unit(s) SubCutaneous every 8 hours  mirtazapine 7.5 milliGRAM(s) Oral at bedtime  sodium chloride 0.45% 1000 milliLiter(s) (75 mL/Hr) IV Continuous <Continuous>    MEDICATIONS  (PRN):  morphine  - Injectable 2 milliGRAM(s) IV Push every 6 hours PRN Severe Pain (7 - 10)  oxyCODONE    IR 10 milliGRAM(s) Oral three times a day PRN Moderate Pain (4 - 6)

## 2019-06-13 NOTE — H&P ADULT - NSHPOUTPATIENTPROVIDERS_GEN_ALL_CORE
PCP: Jose Edwards PCP: Jose Edwards  Pharmacy: [place] [id number] [facility] [phone]

## 2019-06-13 NOTE — H&P ADULT - NSHPLABSRESULTS_GEN_ALL_CORE
12.1   8.39  )-----------( 218      ( 12 Jun 2019 20:35 )             39.1       06-12    142  |  105  |  89<HH>  ----------------------------<  60<L>  8.7<HH>   |  12<L>  |  3.7<H>    Ca    10.4<H>      12 Jun 2019 20:35  Mg     2.6     06-12    TPro  6.4  /  Alb  3.5  /  TBili  0.4  /  DBili  x   /  AST  94<H>  /  ALT  48<H>  /  AlkPhos  192<H>  06-12    CARDIAC MARKERS ( 12 Jun 2019 20:35 )  x     / 0.03 ng/mL / 296 U/L / x     / x        Blood Gas Profile - Venous (06.12.19 @ 22:03)    pH, Venous: 7.17: Dr. Ramirez notified [facility] results @ 22:10.    pCO2, Venous: 38 mmHg    pO2, Venous: 45 mmHg    HCO3, Venous: 14 mmoL/L    Base Excess, Venous: -13.8 mmoL/L    Oxygen Saturation, Venous: 71 %    Urinalysis (06.13.19 @ 00:24)    Blood, Urine: Trace    Glucose Qualitative, Urine: Negative mg/dL    pH Urine: 5.5    Color: Yellow    Urine Appearance: Turbid    Bilirubin: Small    Ketone - Urine: Trace    Specific Gravity: 1.020    Protein, Urine: Trace mg/dL    Urobilinogen: 0.2 mg/dL    Nitrite: Negative    Leukocyte Esterase Concentration: Large    < from: CT Abdomen and Pelvis No Cont (06.12.19 @ 22:35) >    IMPRESSION:     Acute fracture of right rib #6. No pneumothorax.    Large right pleura effusion with compressive atelectasis.    No CT evidence of acute intra-abdominal pathology.    ADDITIONAL FINDINGS AFTER ATTENDING REVIEW:  There is multi station mediastinal lymphadenopathy, for example there are   multiple enlarged right paratracheal lymph nodes measuring up to 2.7 x   2.6 cm (series 7, image 111). Subcarinal lymph node measures 3.0 x 2.3 cm.  There is also a trace left pleural effusion.  Centrilobular emphysema.  Questionable 1.7 cm right hepatic lobe hypodensity limited evaluation on   noncontrast imaging (series 7, image 343).  Cholelithiasis.  Age-indeterminate T12 compression deformity, new from 2017.    Given the mediastinal lymphadenopathy and questionable right hepatic lobe   hypodensity as well as asymmetric large right pleural effusion, a   nonemergent contrast-enhanced examination orfurther evaluation/follow-up   is suggested as underlying malignancy is not excluded.    < end of copied text >    < from: CT Cervical Spine No Cont (06.12.19 @ 22:34) >    IMPRESSION:    No acute cervical spine fracture or subluxation.    Partially visualized large right pleural fusion, better characterized on   CT chest, abdomen and pelvis from same day.    < end of copied text >    < from: CT Head No Cont (06.12.19 @ 22:24) >      Impression:      No CT evidence for acute intracranial pathology.     Chronic microvascular ischemic changes.    < end of copied text >

## 2019-06-13 NOTE — CONSULT NOTE ADULT - SUBJECTIVE AND OBJECTIVE BOX
NEPHROLOGY CONSULTATION NOTE      83 yo wf with pmh of htn, aaa repair, tia, pmr, smoker, falls, right hip and pelvic fx last year p/w rib pain and ? injury a few weeks ago.  Found to be hypotensive and hypoxic in ED.  Also with DANIEL.  Given 3L IV bolus in ED.  Now lethargic with chest garglin.  Unable to give history.  Received oxycodone just prior per nursing.  No urine output via primafit.  Renal consulted for DANIEL.  CT imaging shows large right pleural effusion, mediastinal LN and hepatic lesion.  Baseline Cr 1.3        PAST MEDICAL & SURGICAL HISTORY:  Hip fracture  Smoker  Polymyalgia rheumatica  Abdominal aortic aneurysm (AAA) without rupture  Essential hypertension  Falls  TIA (transient ischemic attack)  H/O abdominal aortic aneurysm repair    Allergies:  Fosamax (Other (Moderate))  ibuprofen (Flushing)  Novacet (Rash)  penicillin (Rash)  Sulfac 10% (Rash)  Tylenol (Pruritus)    Home Medications Reviewed    SOCIAL HISTORY:  Denies ETOH,Smoking,   FAMILY HISTORY:  No pertinent family history in first degree relatives        REVIEW OF SYSTEMS:  unobtainable    PHYSICAL EXAM:  ill appearing  lethargic  O2 via NC 91% sat  dry mm  flat jvp  b/l rhonchi, audible wheeze and secretions  montse  firm  + primafit - no uop  no edema    Hospital Medications:   MEDICATIONS  (STANDING):  aztreonam  IVPB 2000 milliGRAM(s) IV Intermittent every 6 hours  cefTRIAXone   IVPB 1 Gram(s) IV Intermittent every 24 hours  chlorhexidine 4% Liquid 1 Application(s) Topical <User Schedule>  gabapentin 100 milliGRAM(s) Oral three times a day  heparin  Injectable 5000 Unit(s) SubCutaneous every 8 hours  lactated ringers. 1000 milliLiter(s) (100 mL/Hr) IV Continuous <Continuous>  mirtazapine 7.5 milliGRAM(s) Oral at bedtime        VITALS:  T(F): 97.3 (19 @ 07:58), Max: 99.1 (19 @ 20:20)  HR: 68 (19 @ 07:58)  BP: 104/56 (19 @ 07:58)  RR: 18 (19 @ 07:58)  SpO2: 93% (19 @ 07:58)  Wt(kg): --     @ 07:01  -  06-13 @ 07:00  --------------------------------------------------------  IN: 300 mL / OUT: 0 mL / NET: 300 mL          LABS:      142  |  105  |  89<HH>  ----------------------------<  60<L>  8.7<HH>   |  12<L>  |  3.7<H>    Ca    10.4<H>      2019 20:35  Mg     2.6         TPro  6.4  /  Alb  3.5  /  TBili  0.4  /  DBili      /  AST  94<H>  /  ALT  48<H>  /  AlkPhos  192<H>                            12.1   8.39  )-----------( 218      ( 2019 20:35 )             39.1       Urine Studies:  Urinalysis Basic - ( 2019 00:24 )    Color: Yellow / Appearance: Turbid / S.020 / pH:   Gluc:  / Ketone: Trace  / Bili: Small / Urobili: 0.2 mg/dL   Blood:  / Protein: Trace mg/dL / Nitrite: Negative   Leuk Esterase: Large / RBC: 3-5 /HPF / WBC >50 /HPF   Sq Epi:  / Non Sq Epi:  / Bacteria: Many /HPF          RADIOLOGY & ADDITIONAL STUDIES:

## 2019-06-13 NOTE — PATIENT PROFILE ADULT - NSPROGENBLOODRESTRICT_GEN_A_NUR
Medication(s) Requested: oxycodone  Last office visit: 11/16/18  Scheduled appointment 3/29/19  Last Dispensed: 2/18/19  Is the patient due for refill of this medication(s): Yes  PDMP review: Criteria met. Forwarded to Physician/NINA for signature.         
Medication: Oxycodone  Sig: take 1 or 2 tablets every 3 hrs as needed  Qty: 300  Dosage: 5/325mg  Last Refill: 02/18/19  [facility] [address] [place] [place] [address]  Patient last seen:  Next appointment to be seen: 03/29/19    
none

## 2019-06-14 LAB
ALBUMIN FLD-MCNC: 2.1 G/DL — SIGNIFICANT CHANGE UP
ALBUMIN SERPL ELPH-MCNC: 3.2 G/DL — LOW (ref 3.5–5.2)
ALP SERPL-CCNC: 179 U/L — HIGH (ref 30–115)
ALT FLD-CCNC: 38 U/L — SIGNIFICANT CHANGE UP (ref 0–41)
ANION GAP SERPL CALC-SCNC: 16 MMOL/L — HIGH (ref 7–14)
ANION GAP SERPL CALC-SCNC: 17 MMOL/L — HIGH (ref 7–14)
AST SERPL-CCNC: 54 U/L — HIGH (ref 0–41)
BASOPHILS # BLD AUTO: 0.04 K/UL — SIGNIFICANT CHANGE UP (ref 0–0.2)
BASOPHILS NFR BLD AUTO: 0.5 % — SIGNIFICANT CHANGE UP (ref 0–1)
BILIRUB DIRECT SERPL-MCNC: 0.3 MG/DL — HIGH (ref 0–0.2)
BILIRUB INDIRECT FLD-MCNC: 0.1 MG/DL — LOW (ref 0.2–1.2)
BILIRUB SERPL-MCNC: 0.4 MG/DL — SIGNIFICANT CHANGE UP (ref 0.2–1.2)
BUN SERPL-MCNC: 84 MG/DL — CRITICAL HIGH (ref 10–20)
BUN SERPL-MCNC: 84 MG/DL — CRITICAL HIGH (ref 10–20)
CALCIUM SERPL-MCNC: 10.1 MG/DL — SIGNIFICANT CHANGE UP (ref 8.5–10.1)
CALCIUM SERPL-MCNC: 10.7 MG/DL — HIGH (ref 8.5–10.1)
CHLORIDE SERPL-SCNC: 102 MMOL/L — SIGNIFICANT CHANGE UP (ref 98–110)
CHLORIDE SERPL-SCNC: 105 MMOL/L — SIGNIFICANT CHANGE UP (ref 98–110)
CO2 SERPL-SCNC: 15 MMOL/L — LOW (ref 17–32)
CO2 SERPL-SCNC: 15 MMOL/L — LOW (ref 17–32)
CREAT SERPL-MCNC: 3 MG/DL — HIGH (ref 0.7–1.5)
CREAT SERPL-MCNC: 3.3 MG/DL — HIGH (ref 0.7–1.5)
EOSINOPHIL # BLD AUTO: 0.19 K/UL — SIGNIFICANT CHANGE UP (ref 0–0.7)
EOSINOPHIL NFR BLD AUTO: 2.2 % — SIGNIFICANT CHANGE UP (ref 0–8)
GLUCOSE BLDC GLUCOMTR-MCNC: 213 MG/DL — HIGH (ref 70–99)
GLUCOSE BLDC GLUCOMTR-MCNC: 68 MG/DL — LOW (ref 70–99)
GLUCOSE FLD-MCNC: 107 MG/DL — SIGNIFICANT CHANGE UP
GLUCOSE SERPL-MCNC: 76 MG/DL — SIGNIFICANT CHANGE UP (ref 70–99)
GLUCOSE SERPL-MCNC: 84 MG/DL — SIGNIFICANT CHANGE UP (ref 70–99)
GRAM STN FLD: SIGNIFICANT CHANGE UP
HCT VFR BLD CALC: 39 % — SIGNIFICANT CHANGE UP (ref 37–47)
HGB BLD-MCNC: 12 G/DL — SIGNIFICANT CHANGE UP (ref 12–16)
IMM GRANULOCYTES NFR BLD AUTO: 1.3 % — HIGH (ref 0.1–0.3)
INR BLD: 1.11 RATIO — SIGNIFICANT CHANGE UP (ref 0.65–1.3)
LDH SERPL L TO P-CCNC: 1306 U/L — SIGNIFICANT CHANGE UP
LYMPHOCYTES # BLD AUTO: 1.47 K/UL — SIGNIFICANT CHANGE UP (ref 1.2–3.4)
LYMPHOCYTES # BLD AUTO: 17 % — LOW (ref 20.5–51.1)
MCHC RBC-ENTMCNC: 29.6 PG — SIGNIFICANT CHANGE UP (ref 27–31)
MCHC RBC-ENTMCNC: 30.8 G/DL — LOW (ref 32–37)
MCV RBC AUTO: 96.3 FL — SIGNIFICANT CHANGE UP (ref 81–99)
MONOCYTES # BLD AUTO: 0.71 K/UL — HIGH (ref 0.1–0.6)
MONOCYTES NFR BLD AUTO: 8.2 % — SIGNIFICANT CHANGE UP (ref 1.7–9.3)
NEUTROPHILS # BLD AUTO: 6.15 K/UL — SIGNIFICANT CHANGE UP (ref 1.4–6.5)
NEUTROPHILS NFR BLD AUTO: 70.8 % — SIGNIFICANT CHANGE UP (ref 42.2–75.2)
NRBC # BLD: 0 /100 WBCS — SIGNIFICANT CHANGE UP (ref 0–0)
PH FLD: 7.5 — SIGNIFICANT CHANGE UP
PHOSPHATE SERPL-MCNC: 4.5 MG/DL — SIGNIFICANT CHANGE UP (ref 2.1–4.9)
PLATELET # BLD AUTO: 157 K/UL — SIGNIFICANT CHANGE UP (ref 130–400)
POTASSIUM SERPL-MCNC: 6 MMOL/L — CRITICAL HIGH (ref 3.5–5)
POTASSIUM SERPL-MCNC: 6.2 MMOL/L — CRITICAL HIGH (ref 3.5–5)
POTASSIUM SERPL-SCNC: 6 MMOL/L — CRITICAL HIGH (ref 3.5–5)
POTASSIUM SERPL-SCNC: 6.2 MMOL/L — CRITICAL HIGH (ref 3.5–5)
PROT FLD-MCNC: 3.2 G/DL — SIGNIFICANT CHANGE UP
PROT SERPL-MCNC: 5.6 G/DL — LOW (ref 6–8)
PROTHROM AB SERPL-ACNC: 12.8 SEC — SIGNIFICANT CHANGE UP (ref 9.95–12.87)
RBC # BLD: 4.05 M/UL — LOW (ref 4.2–5.4)
RBC # FLD: 15.6 % — HIGH (ref 11.5–14.5)
SODIUM SERPL-SCNC: 133 MMOL/L — LOW (ref 135–146)
SODIUM SERPL-SCNC: 137 MMOL/L — SIGNIFICANT CHANGE UP (ref 135–146)
SPECIMEN SOURCE: SIGNIFICANT CHANGE UP
WBC # BLD: 8.67 K/UL — SIGNIFICANT CHANGE UP (ref 4.8–10.8)
WBC # FLD AUTO: 8.67 K/UL — SIGNIFICANT CHANGE UP (ref 4.8–10.8)

## 2019-06-14 PROCEDURE — 99497 ADVNCD CARE PLAN 30 MIN: CPT | Mod: 25

## 2019-06-14 PROCEDURE — 99223 1ST HOSP IP/OBS HIGH 75: CPT

## 2019-06-14 PROCEDURE — 71045 X-RAY EXAM CHEST 1 VIEW: CPT | Mod: 26

## 2019-06-14 PROCEDURE — 99231 SBSQ HOSP IP/OBS SF/LOW 25: CPT

## 2019-06-14 PROCEDURE — 99221 1ST HOSP IP/OBS SF/LOW 40: CPT

## 2019-06-14 RX ORDER — SODIUM CHLORIDE 9 MG/ML
1000 INJECTION, SOLUTION INTRAVENOUS
Refills: 0 | Status: DISCONTINUED | OUTPATIENT
Start: 2019-06-14 | End: 2019-06-15

## 2019-06-14 RX ORDER — DEXTROSE 50 % IN WATER 50 %
50 SYRINGE (ML) INTRAVENOUS ONCE
Refills: 0 | Status: COMPLETED | OUTPATIENT
Start: 2019-06-14 | End: 2019-06-14

## 2019-06-14 RX ORDER — INSULIN LISPRO 100/ML
10 VIAL (ML) SUBCUTANEOUS ONCE
Refills: 0 | Status: DISCONTINUED | OUTPATIENT
Start: 2019-06-14 | End: 2019-06-14

## 2019-06-14 RX ORDER — MORPHINE SULFATE 50 MG/1
2 CAPSULE, EXTENDED RELEASE ORAL EVERY 4 HOURS
Refills: 0 | Status: DISCONTINUED | OUTPATIENT
Start: 2019-06-14 | End: 2019-06-17

## 2019-06-14 RX ORDER — SENNA PLUS 8.6 MG/1
2 TABLET ORAL AT BEDTIME
Refills: 0 | Status: DISCONTINUED | OUTPATIENT
Start: 2019-06-14 | End: 2019-06-18

## 2019-06-14 RX ORDER — INSULIN HUMAN 100 [IU]/ML
5 INJECTION, SOLUTION SUBCUTANEOUS ONCE
Refills: 0 | Status: COMPLETED | OUTPATIENT
Start: 2019-06-14 | End: 2019-06-14

## 2019-06-14 RX ORDER — CLOPIDOGREL BISULFATE 75 MG/1
75 TABLET, FILM COATED ORAL DAILY
Refills: 0 | Status: DISCONTINUED | OUTPATIENT
Start: 2019-06-14 | End: 2019-06-17

## 2019-06-14 RX ORDER — SODIUM POLYSTYRENE SULFONATE 4.1 MEQ/G
30 POWDER, FOR SUSPENSION ORAL ONCE
Refills: 0 | Status: COMPLETED | OUTPATIENT
Start: 2019-06-14 | End: 2019-06-14

## 2019-06-14 RX ORDER — DOCUSATE SODIUM 100 MG
100 CAPSULE ORAL
Refills: 0 | Status: DISCONTINUED | OUTPATIENT
Start: 2019-06-14 | End: 2019-06-18

## 2019-06-14 RX ORDER — INSULIN HUMAN 100 [IU]/ML
10 INJECTION, SOLUTION SUBCUTANEOUS ONCE
Refills: 0 | Status: DISCONTINUED | OUTPATIENT
Start: 2019-06-14 | End: 2019-06-14

## 2019-06-14 RX ADMIN — SODIUM POLYSTYRENE SULFONATE 30 GRAM(S): 4.1 POWDER, FOR SUSPENSION ORAL at 11:45

## 2019-06-14 RX ADMIN — HEPARIN SODIUM 5000 UNIT(S): 5000 INJECTION INTRAVENOUS; SUBCUTANEOUS at 05:06

## 2019-06-14 RX ADMIN — INSULIN HUMAN 5 UNIT(S): 100 INJECTION, SOLUTION SUBCUTANEOUS at 11:27

## 2019-06-14 RX ADMIN — SODIUM CHLORIDE 75 MILLILITER(S): 9 INJECTION, SOLUTION INTRAVENOUS at 09:09

## 2019-06-14 RX ADMIN — Medication 50 MILLILITER(S): at 09:08

## 2019-06-14 RX ADMIN — HEPARIN SODIUM 5000 UNIT(S): 5000 INJECTION INTRAVENOUS; SUBCUTANEOUS at 21:01

## 2019-06-14 RX ADMIN — CEFTRIAXONE 100 GRAM(S): 500 INJECTION, POWDER, FOR SOLUTION INTRAMUSCULAR; INTRAVENOUS at 11:43

## 2019-06-14 RX ADMIN — HEPARIN SODIUM 5000 UNIT(S): 5000 INJECTION INTRAVENOUS; SUBCUTANEOUS at 14:16

## 2019-06-14 RX ADMIN — Medication 100 MILLIGRAM(S): at 05:04

## 2019-06-14 RX ADMIN — CHLORHEXIDINE GLUCONATE 1 APPLICATION(S): 213 SOLUTION TOPICAL at 05:04

## 2019-06-14 NOTE — PROGRESS NOTE ADULT - SUBJECTIVE AND OBJECTIVE BOX
SUBJECTIVE:    Patient is a 83y old Female who presents with a chief complaint of weakness/rib fx (2019 14:41)    Currently admitted to medicine with the primary diagnosis of Rib fracture     Today is hospital day 1d.  This morning she is resting in bed, minimal response.   On nasal canula.    PAST MEDICAL & SURGICAL HISTORY  Hip fracture  Smoker  Polymyalgia rheumatica  Abdominal aortic aneurysm (AAA) without rupture  Essential hypertension  Falls  TIA (transient ischemic attack)  H/O abdominal aortic aneurysm repair    SOCIAL HISTORY:  Negative for smoking/alcohol/drug use.     ALLERGIES:  Fosamax (Other (Moderate))  ibuprofen (Flushing)  Novacet (Rash)  penicillin (Rash)  Sulfac 10% (Rash)  Tylenol (Pruritus)    MEDICATIONS:  STANDING MEDICATIONS  cefTRIAXone   IVPB 1 Gram(s) IV Intermittent every 24 hours  chlorhexidine 4% Liquid 1 Application(s) Topical <User Schedule>  clopidogrel Tablet 75 milliGRAM(s) Oral daily  dextrose 5% + sodium chloride 0.45% 1000 milliLiter(s) IV Continuous <Continuous>  docusate sodium 100 milliGRAM(s) Oral two times a day  heparin  Injectable 5000 Unit(s) SubCutaneous every 8 hours  mirtazapine 7.5 milliGRAM(s) Oral at bedtime  senna 2 Tablet(s) Oral at bedtime    PRN MEDICATIONS  morphine  - Injectable 1 milliGRAM(s) IV Push every 6 hours PRN  oxyCODONE    IR 10 milliGRAM(s) Oral three times a day PRN    VITALS:   T(F): 97  HR: 75  BP: 92/55  RR: 20  SpO2: 92%    LABS:                        12.0   8.67  )-----------( 157      ( 2019 06:08 )             39.0     06-14    137  |  105  |  84<HH>  ----------------------------<  76  6.0<HH>   |  15<L>  |  3.3<H>    Ca    10.7<H>      2019 06:08  Mg     2.6     06-12    TPro  5.6<L>  /  Alb  3.2<L>  /  TBili  0.4  /  DBili  0.3<H>  /  AST  54<H>  /  ALT  38  /  AlkPhos  179<H>      PT/INR - ( 2019 06:08 )   PT: 12.80 sec;   INR: 1.11 ratio           Urinalysis Basic - ( 2019 00:24 )    Color: Yellow / Appearance: Turbid / S.020 / pH: x  Gluc: x / Ketone: Trace  / Bili: Small / Urobili: 0.2 mg/dL   Blood: x / Protein: Trace mg/dL / Nitrite: Negative   Leuk Esterase: Large / RBC: 3-5 /HPF / WBC >50 /HPF   Sq Epi: x / Non Sq Epi: x / Bacteria: Many /HPF            Culture - Fungal, Body Fluid (collected 2019 14:25)  Source: .Body Fluid Pleural Fluid  Preliminary Report (2019 13:58):    Testing in progress    Culture - Body Fluid with Gram Stain (collected 2019 14:25)  Source: .Body Fluid Pleural Fluid  Gram Stain (2019 06:12):    polymorphonuclear leukocytes seen    No organisms seen    by cytocentrifuge    Culture - Urine (collected 2019 00:24)  Source: .Urine Catheterized  Preliminary Report (2019 15:08):    >100,000 CFU/ml Gram Negative Rods      CARDIAC MARKERS ( 2019 12:02 )  x     / 0.01 ng/mL / 168 U/L / x     / 2.5 ng/mL  CARDIAC MARKERS ( 2019 20:35 )  x     / 0.03 ng/mL / 296 U/L / x     / x          RADIOLOGY:      CT Abdomen and Pelvis No Cont (19 @ 22:35)    Acute fracture of right rib #6. No pneumothorax.   Large right pleura effusion with compressive atelectasis.   No CT evidence of acute intra-abdominal pathology.   There is multi station mediastinal lymphadenopathy, for example there are   multiple enlarged right paratracheal lymph nodes measuring up to 2.7 x   2.6 cm (series 7, image 111). Subcarinal lymph node measures 3.0 x 2.3 cm.  There is also a trace left pleural effusion.  Centrilobular emphysema.  Questionable 1.7 cm right hepatic lobe hypodensity limited evaluation on   noncontrast imaging (series 7, image 343).  Cholelithiasis.  Age-indeterminate T12 compression deformity, new from 2017.   CT Cervical Spine No Cont (19 @ 22:34)    IMPRESSION:   No acute cervical spine fracture or subluxation.   Partially visualized large right pleural fusion, better characterized on   CT chest, abdomen and pelvis from same day.   CT Head No Cont (19 @ 22:24)     Impression:     No CT evidence for acute intracranial pathology.    Chronic microvascular ischemic changes.  	    PHYSICAL EXAM:    GENERAL: in distress, minimal response, on nasal canula  	HEAD:  Atraumatic, Normocephalic  	CHEST/LUNG: b/l rhonchi, decrease air entry over left lower lung field  	HEART: Regular rate and rhythm; No murmurs;   	ABDOMEN: Soft, Nontender, Nondistended; No guarding  	EXTREMITIES: No edema    Neuro: AOx1 knows name, place, unable to assess as patient is not following commands

## 2019-06-14 NOTE — CONSULT NOTE ADULT - SUBJECTIVE AND OBJECTIVE BOX
REQUESTED OF: DR Fragoso ([facility])     CLINICAL ISSUE TO BE EVALUATED BY CONSULTANT: Goals of care and symptom management        MAURICIO KONG 83yFemale  HPI:  History obtained from chart and patient called the numbers in the chart went to voicemail no family at bedside.   82 Female poor historian with pmhx of HTN, TIA, h/o AAA repair, polymyalgia rheumatica p/w rib pain. As per patient she lives alone.   as per ED chart she presented to the hospital for right rib pain.  Family was notified possible rib fx  by PCP via X-ray and was told to got o ED for further w/u. As per son, pt had unknown injury 2-3 weeks ago rib pain started after that. also c/o non productive cough, fever for past few days. When EMS went to home she was hypotensive hypoxemic. Denies any chills, nausea, vomiting, abdominal pain.     vitals in ED; 90/56, 81, 98.4F, 22, 79% on room air, inc to 94% on NC,  labs Hb 12.1, AG 25, BUN 89, creatinine 3.7 baseline 1.4-1.5 , AST 94, ALt 48, , trops 0.03, BNP 6169, VBG showed metabolic acidosis. lactate of 2.1, UA positive for large LE, CT head and C spine negative for fractures, CT chest and abdomen showed multi station mediastinal lymphadenopathy, right hepatic lobe density, trace left and large right pleural effusion s/p 3 litre LR bolus, vanc, aztreonam, levaquin, calcium gluconate, nebulization. (13 Jun 2019 03:51)        PAST MEDICAL & SURGICAL HISTORY:  Hip fracture  Smoker  Polymyalgia rheumatica  Abdominal aortic aneurysm (AAA) without rupture  Essential hypertension  Falls  TIA (transient ischemic attack)  H/O abdominal aortic aneurysm repair        PHYSICAL EXAM:      Constitutional: cachectic female who appears critically ill and weak     Respiratory: no dyspnea     Cardiovascular: (-) JVD     Gastrointestinal: soft flat NT (+) BS Q 4 quadrants     Genitourinary: ugarte catheter to straight drainage     Extremities: no edema     Neurological: pt awakens to verbal and tactile stimuli, oriented to name     Skin: intact          T(C): 36.1, Max: 37.4 (22:07)  HR: 71 (58 - 77)  BP: 104/53 (76/42 - 118/57)  RR: 20 (20 - 20)  SpO2: 92% (92% - 94%)      LABS/STUDIES:  06-14    137  |  105  |  84<HH>  ----------------------------<  76  6.0<HH>   |  15<L>  |  3.3<H>    Ca    10.7<H>      14 Jun 2019 06:08  Mg     2.6     06-12    TPro  5.6<L>  /  Alb  3.2<L>  /  TBili  0.4  /  DBili  0.3<H>  /  AST  54<H>  /  ALT  38  /  AlkPhos  179<H>  06-14                            12.0   8.67  )-----------( 157      ( 14 Jun 2019 06:08 )             39.0       MEDICATIONS  (STANDING):  cefTRIAXone   IVPB 1 Gram(s) IV Intermittent every 24 hours  chlorhexidine 4% Liquid 1 Application(s) Topical <User Schedule>  clopidogrel Tablet 75 milliGRAM(s) Oral daily  dextrose 5% + sodium chloride 0.45% 1000 milliLiter(s) (75 mL/Hr) IV Continuous <Continuous>  docusate sodium 100 milliGRAM(s) Oral two times a day  heparin  Injectable 5000 Unit(s) SubCutaneous every 8 hours  mirtazapine 7.5 milliGRAM(s) Oral at bedtime  senna 2 Tablet(s) Oral at bedtime    MEDICATIONS  (PRN):  morphine  - Injectable 1 milliGRAM(s) IV Push every 6 hours PRN Severe Pain (7 - 10)  oxyCODONE    IR 10 milliGRAM(s) Oral three times a day PRN Moderate Pain (4 - 6)          PPS (Palliative Performance Scale): 20

## 2019-06-14 NOTE — PROGRESS NOTE ADULT - ASSESSMENT
DANIEL  -due to ischemic ATN  -oliguric  -slight improvement since admission  CKD 3  -Cr 1.3 10/2018 baseline  hypotension with baseline HTN  ARF  large right pleural effusion  metabolic acidosis  hyperkalemia  hypercalcemia  UTI  r/o PNA  likely metastatic lung cancer  AAA repair   PMR    plan:    spoke with daughter in depth, she is requesting no dialysis and prefers a palliative care focus with hospice eval  change ivf to d51/2ns with nahco3 75meq/l @ 75 cc/hr  off labetalol, losartan, amlodipine, gabapentin  keyexalate prn if able to take PO  no dialysis  cont abx  DNR / DNI  palliative care eval  hospice eval  prognosis poor

## 2019-06-14 NOTE — SWALLOW BEDSIDE ASSESSMENT ADULT - ORAL PREPARATORY PHASE
Anterior loss of bolus/Reduced oral grading/Bolus falls into anterior sulcus Bolus falls into anterior sulcus/Reduced oral grading/Bolus falls into left lateral sulci

## 2019-06-14 NOTE — SWALLOW BEDSIDE ASSESSMENT ADULT - ORAL PHASE
Decreased anterior-posterior movement of the bolus/Stasis in anterior sulcus/Delayed oral transit time bolus hold/Decreased anterior-posterior movement of the bolus

## 2019-06-14 NOTE — PROGRESS NOTE ADULT - ASSESSMENT
Ms. Prado is a 82 female poor historian with pmhx of HTN, TIA, h/o AAA repair, polymyalgia rheumatica presenting with rib pain after fall 2-3 weeks ago    # Exudative Right pleural effusion with mediastinal lymphadenopathy and right hepatic lobe hypodensity probably malignancy  -  CT Chest No Cont: multiple enlarged right paratracheal lymph nodes measuring up to 2.7 x 2.6 cm. Subcarinal lymph node measures 3.0 x 2.3 cm. Questionable 1.7 cm right hepatic lobe hypodensity. Age-indeterminate T12 compression deformity, new from 2017. Large right pleura effusion with compressive atelectasis.   - S/p thoracentesis on 6/13 with exudative fluid, cytology pending  - evaluated by pulm given hypotension and desaturation, supplemental O2 maintain oxygen >92  - will continue ceftriaxone pending culture results    #) UTI  - positive UA  - urien culture pending  - on ceftriaxone    # DANIEL on top of CKD stage 3 + hyperkalemia  -likely due to ischemic ATN (relative hypotension with baseline hypertension)  -Cr 1.3 10/2018 baseline  -oliguric  -slight improvement since admission  - will continue IV hydration with D1/2 NSS + NaHCO3  - Followed by Dr. Holt, family refusing dialysis    #)HAGMA, elevated lactate,   - might be due to DANIEL, or lactic acidosis  -repeat lactate  -check urine lytes  -renal US (refused by family)    #) Hyperkalemia  - S/p dextrose/insulin  - s/p rectal kayexalate  - monitor K    #) Acute fracture of right 6th rib s/p fall  -pain control  -incentive spirometry    #)TIA  -will resume plavix     #) H/o Hypertension- pt with low BP  - hold norvac, labetalol, losatrtan    #)Diet: failed swallow evaluation. Will keep NPO. family refusing NG feeding or PEG  or comfort feeds  #)DVT ppx: Hep SC  #)GI ppx: Not indicated  #)Dispo; From home as per patient  #)Code; DNR, DNI, no PEG tube/NGT, no dialysis.

## 2019-06-14 NOTE — CONSULT NOTE ADULT - ATTENDING COMMENTS
no acute traumatic injuries  will follow 24h for tertiary
Pt seen, assessment as above. Overall poor prognosis understood. Family wish to continue medical mngmnt for now with no aggressive/invasive measures. May consider comfort measures only once other daughter arrives on Monday.   considering home hospice services upon completion of ABx if pt remains stable next week.     We will follow  [phone]

## 2019-06-14 NOTE — PROGRESS NOTE ADULT - SUBJECTIVE AND OBJECTIVE BOX
OVERNIGHT EVENTS: events noted, fluid reviewed    Vital Signs Last 24 Hrs  T(C): 36.1 (2019 07:53), Max: 37.4 (2019 22:07)  T(F): 97 (2019 07:53), Max: 99.3 (2019 22:07)  HR: 71 (2019 07:53) (58 - 77)  BP: 104/53 (2019 07:53) (76/42 - 118/57)  BP(mean): 72 (2019 07:53) (72 - 82)  RR: 20 (2019 07:53) (20 - 20)  SpO2: 92% (2019 19:56) (92% - 94%)    PHYSICAL EXAMINATION:    GENERAL: ill looking    HEENT: Head is normocephalic and atraumatic. Extraocular muscles are intact. Mucous membranes are moist.    NECK: Supple.    LUNGS: dec bs r side    HEART: Regular rate and rhythm without murmur.    ABDOMEN: Soft, nontender, and nondistended.      EXTREMITIES: Without any cyanosis, clubbing, rash, lesions or edema.    NEUROLOGIC: Grossly intact.    SKIN: No ulceration or induration present.      LABS:                        12.0   8.67  )-----------( 157      ( 2019 06:08 )             39.0     06-14    137  |  105  |  84<HH>  ----------------------------<  76  6.0<HH>   |  15<L>  |  3.3<H>    Ca    10.7<H>      2019 06:08  Mg     2.6     06-12    TPro  5.6<L>  /  Alb  3.2<L>  /  TBili  0.4  /  DBili  0.3<H>  /  AST  54<H>  /  ALT  38  /  AlkPhos  179<H>  -14    PT/INR - ( 2019 06:08 )   PT: 12.80 sec;   INR: 1.11 ratio           Urinalysis Basic - ( 2019 00:24 )    Color: Yellow / Appearance: Turbid / S.020 / pH: x  Gluc: x / Ketone: Trace  / Bili: Small / Urobili: 0.2 mg/dL   Blood: x / Protein: Trace mg/dL / Nitrite: Negative   Leuk Esterase: Large / RBC: 3-5 /HPF / WBC >50 /HPF   Sq Epi: x / Non Sq Epi: x / Bacteria: Many /HPF        CARDIAC MARKERS ( 2019 12:02 )  x     / 0.01 ng/mL / 168 U/L / x     / 2.5 ng/mL  CARDIAC MARKERS ( 2019 20:35 )  x     / 0.03 ng/mL / 296 U/L / x     / x            Serum Pro-Brain Natriuretic Peptide: 6169 pg/mL (19 @ 20:35)    Lactate, Blood: 2.0 mmol/L (19 @ 12:02)          19 @ 07:01  -  19 @ 07:00  --------------------------------------------------------  IN: 0 mL / OUT: 400 mL / NET: -400 mL        MICROBIOLOGY:      MEDICATIONS  (STANDING):  cefTRIAXone   IVPB 1 Gram(s) IV Intermittent every 24 hours  chlorhexidine 4% Liquid 1 Application(s) Topical <User Schedule>  clopidogrel Tablet 75 milliGRAM(s) Oral daily  dextrose 5% + sodium chloride 0.45% 1000 milliLiter(s) (75 mL/Hr) IV Continuous <Continuous>  docusate sodium 100 milliGRAM(s) Oral two times a day  heparin  Injectable 5000 Unit(s) SubCutaneous every 8 hours  mirtazapine 7.5 milliGRAM(s) Oral at bedtime  senna 2 Tablet(s) Oral at bedtime    MEDICATIONS  (PRN):  morphine  - Injectable 1 milliGRAM(s) IV Push every 6 hours PRN Severe Pain (7 - 10)  oxyCODONE    IR 10 milliGRAM(s) Oral three times a day PRN Moderate Pain (4 - 6)      RADIOLOGY & ADDITIONAL STUDIES:

## 2019-06-14 NOTE — CONSULT NOTE ADULT - ASSESSMENT
83yFemale being evaluated for goals of care. Pt came in for increasing weakness and rib pain. (+) 6th rib fx noted, h/o hip fx x2. Pt also (+) liver mass on CT scan and left sided lung effusions and nodes c/w malignancy. Pt appears very weak, barely able to speak and then goes back to  sleep. Speech and swallow saying only safe for comfort feeding if not NPO. No family at bedside          Recommendations:  DNR/DNI 83yFemale being evaluated for goals of care. Pt came in for increasing weakness and rib pain. (+) 6th rib fx noted, h/o hip fx x2. Pt also (+) liver mass on CT scan and left sided lung effusions and nodes c/w malignancy. Pt appears very weak, barely able to speak and then goes back to  sleep. Speech and swallow saying only safe for comfort feeding if not NPO. No family at bedside    Attempted to call daughter Bria Garcia number provided by unit  - [phone] and [phone]   MOLST reviewed needs to be done with correct surrogate - the daughter.     Recommendations:  DNR/DNI 83yFemale being evaluated for goals of care. Pt came in for increasing weakness and rib pain. (+) 6th rib fx noted, h/o hip fx x2. Pt also (+) liver mass on CT scan and left sided lung effusions and nodes c/w malignancy. Pt appears very weak, barely able to speak and then goes back to  sleep. Speech and swallow saying only safe for comfort feeding if not NPO. No family at bedside    Attempted to call kary Garcia number provided by unit SW - [phone] and [phone]   MOLST reviewed needs to be done with correct surrogate - the daughter.     Met with kary Fraser with palliative NP and SW  at 3:15pm - time spent 35 min  Recommendations:  DNR/DNI  No feeding tubes  No escalation of care  IVF/IV ABT until meeting on 6/14/19 at 1:30pm with palliative care an pt's other daughter Franki   at that time will re-evaluate IVF and IV ABT   Morphine 2mg IV Q 4 HRS PRN for pain   D/C oxy 83yFemale being evaluated for goals of care. Pt came in for increasing weakness and rib pain. (+) 6th rib fx noted, h/o hip fx x2. Pt also (+) liver mass on CT scan and left sided lung effusions and nodes c/w malignancy. Pt appears very weak, barely able to speak and then goes back to  sleep. Speech and swallow saying only safe for comfort feeding if not NPO. No family at bedside    Kary Garcia:- [phone] and [phone]    Met with kary Fraser with palliative NP and SW  at 3:15pm - time spent 35 min    Recommendations:  DNR/DNI  No feeding tubes  No escalation of care  IVF/IV ABT until meeting on 6/17/19 at 1:30pm with palliative care an pt's other daughter Franki   at that time will re-evaluate IVF and IV ABT   Morphine 2mg IV Q 4 HRS PRN for pain   D/C oxy

## 2019-06-14 NOTE — SWALLOW BEDSIDE ASSESSMENT ADULT - SWALLOW EVAL: RECOMMENDED DIET
NPO, pending family discussion re: goals of care and comfort feeds, if comfort feeds desired, consider puree w/ nectar thick liquids knowing the risks for aspiration

## 2019-06-14 NOTE — PROGRESS NOTE ADULT - ASSESSMENT
Impression:    Right pleural effusion: Mediastinal Lymph nodes with possible liver mets. Most likely malignant effusion. Heavy smoker +weight loss  Acute renal failure   Failure to thrive.     PLAN:    CNS: NO sedation    HEENT: Oral care    PULMONARY:  HOB @ 45 degrees. f/up cyto    CARDIOVASCULAR: Continue with iv fluid for now.    GI: GI prophylaxis.  Feeding as tolerated. Calorie count. Encourage feed. Nutrition evaluation. Add appetite stimulant if needed.     RENAL:  Follow up lytes.  Correct as needed. Continue with monitor urine output. Follow with Renal.     INFECTIOUS DISEASE: Follow up cultures. No indication for Abx at this time.     HEMATOLOGICAL:  DVT prophylaxis.    ENDOCRINE:  Follow up FS.  Insulin protocol if needed.    MUSCULOSKELETAL: Out of bed to chair    DNR/DNI  Not a candidate for unit.  Palliative care negro.   spoke with grandson at bedside

## 2019-06-14 NOTE — PROGRESS NOTE ADULT - SUBJECTIVE AND OBJECTIVE BOX
NEPHROLOGY FOLLOW UP NOTE    pt seen and examined  low bp's overnight   still with poor urine output  remains on bicarb gtt  no fevers  poorly responsive, not eating          PAST MEDICAL & SURGICAL HISTORY:  Hip fracture  Smoker  Polymyalgia rheumatica  Abdominal aortic aneurysm (AAA) without rupture  Essential hypertension  Falls  TIA (transient ischemic attack)  H/O abdominal aortic aneurysm repair    Allergies:  Fosamax (Other (Moderate))  ibuprofen (Flushing)  Novacet (Rash)  penicillin (Rash)  Sulfac 10% (Rash)  Tylenol (Pruritus)    Home Medications Reviewed    SOCIAL HISTORY:  Denies ETOH,Smoking,   FAMILY HISTORY:  No pertinent family history in first degree relatives        REVIEW OF SYSTEMS:  poor historian    advance care planning and advance care directives reviewed and discussed in detail    PHYSICAL EXAM:  ill appearing  lethargic  O2 via NC  dry mm  flat jvp  b/l rhonchi  montse  firm  + ugarte  no edema    Hospital Medications:   MEDICATIONS  (STANDING):  cefTRIAXone   IVPB 1 Gram(s) IV Intermittent every 24 hours  chlorhexidine 4% Liquid 1 Application(s) Topical <User Schedule>  clopidogrel Tablet 75 milliGRAM(s) Oral daily  dextrose 5% + sodium chloride 0.45% 1000 milliLiter(s) (75 mL/Hr) IV Continuous <Continuous>  docusate sodium 100 milliGRAM(s) Oral two times a day  heparin  Injectable 5000 Unit(s) SubCutaneous every 8 hours  mirtazapine 7.5 milliGRAM(s) Oral at bedtime  senna 2 Tablet(s) Oral at bedtime  sodium polystyrene sulfonate Suspension 30 Gram(s) Oral once        VITALS:  T(F): 97 (19 @ 07:53), Max: 99.3 (19 @ 22:07)  HR: 71 (19 @ 07:53)  BP: 104/53 (19 @ 07:53)  RR: 20 (19 @ 07:53)  SpO2: 92% (19 @ 19:56)  Wt(kg): --     @ 07:01  -   @ 07:00  --------------------------------------------------------  IN: 300 mL / OUT: 0 mL / NET: 300 mL     @ 07:01  -   @ 07:00  --------------------------------------------------------  IN: 0 mL / OUT: 400 mL / NET: -400 mL          LABS:      137  |  105  |  84<HH>  ----------------------------<  76  6.0<HH>   |  15<L>  |  3.3<H>    Ca    10.7<H>      2019 06:08  Mg     2.6         TPro  5.6<L>  /  Alb  3.2<L>  /  TBili  0.4  /  DBili  0.3<H>  /  AST  54<H>  /  ALT  38  /  AlkPhos  179<H>                            12.0   8.67  )-----------( 157      ( 2019 06:08 )             39.0       Urine Studies:  Urinalysis Basic - ( 2019 00:24 )    Color: Yellow / Appearance: Turbid / S.020 / pH:   Gluc:  / Ketone: Trace  / Bili: Small / Urobili: 0.2 mg/dL   Blood:  / Protein: Trace mg/dL / Nitrite: Negative   Leuk Esterase: Large / RBC: 3-5 /HPF / WBC >50 /HPF   Sq Epi:  / Non Sq Epi:  / Bacteria: Many /HPF          RADIOLOGY & ADDITIONAL STUDIES:

## 2019-06-15 LAB
-  AMIKACIN: SIGNIFICANT CHANGE UP
-  AMPICILLIN/SULBACTAM: SIGNIFICANT CHANGE UP
-  AMPICILLIN: SIGNIFICANT CHANGE UP
-  AZTREONAM: SIGNIFICANT CHANGE UP
-  CEFEPIME: SIGNIFICANT CHANGE UP
-  CEFOXITIN: SIGNIFICANT CHANGE UP
-  CEFTRIAXONE: SIGNIFICANT CHANGE UP
-  CIPROFLOXACIN: SIGNIFICANT CHANGE UP
-  ERTAPENEM: SIGNIFICANT CHANGE UP
-  GENTAMICIN: SIGNIFICANT CHANGE UP
-  IMIPENEM: SIGNIFICANT CHANGE UP
-  LEVOFLOXACIN: SIGNIFICANT CHANGE UP
-  MEROPENEM: SIGNIFICANT CHANGE UP
-  NITROFURANTOIN: SIGNIFICANT CHANGE UP
-  PIPERACILLIN/TAZOBACTAM: SIGNIFICANT CHANGE UP
-  TIGECYCLINE: SIGNIFICANT CHANGE UP
-  TOBRAMYCIN: SIGNIFICANT CHANGE UP
-  TRIMETHOPRIM/SULFAMETHOXAZOLE: SIGNIFICANT CHANGE UP
ALBUMIN SERPL ELPH-MCNC: 2.6 G/DL — LOW (ref 3.5–5.2)
ALP SERPL-CCNC: 131 U/L — HIGH (ref 30–115)
ALT FLD-CCNC: 28 U/L — SIGNIFICANT CHANGE UP (ref 0–41)
ANION GAP SERPL CALC-SCNC: 13 MMOL/L — SIGNIFICANT CHANGE UP (ref 7–14)
ANION GAP SERPL CALC-SCNC: 15 MMOL/L — HIGH (ref 7–14)
AST SERPL-CCNC: 47 U/L — HIGH (ref 0–41)
BASOPHILS # BLD AUTO: 0.02 K/UL — SIGNIFICANT CHANGE UP (ref 0–0.2)
BASOPHILS NFR BLD AUTO: 0.3 % — SIGNIFICANT CHANGE UP (ref 0–1)
BILIRUB SERPL-MCNC: 0.4 MG/DL — SIGNIFICANT CHANGE UP (ref 0.2–1.2)
BUN SERPL-MCNC: 76 MG/DL — CRITICAL HIGH (ref 10–20)
BUN SERPL-MCNC: 78 MG/DL — CRITICAL HIGH (ref 10–20)
CALCIUM SERPL-MCNC: 10.9 MG/DL — HIGH (ref 8.5–10.1)
CALCIUM SERPL-MCNC: 10.9 MG/DL — HIGH (ref 8.5–10.1)
CHLORIDE SERPL-SCNC: 105 MMOL/L — SIGNIFICANT CHANGE UP (ref 98–110)
CHLORIDE SERPL-SCNC: 105 MMOL/L — SIGNIFICANT CHANGE UP (ref 98–110)
CO2 SERPL-SCNC: 18 MMOL/L — SIGNIFICANT CHANGE UP (ref 17–32)
CO2 SERPL-SCNC: 22 MMOL/L — SIGNIFICANT CHANGE UP (ref 17–32)
CREAT SERPL-MCNC: 2.5 MG/DL — HIGH (ref 0.7–1.5)
CREAT SERPL-MCNC: 2.8 MG/DL — HIGH (ref 0.7–1.5)
CULTURE RESULTS: SIGNIFICANT CHANGE UP
EOSINOPHIL # BLD AUTO: 0.11 K/UL — SIGNIFICANT CHANGE UP (ref 0–0.7)
EOSINOPHIL NFR BLD AUTO: 1.5 % — SIGNIFICANT CHANGE UP (ref 0–8)
GLUCOSE SERPL-MCNC: 92 MG/DL — SIGNIFICANT CHANGE UP (ref 70–99)
GLUCOSE SERPL-MCNC: 94 MG/DL — SIGNIFICANT CHANGE UP (ref 70–99)
HCT VFR BLD CALC: 32.3 % — LOW (ref 37–47)
HGB BLD-MCNC: 10.6 G/DL — LOW (ref 12–16)
IMM GRANULOCYTES NFR BLD AUTO: 1.9 % — HIGH (ref 0.1–0.3)
LACTATE SERPL-SCNC: 2.3 MMOL/L — HIGH (ref 0.5–2.2)
LYMPHOCYTES # BLD AUTO: 0.94 K/UL — LOW (ref 1.2–3.4)
LYMPHOCYTES # BLD AUTO: 12.8 % — LOW (ref 20.5–51.1)
MAGNESIUM SERPL-MCNC: 1.8 MG/DL — SIGNIFICANT CHANGE UP (ref 1.8–2.4)
MCHC RBC-ENTMCNC: 30.1 PG — SIGNIFICANT CHANGE UP (ref 27–31)
MCHC RBC-ENTMCNC: 32.8 G/DL — SIGNIFICANT CHANGE UP (ref 32–37)
MCV RBC AUTO: 91.8 FL — SIGNIFICANT CHANGE UP (ref 81–99)
METHOD TYPE: SIGNIFICANT CHANGE UP
MONOCYTES # BLD AUTO: 0.62 K/UL — HIGH (ref 0.1–0.6)
MONOCYTES NFR BLD AUTO: 8.4 % — SIGNIFICANT CHANGE UP (ref 1.7–9.3)
NEUTROPHILS # BLD AUTO: 5.52 K/UL — SIGNIFICANT CHANGE UP (ref 1.4–6.5)
NEUTROPHILS NFR BLD AUTO: 75.1 % — SIGNIFICANT CHANGE UP (ref 42.2–75.2)
NRBC # BLD: 0 /100 WBCS — SIGNIFICANT CHANGE UP (ref 0–0)
ORGANISM # SPEC MICROSCOPIC CNT: SIGNIFICANT CHANGE UP
ORGANISM # SPEC MICROSCOPIC CNT: SIGNIFICANT CHANGE UP
PLATELET # BLD AUTO: 152 K/UL — SIGNIFICANT CHANGE UP (ref 130–400)
POTASSIUM SERPL-MCNC: 4.6 MMOL/L — SIGNIFICANT CHANGE UP (ref 3.5–5)
POTASSIUM SERPL-MCNC: 4.8 MMOL/L — SIGNIFICANT CHANGE UP (ref 3.5–5)
POTASSIUM SERPL-SCNC: 4.6 MMOL/L — SIGNIFICANT CHANGE UP (ref 3.5–5)
POTASSIUM SERPL-SCNC: 4.8 MMOL/L — SIGNIFICANT CHANGE UP (ref 3.5–5)
PROT SERPL-MCNC: 4.8 G/DL — LOW (ref 6–8)
RBC # BLD: 3.52 M/UL — LOW (ref 4.2–5.4)
RBC # FLD: 15.2 % — HIGH (ref 11.5–14.5)
SODIUM SERPL-SCNC: 138 MMOL/L — SIGNIFICANT CHANGE UP (ref 135–146)
SODIUM SERPL-SCNC: 140 MMOL/L — SIGNIFICANT CHANGE UP (ref 135–146)
SPECIMEN SOURCE: SIGNIFICANT CHANGE UP
WBC # BLD: 7.35 K/UL — SIGNIFICANT CHANGE UP (ref 4.8–10.8)
WBC # FLD AUTO: 7.35 K/UL — SIGNIFICANT CHANGE UP (ref 4.8–10.8)

## 2019-06-15 PROCEDURE — 99233 SBSQ HOSP IP/OBS HIGH 50: CPT

## 2019-06-15 RX ORDER — SODIUM CHLORIDE 9 MG/ML
1000 INJECTION INTRAMUSCULAR; INTRAVENOUS; SUBCUTANEOUS
Refills: 0 | Status: DISCONTINUED | OUTPATIENT
Start: 2019-06-15 | End: 2019-06-17

## 2019-06-15 RX ADMIN — CHLORHEXIDINE GLUCONATE 1 APPLICATION(S): 213 SOLUTION TOPICAL at 05:53

## 2019-06-15 RX ADMIN — HEPARIN SODIUM 5000 UNIT(S): 5000 INJECTION INTRAVENOUS; SUBCUTANEOUS at 21:25

## 2019-06-15 RX ADMIN — OXYCODONE HYDROCHLORIDE 10 MILLIGRAM(S): 5 TABLET ORAL at 08:37

## 2019-06-15 RX ADMIN — CEFTRIAXONE 100 GRAM(S): 500 INJECTION, POWDER, FOR SOLUTION INTRAMUSCULAR; INTRAVENOUS at 08:37

## 2019-06-15 RX ADMIN — MORPHINE SULFATE 2 MILLIGRAM(S): 50 CAPSULE, EXTENDED RELEASE ORAL at 03:49

## 2019-06-15 RX ADMIN — MORPHINE SULFATE 2 MILLIGRAM(S): 50 CAPSULE, EXTENDED RELEASE ORAL at 17:50

## 2019-06-15 RX ADMIN — SODIUM CHLORIDE 75 MILLILITER(S): 9 INJECTION, SOLUTION INTRAVENOUS at 03:21

## 2019-06-15 RX ADMIN — MORPHINE SULFATE 1 MILLIGRAM(S): 50 CAPSULE, EXTENDED RELEASE ORAL at 08:36

## 2019-06-15 RX ADMIN — MORPHINE SULFATE 2 MILLIGRAM(S): 50 CAPSULE, EXTENDED RELEASE ORAL at 03:16

## 2019-06-15 RX ADMIN — HEPARIN SODIUM 5000 UNIT(S): 5000 INJECTION INTRAVENOUS; SUBCUTANEOUS at 17:41

## 2019-06-15 RX ADMIN — HEPARIN SODIUM 5000 UNIT(S): 5000 INJECTION INTRAVENOUS; SUBCUTANEOUS at 05:54

## 2019-06-15 RX ADMIN — SODIUM CHLORIDE 50 MILLILITER(S): 9 INJECTION INTRAMUSCULAR; INTRAVENOUS; SUBCUTANEOUS at 17:45

## 2019-06-15 RX ADMIN — MORPHINE SULFATE 2 MILLIGRAM(S): 50 CAPSULE, EXTENDED RELEASE ORAL at 19:35

## 2019-06-15 NOTE — PROGRESS NOTE ADULT - ASSESSMENT
Ms. Prado is a 82 female poor historian with pmhx of HTN, TIA, h/o AAA repair, polymyalgia rheumatica presenting with rib pain after fall 2-3 weeks ago    # Exudative Right pleural effusion with mediastinal lymphadenopathy and right hepatic lobe hypodensity probably malignancy  -  CT Chest No Cont: multiple enlarged right paratracheal lymph nodes measuring up to 2.7 x 2.6 cm. Subcarinal lymph node measures 3.0 x 2.3 cm. Questionable 1.7 cm right hepatic lobe hypodensity. Age-indeterminate T12 compression deformity, new from 2017. Large right pleura effusion with compressive atelectasis.   - S/p thoracentesis on  with exudative fluid, cytology pending  - evaluated by pulm given hypotension and desaturation, supplemental O2 maintain oxygen >92, not an ICU candidate  - will continue ceftriaxone pending culture results    #) UTI  - positive UA  - urine culture> [id number] GNR  - on ceftriaxone    # DANIEL on top of CKD stage 3 + hyperkalemia  -likely due to ischemic ATN (relative hypotension with baseline hypertension)  -Cr 1.3 10/2018 baseline  -oliguric  -slight improvement since admission  - will continue IV hydration. Will change IV to 50 cc NSS per nephrology recommendations  - Family refusing dialysis    #)HAGMA, elevated lactate,   - might be due to DANIEL, or lactic acidosis  - repeat lactate is still elevated  - renal US (refused by family)  - 6/15: CO2: 22, A    #) Hyperkalemia, resolved  - Due to DANIEL  - S/p dextrose/insulin  - s/p rectal kayexalate  - monitor K    #) Acute fracture of right 6th rib s/p fall  -pain control  -incentive spirometry    #)TIA  -will resume plavix     #) H/o Hypertension- pt with low BP  - hold norvac, labetalol, losatrtan    #)Diet: failed swallow evaluation. Will keep NPO. Family refusing NG feeding or PEG  or comfort feeds  #)DVT ppx: Hep SC  #)GI ppx: Not indicated  #)Dispo; From home as per patient  #)Code; DNR, DNI, no PEG tube/NGT, no dialysis. Ms. Prado is a 82 female poor historian with pmhx of HTN, TIA, h/o AAA repair, polymyalgia rheumatica presenting with rib pain after fall 2-3 weeks ago    # Exudative Right pleural effusion with mediastinal lymphadenopathy and right hepatic lobe hypodensity probably malignancy  -  CT Chest No Cont: multiple enlarged right paratracheal lymph nodes measuring up to 2.7 x 2.6 cm. Subcarinal lymph node measures 3.0 x 2.3 cm. Questionable 1.7 cm right hepatic lobe hypodensity. Age-indeterminate T12 compression deformity, new from 2017. Large right pleura effusion with compressive atelectasis.   - S/p thoracentesis on  with exudative fluid, cytology pending  - evaluated by pulm given hypotension and desaturation, supplemental O2 maintain oxygen >92, not an ICU candidate  - will continue ceftriaxone pending culture results    #) UTI  - positive UA  - urine culture> [id number] GNR  - on ceftriaxone    # DANIEL on top of CKD stage 3 + hyperkalemia  -likely due to ischemic ATN (relative hypotension with baseline hypertension)  -Cr 1.3 10/2018 baseline  -oliguric  -slight improvement since admission  - will continue IV hydration. Will change IV to 50 cc NSS per nephrology recommendations  - Family refusing dialysis    #)HAGMA, elevated lactate,   - might be due to DANIEL, or lactic acidosis  - repeat lactate is still elevated  - renal US (refused by family)  - 6/15: CO2: 22, A    #) Hyperkalemia, resolved  - Due to DANIEL  - S/p dextrose/insulin  - s/p rectal kayexalate  - monitor K    #) Hypercalcemia  - Likely associated with malignancy.  - On IV hydration,     #) Acute fracture of right 6th rib s/p fall  -pain control  -incentive spirometry    #)TIA  -will resume plavix     #) H/o Hypertension- pt with low BP  - hold norvac, labetalol, losatrtan    #)Diet: failed swallow evaluation. Will keep NPO. Family refusing NG feeding or PEG  or comfort feeds  #)DVT ppx: Hep SC  #)GI ppx: Not indicated  #)Dispo; From home as per patient  #)Code; DNR, DNI, no PEG tube/NGT, no dialysis.

## 2019-06-15 NOTE — PROGRESS NOTE ADULT - ASSESSMENT
81yo F with Past Medical History HTN, TIA, h/o AAA repair, and polymyalgia rheumatica admitted for rib pain status post mechanical fall. Her hospital course was complicated by right pleural effusion status post thoracentesis, acute kidney failure, and metabolic acidosis.    Right pleural effusion with mediastinal lymphadenopathy and right hepatic lobe hypodensity (suspected malignancy): CT revealed significant lympadenopathy with T12 compression fracture.  Pleural fluid cultures were consistent with exudate.  Cultures have bee negative to date.  Pathology pending.  Continue supplemental O2 via NC.  Family requests conservative management.  Acute renal failure/acute cystitis: urine cultures were positive for GNR.  Sensitivities are pending.  Continue treatment with IV Rocephin.  Serum creatinine has slightly recovered with IV hydration (creatinine decreased from 3 -> 2.8).  Continue to hold ARB.  Repeat BMP in AM  Hx of TIA: continue Plavix  Hypertension: off blood pressure medications due to hypotension.  Rib Fracture: continue incentive spirometry  GI prophylaxis  DNR/DNI/poor overall prognosis    #Progress Note Handoff    Pending:  Test Results: Urine Cx and Fluid pathology    Family Discussion: Family meeting for 6/17/19.  Grandson provided with updates at bedside

## 2019-06-15 NOTE — PROGRESS NOTE ADULT - ASSESSMENT
DANIEL  -due to ischemic ATN  -oliguric  -slight improvement since admission  CKD 3  -Cr 1.3 10/2018 baseline  hypotension with baseline HTN  large right pleural effusion  metabolic acidosis-resolved  hyperkalemia-resolved  hypercalcemia-stable  UTI  r/o PNA  likely metastatic lung cancer  AAA repair   PMR    plan:    dr merino spoke with daughter in depth, she is requesting no dialysis and prefers a palliative care focus with hospice eval  change ivf to ns @ 50 cc/hr  off labetalol, losartan, amlodipine, gabapentin  no dialysis  cont abx  DNR / DNI  palliative care eval  hospice eval  prognosis poor

## 2019-06-15 NOTE — PROGRESS NOTE ADULT - SUBJECTIVE AND OBJECTIVE BOX
[place] NEPHROLOGY FOLLOW UP NOTE  --------------------------------------------------------------------------------  24 hour events/subjective: Patient examined. Appears comfortable.    PAST HISTORY  --------------------------------------------------------------------------------  No significant changes to PMH, PSH, FHx, SHx, unless otherwise noted    ALLERGIES & MEDICATIONS  --------------------------------------------------------------------------------  Allergies    Fosamax (Other (Moderate))  ibuprofen (Flushing)  Novacet (Rash)  penicillin (Rash)  Sulfac 10% (Rash)  Tylenol (Pruritus)    Standing Inpatient Medications  cefTRIAXone   IVPB 1 Gram(s) IV Intermittent every 24 hours  chlorhexidine 4% Liquid 1 Application(s) Topical <User Schedule>  clopidogrel Tablet 75 milliGRAM(s) Oral daily  dextrose 5% + sodium chloride 0.45% 1000 milliLiter(s) IV Continuous <Continuous>  docusate sodium 100 milliGRAM(s) Oral two times a day  heparin  Injectable 5000 Unit(s) SubCutaneous every 8 hours  mirtazapine 7.5 milliGRAM(s) Oral at bedtime  senna 2 Tablet(s) Oral at bedtime    PRN Inpatient Medications  morphine  - Injectable 2 milliGRAM(s) IV Push every 4 hours PRN    VITALS/PHYSICAL EXAM  --------------------------------------------------------------------------------  T(C): 35.8 (06-15-19 @ 07:37), Max: 37.3 (06-14-19 @ 23:59)  HR: 82 (06-15-19 @ 07:37) (75 - 90)  BP: 107/53 (06-15-19 @ 07:37) (92/55 - 107/68)  RR: 18 (06-15-19 @ 07:37) (16 - 20)    06-14-19 @ 07:01  -  06-15-19 @ 07:00  --------------------------------------------------------  IN: 1800 mL / OUT: 300 mL / NET: 1500 mL    Physical Exam:  	Gen: NAD  	Pulm: CTA B/L  	CV: RRR, S1S2  	Abd: +BS, soft, nontender/nondistended  	: No suprapubic tenderness  	LE: Warm,  no edema    LABS/STUDIES  --------------------------------------------------------------------------------              10.6   7.35  >-----------<  152      [06-15-19 @ 11:19]              32.3     140  |  105  |  76  ----------------------------<  94      [06-15-19 @ 11:19]  4.6   |  22  |  2.5        Ca     10.9     [06-15-19 @ 11:19]      Mg     1.8     [06-15-19 @ 11:19]      Phos  4.5     [06-14-19 @ 16:45]    TPro  4.8  /  Alb  2.6  /  TBili  0.4  /  DBili  x   /  AST  47  /  ALT  28  /  AlkPhos  131  [06-15-19 @ 11:19]    PT/INR: PT 12.80, INR 1.11       [06-14-19 @ 06:08]    Creatinine Trend:  SCr 2.5 [06-15 @ 11:19]  SCr 2.8 [06-15 @ 01:14]  SCr 3.0 [06-14 @ 16:45]  SCr 3.3 [06-14 @ 06:08]  SCr 3.1 [06-13 @ 12:02]    Urinalysis - [06-13-19 @ 00:24]      Color Yellow / Appearance Turbid / SG 1.020 / pH 5.5      Gluc Negative / Ketone Trace  / Bili Small / Urobili 0.2       Blood Trace / Protein Trace / Leuk Est Large / Nitrite Negative      RBC 3-5 / WBC >50 / Hyaline  / Gran  / Sq Epi  / Non Sq Epi  / Bacteria Many

## 2019-06-15 NOTE — PROGRESS NOTE ADULT - SUBJECTIVE AND OBJECTIVE BOX
SUBJECTIVE:    Patient is a 83y old Female who presents with a chief complaint of weakness/rib fx (2019 14:41)    Currently admitted to medicine with the primary diagnosis of Rib fracture     Today is hospital day 2d.  This morning she is resting in bed, arousable.   On nasal canula.  Normotensive  Afebrile.  No complaint    PAST MEDICAL & SURGICAL HISTORY  Hip fracture  Smoker  Polymyalgia rheumatica  Abdominal aortic aneurysm (AAA) without rupture  Essential hypertension  Falls  TIA (transient ischemic attack)  H/O abdominal aortic aneurysm repair    SOCIAL HISTORY:  Negative for smoking/alcohol/drug use.     ALLERGIES:  Fosamax (Other (Moderate))  ibuprofen (Flushing)  Novacet (Rash)  penicillin (Rash)  Sulfac 10% (Rash)  Tylenol (Pruritus)    MEDICATIONS:  STANDING MEDICATIONS  cefTRIAXone   IVPB 1 Gram(s) IV Intermittent every 24 hours  chlorhexidine 4% Liquid 1 Application(s) Topical <User Schedule>  clopidogrel Tablet 75 milliGRAM(s) Oral daily  dextrose 5% + sodium chloride 0.45% 1000 milliLiter(s) IV Continuous <Continuous>  docusate sodium 100 milliGRAM(s) Oral two times a day  heparin  Injectable 5000 Unit(s) SubCutaneous every 8 hours  mirtazapine 7.5 milliGRAM(s) Oral at bedtime  senna 2 Tablet(s) Oral at bedtime    PRN MEDICATIONS  morphine  - Injectable 1 milliGRAM(s) IV Push every 6 hours PRN  oxyCODONE    IR 10 milliGRAM(s) Oral three times a day PRN    VITALS:   T(F): 97  HR: 75  BP: 92/55  RR: 20  SpO2: 92%    LABS:                        12.0   8.67  )-----------( 157      ( 2019 06:08 )             39.0     06-14    137  |  105  |  84<HH>  ----------------------------<  76  6.0<HH>   |  15<L>  |  3.3<H>    Ca    10.7<H>      2019 06:08  Mg     2.6     06-12    TPro  5.6<L>  /  Alb  3.2<L>  /  TBili  0.4  /  DBili  0.3<H>  /  AST  54<H>  /  ALT  38  /  AlkPhos  179<H>  14    PT/INR - ( 2019 06:08 )   PT: 12.80 sec;   INR: 1.11 ratio           Urinalysis Basic - ( 2019 00:24 )    Color: Yellow / Appearance: Turbid / S.020 / pH: x  Gluc: x / Ketone: Trace  / Bili: Small / Urobili: 0.2 mg/dL   Blood: x / Protein: Trace mg/dL / Nitrite: Negative   Leuk Esterase: Large / RBC: 3-5 /HPF / WBC >50 /HPF   Sq Epi: x / Non Sq Epi: x / Bacteria: Many /HPF            Culture - Fungal, Body Fluid (collected 2019 14:25)  Source: .Body Fluid Pleural Fluid  Preliminary Report (2019 13:58):    Testing in progress    Culture - Body Fluid with Gram Stain (collected 2019 14:25)  Source: .Body Fluid Pleural Fluid  Gram Stain (2019 06:12):    polymorphonuclear leukocytes seen    No organisms seen    by cytocentrifuge    Culture - Urine (collected 2019 00:24)  Source: .Urine Catheterized  Preliminary Report (2019 15:08):    >100,000 CFU/ml Gram Negative Rods      CARDIAC MARKERS ( 2019 12:02 )  x     / 0.01 ng/mL / 168 U/L / x     / 2.5 ng/mL  CARDIAC MARKERS ( 2019 20:35 )  x     / 0.03 ng/mL / 296 U/L / x     / x          RADIOLOGY:      CT Abdomen and Pelvis No Cont (19 @ 22:35)    Acute fracture of right rib #6. No pneumothorax.   Large right pleura effusion with compressive atelectasis.   No CT evidence of acute intra-abdominal pathology.   There is multi station mediastinal lymphadenopathy, for example there are   multiple enlarged right paratracheal lymph nodes measuring up to 2.7 x   2.6 cm (series 7, image 111). Subcarinal lymph node measures 3.0 x 2.3 cm.  There is also a trace left pleural effusion.  Centrilobular emphysema.  Questionable 1.7 cm right hepatic lobe hypodensity limited evaluation on   noncontrast imaging (series 7, image 343).  Cholelithiasis.  Age-indeterminate T12 compression deformity, new from 2017.   CT Cervical Spine No Cont (19 @ 22:34)    IMPRESSION:   No acute cervical spine fracture or subluxation.   Partially visualized large right pleural fusion, better characterized on   CT chest, abdomen and pelvis from same day.   CT Head No Cont (19 @ 22:24)     Impression:     No CT evidence for acute intracranial pathology.    Chronic microvascular ischemic changes.  	    PHYSICAL EXAM:    GENERAL: in distress, arousable, on nasal canula  	HEAD:  Atraumatic, Normocephalic  	CHEST/LUNG: b/l rhonchi, decrease air entry over left lower lung field  	HEART: Regular rate and rhythm; No murmurs;   	ABDOMEN: Soft, Nontender, Nondistended; No guarding  	EXTREMITIES: No edema    Neuro: AOx1 knows name, unable to assess as patient is not following commands

## 2019-06-15 NOTE — PROGRESS NOTE ADULT - SUBJECTIVE AND OBJECTIVE BOX
MAURICIO KONG [id number]    Hospitalist Note  81yo F with Past Medical History HTN, TIA, h/o AAA repair, and polymyalgia rheumatica admitted for rib pain status post mechanical fall. Her hospital course was complicated by acute kidney failure and metabolic acidosis.    Overnight events/Updates: No acute events.  Family meeting scheduled for 6/17/19.    Vital Signs Last 24 Hrs  T(C): 35.8 (15 Derik 2019 07:37), Max: 37.3 (14 Jun 2019 23:59)  T(F): 96.5 (15 Derik 2019 07:37), Max: 99.1 (14 Jun 2019 23:59)  HR: 82 (15 Derik 2019 07:37) (75 - 90)  BP: 107/53 (15 Derik 2019 07:37) (92/55 - 107/68)  BP(mean): 77 (15 Derik 2019 07:37) (71 - 80)  RR: 18 (15 Derik 2019 07:37) (16 - 20)  SpO2: --    Physical Examination:  General: AAO x 1  HEENT: PERRLA, EOMI, NC  CV= S1 & S2 appreciated  Lungs= CTA BL  Abdominal Examination= + BS, Soft, NT/ND  Extremity Examination= peripheral edema to the UE    ROS: No chest pain, no shortness of breath.  All other systems reviewed and are within normal limits except for the complaints in the HPI.    MEDICATIONS  (STANDING):  cefTRIAXone   IVPB 1 Gram(s) IV Intermittent every 24 hours  chlorhexidine 4% Liquid 1 Application(s) Topical <User Schedule>  clopidogrel Tablet 75 milliGRAM(s) Oral daily  dextrose 5% + sodium chloride 0.45% 1000 milliLiter(s) (75 mL/Hr) IV Continuous <Continuous>  docusate sodium 100 milliGRAM(s) Oral two times a day  heparin  Injectable 5000 Unit(s) SubCutaneous every 8 hours  mirtazapine 7.5 milliGRAM(s) Oral at bedtime  senna 2 Tablet(s) Oral at bedtime    MEDICATIONS  (PRN):  morphine  - Injectable 2 milliGRAM(s) IV Push every 4 hours PRN Severe Pain (7 - 10)                            12.0   8.67  )-----------( 157      ( 14 Jun 2019 06:08 )             39.0     06-15    138  |  105  |  78<HH>  ----------------------------<  92  4.8   |  18  |  2.8<H>    Ca    10.9<H>      15 Derik 2019 01:14  Phos  4.5     06-14    TPro  5.6<L>  /  Alb  3.2<L>  /  TBili  0.4  /  DBili  0.3<H>  /  AST  54<H>  /  ALT  38  /  AlkPhos  179<H>  06-14      Case discussed with housestaff & family  ISIS Morfin [id number]

## 2019-06-15 NOTE — PROGRESS NOTE ADULT - ASSESSMENT
Impression:    Right pleural effusion: Mediastinal Lymph nodes with possible liver mets. Most likely malignant effusion. Heavy smoker +weight loss  Acute renal failure   Failure to thrive.     PLAN:    CNS: NO sedation    HEENT: Oral care    PULMONARY:  HOB @ 45 degrees. f/up cyto    GI: GI prophylaxis.  Feeding as tolerated. Calorie count. Encourage feed. Nutrition evaluation.     RENAL:  Follow up lytes.  Correct as needed. Continue with monitor urine output. Follow with Renal.     INFECTIOUS DISEASE: Follow up cultures. No indication for Abx at this time.     HEMATOLOGICAL:  DVT prophylaxis.    ENDOCRINE:  Follow up FS.  Insulin protocol if needed.    MUSCULOSKELETAL: Out of bed to chair    DNR/DNI  Palliative care eval.   spoke with grandson at bedside

## 2019-06-15 NOTE — PROGRESS NOTE ADULT - SUBJECTIVE AND OBJECTIVE BOX
OVERNIGHT EVENTS: events noted, cx neg, cyto p    Vital Signs Last 24 Hrs  T(C): 35.8 (15 Derik 2019 07:37), Max: 37.3 (14 Jun 2019 23:59)  T(F): 96.5 (15 Derik 2019 07:37), Max: 99.1 (14 Jun 2019 23:59)  HR: 82 (15 Derik 2019 07:37) (75 - 90)  BP: 107/53 (15 Derik 2019 07:37) (92/55 - 107/68)  BP(mean): 77 (15 Derik 2019 07:37) (71 - 80)  RR: 18 (15 Derik 2019 07:37) (16 - 20)  SpO2: 93%    PHYSICAL EXAMINATION:    GENERAL: tired ill looking    HEENT: Head is normocephalic and atraumatic. Extraocular muscles are intact. Mucous membranes are moist.    NECK: Supple.    LUNGS: dec bs r side    HEART: Regular rate and rhythm without murmur.    ABDOMEN: Soft, nontender, and nondistended.      EXTREMITIES: Without any cyanosis, clubbing, rash, lesions or edema.    NEUROLOGIC: Grossly intact.    SKIN: No ulceration or induration present.      LABS:                        12.0   8.67  )-----------( 157      ( 14 Jun 2019 06:08 )             39.0     06-15    138  |  105  |  78<HH>  ----------------------------<  92  4.8   |  18  |  2.8<H>    Ca    10.9<H>      15 Derik 2019 01:14  Phos  4.5     06-14    TPro  5.6<L>  /  Alb  3.2<L>  /  TBili  0.4  /  DBili  0.3<H>  /  AST  54<H>  /  ALT  38  /  AlkPhos  179<H>  06-14    PT/INR - ( 14 Jun 2019 06:08 )   PT: 12.80 sec;   INR: 1.11 ratio               CARDIAC MARKERS ( 13 Jun 2019 12:02 )  x     / 0.01 ng/mL / 168 U/L / x     / 2.5 ng/mL        Serum Pro-Brain Natriuretic Peptide: 6169 pg/mL (06-12-19 @ 20:35)            06-14-19 @ 07:01  -  06-15-19 @ 07:00  --------------------------------------------------------  IN: 1800 mL / OUT: 300 mL / NET: 1500 mL        MICROBIOLOGY:  Culture Results:   No growth (06-13 @ 14:25)  Culture Results:   Testing in progress (06-13 @ 14:25)  Culture Results:   No growth to date. (06-13 @ 12:02)  Culture Results:   >100,000 CFU/ml Gram Negative Rods (06-13 @ 00:24)      MEDICATIONS  (STANDING):  cefTRIAXone   IVPB 1 Gram(s) IV Intermittent every 24 hours  chlorhexidine 4% Liquid 1 Application(s) Topical <User Schedule>  clopidogrel Tablet 75 milliGRAM(s) Oral daily  dextrose 5% + sodium chloride 0.45% 1000 milliLiter(s) (75 mL/Hr) IV Continuous <Continuous>  docusate sodium 100 milliGRAM(s) Oral two times a day  heparin  Injectable 5000 Unit(s) SubCutaneous every 8 hours  mirtazapine 7.5 milliGRAM(s) Oral at bedtime  senna 2 Tablet(s) Oral at bedtime    MEDICATIONS  (PRN):  morphine  - Injectable 2 milliGRAM(s) IV Push every 4 hours PRN Severe Pain (7 - 10)      RADIOLOGY & ADDITIONAL STUDIES:

## 2019-06-16 LAB
ALBUMIN SERPL ELPH-MCNC: 2.5 G/DL — LOW (ref 3.5–5.2)
ALP SERPL-CCNC: 130 U/L — HIGH (ref 30–115)
ALT FLD-CCNC: 28 U/L — SIGNIFICANT CHANGE UP (ref 0–41)
ANION GAP SERPL CALC-SCNC: 17 MMOL/L — HIGH (ref 7–14)
AST SERPL-CCNC: 49 U/L — HIGH (ref 0–41)
BILIRUB SERPL-MCNC: 0.5 MG/DL — SIGNIFICANT CHANGE UP (ref 0.2–1.2)
BUN SERPL-MCNC: 74 MG/DL — CRITICAL HIGH (ref 10–20)
CALCIUM SERPL-MCNC: 11.2 MG/DL — HIGH (ref 8.5–10.1)
CHLORIDE SERPL-SCNC: 105 MMOL/L — SIGNIFICANT CHANGE UP (ref 98–110)
CO2 SERPL-SCNC: 19 MMOL/L — SIGNIFICANT CHANGE UP (ref 17–32)
CREAT SERPL-MCNC: 2.2 MG/DL — HIGH (ref 0.7–1.5)
GLUCOSE SERPL-MCNC: 64 MG/DL — LOW (ref 70–99)
HCT VFR BLD CALC: 34.5 % — LOW (ref 37–47)
HGB BLD-MCNC: 10.8 G/DL — LOW (ref 12–16)
LACTATE SERPL-SCNC: 2.2 MMOL/L — SIGNIFICANT CHANGE UP (ref 0.5–2.2)
MAGNESIUM SERPL-MCNC: 1.8 MG/DL — SIGNIFICANT CHANGE UP (ref 1.8–2.4)
MCHC RBC-ENTMCNC: 29.4 PG — SIGNIFICANT CHANGE UP (ref 27–31)
MCHC RBC-ENTMCNC: 31.3 G/DL — LOW (ref 32–37)
MCV RBC AUTO: 94 FL — SIGNIFICANT CHANGE UP (ref 81–99)
NRBC # BLD: 0 /100 WBCS — SIGNIFICANT CHANGE UP (ref 0–0)
PLATELET # BLD AUTO: 158 K/UL — SIGNIFICANT CHANGE UP (ref 130–400)
POTASSIUM SERPL-MCNC: 4.6 MMOL/L — SIGNIFICANT CHANGE UP (ref 3.5–5)
POTASSIUM SERPL-SCNC: 4.6 MMOL/L — SIGNIFICANT CHANGE UP (ref 3.5–5)
PROT SERPL-MCNC: 4.6 G/DL — LOW (ref 6–8)
RBC # BLD: 3.67 M/UL — LOW (ref 4.2–5.4)
RBC # FLD: 15.7 % — HIGH (ref 11.5–14.5)
SODIUM SERPL-SCNC: 141 MMOL/L — SIGNIFICANT CHANGE UP (ref 135–146)
WBC # BLD: 9.03 K/UL — SIGNIFICANT CHANGE UP (ref 4.8–10.8)
WBC # FLD AUTO: 9.03 K/UL — SIGNIFICANT CHANGE UP (ref 4.8–10.8)

## 2019-06-16 PROCEDURE — 93306 TTE W/DOPPLER COMPLETE: CPT | Mod: 26

## 2019-06-16 PROCEDURE — 99233 SBSQ HOSP IP/OBS HIGH 50: CPT

## 2019-06-16 RX ADMIN — MORPHINE SULFATE 2 MILLIGRAM(S): 50 CAPSULE, EXTENDED RELEASE ORAL at 09:28

## 2019-06-16 RX ADMIN — CEFTRIAXONE 100 GRAM(S): 500 INJECTION, POWDER, FOR SOLUTION INTRAMUSCULAR; INTRAVENOUS at 09:27

## 2019-06-16 RX ADMIN — MORPHINE SULFATE 2 MILLIGRAM(S): 50 CAPSULE, EXTENDED RELEASE ORAL at 11:00

## 2019-06-16 RX ADMIN — HEPARIN SODIUM 5000 UNIT(S): 5000 INJECTION INTRAVENOUS; SUBCUTANEOUS at 14:09

## 2019-06-16 RX ADMIN — HEPARIN SODIUM 5000 UNIT(S): 5000 INJECTION INTRAVENOUS; SUBCUTANEOUS at 05:21

## 2019-06-16 RX ADMIN — CHLORHEXIDINE GLUCONATE 1 APPLICATION(S): 213 SOLUTION TOPICAL at 05:21

## 2019-06-16 RX ADMIN — HEPARIN SODIUM 5000 UNIT(S): 5000 INJECTION INTRAVENOUS; SUBCUTANEOUS at 21:50

## 2019-06-16 NOTE — PROGRESS NOTE ADULT - SUBJECTIVE AND OBJECTIVE BOX
OVERNIGHT EVENTS: events noted, tachypneic more awake    Vital Signs Last 24 Hrs  T(C): 36.5 (16 Jun 2019 07:43), Max: 36.7 (16 Jun 2019 00:00)  T(F): 97.7 (16 Jun 2019 07:43), Max: 98 (16 Jun 2019 00:00)  HR: 103 (16 Jun 2019 07:43) (88 - 103)  BP: 161/73 (16 Jun 2019 07:43) (123/65 - 161/73)  BP(mean): 105 (16 Jun 2019 07:43) (85 - 105)  RR: 20 (16 Jun 2019 07:43) (17 - 20)  SpO2: 94%    PHYSICAL EXAMINATION:    GENERAL: The patient is awake and alert in no apparent distress.     HEENT: Head is normocephalic and atraumatic. Extraocular muscles are intact. Mucous membranes are moist.    NECK: Supple.    LUNGS: dec bs r side    HEART: Regular rate and rhythm without murmur.    ABDOMEN: Soft, nontender, and nondistended.      EXTREMITIES: swelling +    NEUROLOGIC: Grossly intact.    SKIN: No ulceration or induration present.      LABS:                        10.8   9.03  )-----------( 158      ( 16 Jun 2019 05:13 )             34.5     06-16    141  |  105  |  74<HH>  ----------------------------<  64<L>  4.6   |  19  |  2.2<H>    Ca    11.2<H>      16 Jun 2019 05:13  Phos  4.5     06-14  Mg     1.8     06-16    TPro  4.6<L>  /  Alb  2.5<L>  /  TBili  0.5  /  DBili  x   /  AST  49<H>  /  ALT  28  /  AlkPhos  130<H>  06-16                  Lactate, Blood: 2.2 mmol/L (06-16-19 @ 05:13)  Lactate, Blood: 2.3 mmol/L (06-15-19 @ 11:19)          06-15-19 @ 07:01  -  06-16-19 @ 07:00  --------------------------------------------------------  IN: 550 mL / OUT: 559 mL / NET: -9 mL        MICROBIOLOGY:  Culture Results:   No growth to date. (06-14 @ 06:08)  Culture Results:   No growth (06-13 @ 14:25)  Culture Results:   Testing in progress (06-13 @ 14:25)  Culture Results:   No growth to date. (06-13 @ 12:02)  Culture Results:   >100,000 CFU/ml Escherichia coli (06-13 @ 00:24)      MEDICATIONS  (STANDING):  cefTRIAXone   IVPB 1 Gram(s) IV Intermittent every 24 hours  chlorhexidine 4% Liquid 1 Application(s) Topical <User Schedule>  clopidogrel Tablet 75 milliGRAM(s) Oral daily  docusate sodium 100 milliGRAM(s) Oral two times a day  heparin  Injectable 5000 Unit(s) SubCutaneous every 8 hours  mirtazapine 7.5 milliGRAM(s) Oral at bedtime  senna 2 Tablet(s) Oral at bedtime  sodium chloride 0.9%. 1000 milliLiter(s) (50 mL/Hr) IV Continuous <Continuous>    MEDICATIONS  (PRN):  morphine  - Injectable 2 milliGRAM(s) IV Push every 4 hours PRN Severe Pain (7 - 10)      RADIOLOGY & ADDITIONAL STUDIES:

## 2019-06-16 NOTE — PROGRESS NOTE ADULT - ASSESSMENT
Impression:    Right pleural effusion: Mediastinal Lymph nodes with possible liver mets. Most likely malignant effusion. Heavy smoker +weight loss  Acute renal failure   Failure to thrive.     PLAN:    - f/up cyto  - LE doppler  - will follow  - poor prognosis

## 2019-06-16 NOTE — PROGRESS NOTE ADULT - ASSESSMENT
83yo F with Past Medical History HTN, TIA, h/o AAA repair, and polymyalgia rheumatica admitted for rib pain status post mechanical fall. Her hospital course was complicated by right pleural effusion status post thoracentesis, acute kidney failure, and metabolic acidosis.    Right pleural effusion with mediastinal lymphadenopathy and right hepatic lobe hypodensity (suspected malignancy): CT revealed significant lympadenopathy with T12 compression fracture.  Pleural fluid cultures were consistent with exudate.  No pathology available.  Continue supplemental O2 via NC.  Family requests conservative management.  Acute renal failure/acute cystitis: urine cultures were positive for pan-sensitive E.coli.  Continue treatment with IV Rocephin.  Renal function has recovered, decreasing to 2.2. Continue to hold ARB.  Trend BMP    Hx of TIA: continue Plavix  Hypertension: off blood pressure medications due to hypotension.  Rib Fracture: continue incentive spirometry  GI prophylaxis  DNR/DNI/poor overall prognosis    #Progress Note Handoff    Pending:  Test Results: Fluid pathology    Family Discussion: Family meeting for 6/17/19.  Grandson provided with updates at bedside

## 2019-06-16 NOTE — PROGRESS NOTE ADULT - SUBJECTIVE AND OBJECTIVE BOX
[facility] FOLLOW UP NOTE  --------------------------------------------------------------------------------  24 hour events/subjective: Patient examined. Appears comfortable.    PAST HISTORY  --------------------------------------------------------------------------------  No significant changes to PMH, PSH, FHx, SHx, unless otherwise noted    ALLERGIES & MEDICATIONS  --------------------------------------------------------------------------------  Allergies    Fosamax (Other (Moderate))  ibuprofen (Flushing)  Novacet (Rash)  penicillin (Rash)  Sulfac 10% (Rash)  Tylenol (Pruritus)    Standing Inpatient Medications  cefTRIAXone   IVPB 1 Gram(s) IV Intermittent every 24 hours  chlorhexidine 4% Liquid 1 Application(s) Topical <User Schedule>  clopidogrel Tablet 75 milliGRAM(s) Oral daily  docusate sodium 100 milliGRAM(s) Oral two times a day  heparin  Injectable 5000 Unit(s) SubCutaneous every 8 hours  mirtazapine 7.5 milliGRAM(s) Oral at bedtime  senna 2 Tablet(s) Oral at bedtime  sodium chloride 0.9%. 1000 milliLiter(s) IV Continuous <Continuous>    PRN Inpatient Medications  morphine  - Injectable 2 milliGRAM(s) IV Push every 4 hours PRN    VITALS/PHYSICAL EXAM  --------------------------------------------------------------------------------  T(C): 36.5 (06-16-19 @ 07:43), Max: 36.7 (06-16-19 @ 00:00)  HR: 103 (06-16-19 @ 07:43) (88 - 103)  BP: 161/73 (06-16-19 @ 07:43) (123/65 - 161/73)  RR: 20 (06-16-19 @ 07:43) (17 - 20)    06-15-19 @ 07:01  -  06-16-19 @ 07:00  --------------------------------------------------------  IN: 550 mL / OUT: 559 mL / NET: -9 mL    Physical Exam:  	Gen: NAD  	Pulm: CTA B/L  	CV: RRR, S1S2  	Abd: +BS, soft, nontender/nondistended  	: No suprapubic tenderness  	LE: Warm,  edema    LABS/STUDIES  --------------------------------------------------------------------------------              10.8   9.03  >-----------<  158      [06-16-19 @ 05:13]              34.5     141  |  105  |  74  ----------------------------<  64      [06-16-19 @ 05:13]  4.6   |  19  |  2.2        Ca     11.2     [06-16-19 @ 05:13]      Mg     1.8     [06-16-19 @ 05:13]      Phos  4.5     [06-14-19 @ 16:45]    TPro  4.6  /  Alb  2.5  /  TBili  0.5  /  DBili  x   /  AST  49  /  ALT  28  /  AlkPhos  130  [06-16-19 @ 05:13]    Creatinine Trend:  SCr 2.2 [06-16 @ 05:13]  SCr 2.5 [06-15 @ 11:19]  SCr 2.8 [06-15 @ 01:14]  SCr 3.0 [06-14 @ 16:45]  SCr 3.3 [06-14 @ 06:08]    Urinalysis - [06-13-19 @ 00:24]      Color Yellow / Appearance Turbid / SG 1.020 / pH 5.5      Gluc Negative / Ketone Trace  / Bili Small / Urobili 0.2       Blood Trace / Protein Trace / Leuk Est Large / Nitrite Negative      RBC 3-5 / WBC >50 / Hyaline  / Gran  / Sq Epi  / Non Sq Epi  / Bacteria Many

## 2019-06-16 NOTE — PROGRESS NOTE ADULT - SUBJECTIVE AND OBJECTIVE BOX
MAURICIO KONG [id number]    Hospitalist Note  83yo F with Past Medical History HTN, TIA, h/o AAA repair, and polymyalgia rheumatica admitted for rib pain status post mechanical fall. Her hospital course was complicated by acute kidney failure and metabolic acidosis.    Overnight events/Updates: The patient is more alert and interactive this AM.  Family meeting scheduled for tomorrow (6/17/19)    Vital Signs Last 24 Hrs  T(C): 36.5 (16 Jun 2019 07:43), Max: 36.7 (16 Jun 2019 00:00)  T(F): 97.7 (16 Jun 2019 07:43), Max: 98 (16 Jun 2019 00:00)  HR: 103 (16 Jun 2019 07:43) (88 - 103)  BP: 161/73 (16 Jun 2019 07:43) (123/65 - 161/73)  BP(mean): 105 (16 Jun 2019 07:43) (85 - 105)  RR: 20 (16 Jun 2019 07:43) (17 - 20)  SpO2: --    Physical Examination:  General: AAO x 1  HEENT: PERRLA, EOMI  CV= S1 & S2 appreciated  Lungs=CTA BL  Abdominal Examination= + BS, Soft, NT/ND  Extremity Examination= peripheral edema    ROS: No chest pain, no shortness of breath.  All other systems reviewed and are within normal limits except for the complaints in the HPI.    MEDICATIONS  (STANDING):  cefTRIAXone   IVPB 1 Gram(s) IV Intermittent every 24 hours  chlorhexidine 4% Liquid 1 Application(s) Topical <User Schedule>  clopidogrel Tablet 75 milliGRAM(s) Oral daily  docusate sodium 100 milliGRAM(s) Oral two times a day  heparin  Injectable 5000 Unit(s) SubCutaneous every 8 hours  mirtazapine 7.5 milliGRAM(s) Oral at bedtime  senna 2 Tablet(s) Oral at bedtime  sodium chloride 0.9%. 1000 milliLiter(s) (50 mL/Hr) IV Continuous <Continuous>    MEDICATIONS  (PRN):  morphine  - Injectable 2 milliGRAM(s) IV Push every 4 hours PRN Severe Pain (7 - 10)                            10.8   9.03  )-----------( 158      ( 16 Jun 2019 05:13 )             34.5     06-16    141  |  105  |  74<HH>  ----------------------------<  64<L>  4.6   |  19  |  2.2<H>    Ca    11.2<H>      16 Jun 2019 05:13  Phos  4.5     06-14  Mg     1.8     06-16    TPro  4.6<L>  /  Alb  2.5<L>  /  TBili  0.5  /  DBili  x   /  AST  49<H>  /  ALT  28  /  AlkPhos  130<H>  06-16      Case discussed with housesta & family  ISIS Morfin [id number]

## 2019-06-16 NOTE — PROGRESS NOTE ADULT - ASSESSMENT
DANIEL  -due to ischemic ATN  -oliguric  -slight improvement since admission  CKD 3  -Cr 1.3 10/2018 baseline  hypotension with baseline HTN  large right pleural effusion  metabolic acidosis-resolved  hyperkalemia-resolved  hypercalcemia-worsening  UTI  r/o PNA  likely metastatic lung cancer  AAA repair   PMR    plan:    dr merino spoke with daughter in depth, she is requesting no dialysis and prefers a palliative care focus with hospice eval  same ivf for now  check iPTH and PTHrp  off labetalol, losartan, amlodipine, gabapentin  no dialysis  cont abx  DNR / DNI  palliative care eval  hospice eval  prognosis poor

## 2019-06-17 VITALS
HEART RATE: 105 BPM | SYSTOLIC BLOOD PRESSURE: 100 MMHG | TEMPERATURE: 98 F | RESPIRATION RATE: 20 BRPM | DIASTOLIC BLOOD PRESSURE: 51 MMHG

## 2019-06-17 LAB
ALBUMIN SERPL ELPH-MCNC: 2.6 G/DL — LOW (ref 3.5–5.2)
ALP SERPL-CCNC: 122 U/L — HIGH (ref 30–115)
ALT FLD-CCNC: 28 U/L — SIGNIFICANT CHANGE UP (ref 0–41)
ANION GAP SERPL CALC-SCNC: 24 MMOL/L — HIGH (ref 7–14)
ANISOCYTOSIS BLD QL: SLIGHT — SIGNIFICANT CHANGE UP
AST SERPL-CCNC: 52 U/L — HIGH (ref 0–41)
BASOPHILS # BLD AUTO: 0 K/UL — SIGNIFICANT CHANGE UP (ref 0–0.2)
BASOPHILS NFR BLD AUTO: 0 % — SIGNIFICANT CHANGE UP (ref 0–1)
BILIRUB SERPL-MCNC: 0.6 MG/DL — SIGNIFICANT CHANGE UP (ref 0.2–1.2)
BUN SERPL-MCNC: 82 MG/DL — CRITICAL HIGH (ref 10–20)
BURR CELLS BLD QL SMEAR: PRESENT — SIGNIFICANT CHANGE UP
CALCIUM SERPL-MCNC: 11.9 MG/DL — HIGH (ref 8.4–10.5)
CALCIUM SERPL-MCNC: 12.1 MG/DL — HIGH (ref 8.5–10.1)
CHLORIDE SERPL-SCNC: 104 MMOL/L — SIGNIFICANT CHANGE UP (ref 98–110)
CO2 SERPL-SCNC: 14 MMOL/L — LOW (ref 17–32)
CREAT SERPL-MCNC: 2.3 MG/DL — HIGH (ref 0.7–1.5)
EOSINOPHIL # BLD AUTO: 0.09 K/UL — SIGNIFICANT CHANGE UP (ref 0–0.7)
EOSINOPHIL NFR BLD AUTO: 0.9 % — SIGNIFICANT CHANGE UP (ref 0–8)
GIANT PLATELETS BLD QL SMEAR: PRESENT — SIGNIFICANT CHANGE UP
GLUCOSE SERPL-MCNC: 59 MG/DL — LOW (ref 70–99)
HCT VFR BLD CALC: 35.8 % — LOW (ref 37–47)
HGB BLD-MCNC: 11 G/DL — LOW (ref 12–16)
LYMPHOCYTES # BLD AUTO: 0.58 K/UL — LOW (ref 1.2–3.4)
LYMPHOCYTES # BLD AUTO: 6.1 % — LOW (ref 20.5–51.1)
MACROCYTES BLD QL: SLIGHT — SIGNIFICANT CHANGE UP
MAGNESIUM SERPL-MCNC: 2 MG/DL — SIGNIFICANT CHANGE UP (ref 1.8–2.4)
MANUAL SMEAR VERIFICATION: SIGNIFICANT CHANGE UP
MCHC RBC-ENTMCNC: 29.5 PG — SIGNIFICANT CHANGE UP (ref 27–31)
MCHC RBC-ENTMCNC: 30.7 G/DL — LOW (ref 32–37)
MCV RBC AUTO: 96 FL — SIGNIFICANT CHANGE UP (ref 81–99)
METAMYELOCYTES # FLD: 0.9 % — HIGH (ref 0–0)
MONOCYTES # BLD AUTO: 0.65 K/UL — HIGH (ref 0.1–0.6)
MONOCYTES NFR BLD AUTO: 6.9 % — SIGNIFICANT CHANGE UP (ref 1.7–9.3)
MYELOCYTES NFR BLD: 0.9 % — HIGH (ref 0–0)
NEUTROPHILS # BLD AUTO: 7.98 K/UL — HIGH (ref 1.4–6.5)
NEUTROPHILS NFR BLD AUTO: 81.7 % — HIGH (ref 42.2–75.2)
NEUTS BAND # BLD: 2.6 % — SIGNIFICANT CHANGE UP (ref 0–6)
NRBC # BLD: 1 /100 — HIGH (ref 0–0)
NRBC # BLD: SIGNIFICANT CHANGE UP /100 WBCS (ref 0–0)
PLAT MORPH BLD: ABNORMAL
PLATELET # BLD AUTO: 176 K/UL — SIGNIFICANT CHANGE UP (ref 130–400)
POIKILOCYTOSIS BLD QL AUTO: SIGNIFICANT CHANGE UP
POLYCHROMASIA BLD QL SMEAR: SLIGHT — SIGNIFICANT CHANGE UP
POTASSIUM SERPL-MCNC: 4.8 MMOL/L — SIGNIFICANT CHANGE UP (ref 3.5–5)
POTASSIUM SERPL-SCNC: 4.8 MMOL/L — SIGNIFICANT CHANGE UP (ref 3.5–5)
PROT SERPL-MCNC: 4.6 G/DL — LOW (ref 6–8)
PTH-INTACT FLD-MCNC: 10 PG/ML — LOW (ref 15–65)
RBC # BLD: 3.73 M/UL — LOW (ref 4.2–5.4)
RBC # FLD: 16.2 % — HIGH (ref 11.5–14.5)
RBC BLD AUTO: ABNORMAL
SODIUM SERPL-SCNC: 142 MMOL/L — SIGNIFICANT CHANGE UP (ref 135–146)
WBC # BLD: 9.47 K/UL — SIGNIFICANT CHANGE UP (ref 4.8–10.8)
WBC # FLD AUTO: 9.47 K/UL — SIGNIFICANT CHANGE UP (ref 4.8–10.8)

## 2019-06-17 PROCEDURE — 99231 SBSQ HOSP IP/OBS SF/LOW 25: CPT

## 2019-06-17 PROCEDURE — 99233 SBSQ HOSP IP/OBS HIGH 50: CPT

## 2019-06-17 PROCEDURE — 99497 ADVNCD CARE PLAN 30 MIN: CPT | Mod: 25

## 2019-06-17 PROCEDURE — 71045 X-RAY EXAM CHEST 1 VIEW: CPT | Mod: 26

## 2019-06-17 RX ORDER — MORPHINE SULFATE 50 MG/1
10 CAPSULE, EXTENDED RELEASE ORAL
Refills: 0 | Status: DISCONTINUED | OUTPATIENT
Start: 2019-06-17 | End: 2019-06-18

## 2019-06-17 RX ORDER — SCOPALAMINE 1 MG/3D
1 PATCH, EXTENDED RELEASE TRANSDERMAL
Refills: 0 | Status: DISCONTINUED | OUTPATIENT
Start: 2019-06-17 | End: 2019-06-18

## 2019-06-17 RX ORDER — MORPHINE SULFATE 50 MG/1
10 CAPSULE, EXTENDED RELEASE ORAL EVERY 4 HOURS
Refills: 0 | Status: DISCONTINUED | OUTPATIENT
Start: 2019-06-17 | End: 2019-06-18

## 2019-06-17 RX ADMIN — MORPHINE SULFATE 2 MILLIGRAM(S): 50 CAPSULE, EXTENDED RELEASE ORAL at 01:54

## 2019-06-17 RX ADMIN — CEFTRIAXONE 100 GRAM(S): 500 INJECTION, POWDER, FOR SOLUTION INTRAMUSCULAR; INTRAVENOUS at 10:49

## 2019-06-17 RX ADMIN — HEPARIN SODIUM 5000 UNIT(S): 5000 INJECTION INTRAVENOUS; SUBCUTANEOUS at 05:07

## 2019-06-17 RX ADMIN — HEPARIN SODIUM 5000 UNIT(S): 5000 INJECTION INTRAVENOUS; SUBCUTANEOUS at 13:02

## 2019-06-17 RX ADMIN — SCOPALAMINE 1 PATCH: 1 PATCH, EXTENDED RELEASE TRANSDERMAL at 17:25

## 2019-06-17 RX ADMIN — MORPHINE SULFATE 2 MILLIGRAM(S): 50 CAPSULE, EXTENDED RELEASE ORAL at 14:42

## 2019-06-17 RX ADMIN — CHLORHEXIDINE GLUCONATE 1 APPLICATION(S): 213 SOLUTION TOPICAL at 05:06

## 2019-06-17 RX ADMIN — MORPHINE SULFATE 10 MILLIGRAM(S): 50 CAPSULE, EXTENDED RELEASE ORAL at 17:43

## 2019-06-17 RX ADMIN — MORPHINE SULFATE 10 MILLIGRAM(S): 50 CAPSULE, EXTENDED RELEASE ORAL at 21:20

## 2019-06-17 RX ADMIN — MORPHINE SULFATE 10 MILLIGRAM(S): 50 CAPSULE, EXTENDED RELEASE ORAL at 22:30

## 2019-06-17 RX ADMIN — MORPHINE SULFATE 2 MILLIGRAM(S): 50 CAPSULE, EXTENDED RELEASE ORAL at 02:47

## 2019-06-17 RX ADMIN — MORPHINE SULFATE 2 MILLIGRAM(S): 50 CAPSULE, EXTENDED RELEASE ORAL at 15:23

## 2019-06-17 NOTE — PROGRESS NOTE ADULT - ASSESSMENT
82 Female poor historian with pmhx of HTN, TIA, h/o AAA repair, polymyalgia rheumatica p/w rib pain. as per ED chart she presented to the hospital for right rib pain.  Family was notified possible rib fx  by PCP via X-ray and was told to got o ED for further w/u. As per son, pt had unknown injury 2-3 weeks ago. When EMS went to home she was hypotensive hypoxemic.     # Exudative Right pleural effusion with mediastinal lymphadenopathy and right hepatic lobe hypodensity probably malignancy  -  CT Chest No Cont (06.12.19 @ 22:35) multiple enlarged right paratracheal lymph nodes measuring up to 2.7 x 2.6 cm (series 7, image 111). Subcarinal lymph node measures 3.0 x 2.3 cm. Questionable 1.7 cm right hepatic lobe hypodensity. Age-indeterminate T12 compression deformity, new from 2017. Large right pleura effusion with compressive atelectasis.   - S/p thoracentesis  - c/w ceftriaxone  - Blood cultures- neg  - F/u cytology  - Family does not want any work up if its malignancy  - evaluated by pulm- started on Prednisone  - F/u xray chest  - maintain oxygen >92    #Metabolic encephalopathy- multifactorial    #UTI  - urine cultures- E.coli  - C/w ceftriaxone    #HAGMA, Lactic acidosis  -IV fluids with bicarb  - F/u PTH    #Acute kidney injury on CKD Stage 3  - IV fluids  - monitor BUN/creat  - hold losartan    #Hypercalcemia  - probably sec to malignancy  - Pamidronate 60 mg  IVx1    #Hyperkalemia- resolved    #H/o TIA  - c/w plavix    #H/o Hypertension- pt with low BP  - hold norvac, labetalol, losatrtan    #Rib fracture  - incentive spirometry  - pain meds    #Type 2 MI sec to hypotension, CKD    # DVT prophylaxis    #Poor Prognosis    #Code : DNR, DNI, no PEG tube/NGT, no dialysis.  #Discussed with patients daughter- family deciding on Comfort care, Hospice, Family meeting today  # Disposition: undecided .

## 2019-06-17 NOTE — PROGRESS NOTE ADULT - ASSESSMENT
Ms. Prado is a 82 female poor historian with pmhx of HTN, TIA, h/o AAA repair, polymyalgia rheumatica presenting with rib pain after fall 2-3 weeks ago.     # Exudative Right pleural effusion with mediastinal lymphadenopathy and right hepatic lobe hypodensity probably malignancy  - CT Chest No Cont: multiple enlarged right paratracheal lymph nodes measuring up to 2.7 x 2.6 cm. Subcarinal lymph node measures 3.0 x 2.3 cm. Questionable 1.7 cm right hepatic lobe hypodensity. Age-indeterminate T12 compression deformity, new from 2017. Large right pleura effusion with compressive atelectasis.   - S/p thoracentesis on  with exudative fluid, cytology pending  - Repeat CXR - right side effusion worsening.   - s/p family meeting with palliative-> decided to pursue  comfort care measures only.     #) UTI  - positive UA  - urine culture> [id number] GNR E coli- pan sensitive  - s/p rocephin  - DC antibiotics- Pt is comfort care, family doesnt want aggressive measures.     # DANIEL on top of CKD stage 3   -likely due to ischemic ATN (relative hypotension with baseline hypertension)  -Cr 1.3 10/2018 baseline  -slight improvement since admission  - Family refusing dialysis  - s/p  iv fluids     #)HAGMA, elevated lactate,   - might be due to DANIEL, or lactic acidosis  - repeat lactate is still elevated  - renal US (refused by family)  - 6/15: CO2: 22, A    #) Hyperkalemia, resolved  - Due to DANIEL  - S/p dextrose/insulin  - s/p rectal kayexalate  - monitor K    #) Hypercalcemia  - Likely associated with malignancy.  - On IV hydration,     #) Acute fracture of right 6th rib s/p fall  -pain control  -incentive spirometry    #)TIA  -will resume plavix     #) H/o Hypertension- pt with low BP  - hold norvac, labetalol, losatrtan    #)Diet: failed swallow evaluation. Will keep NPO. Family refusing NG feeding or PEG  or comfort feeds  #)DVT ppx: Hep SC  #)GI ppx: Not indicated  #)Dispo; From home as per patient  #)Code; DNR, DNI, no PEG tube/NGT, no dialysis. Ms. Prado is a 82 female poor historian with pmhx of HTN, TIA, h/o AAA repair, polymyalgia rheumatica presenting with rib pain after fall 2-3 weeks ago.     # Exudative Right pleural effusion with mediastinal lymphadenopathy and right hepatic lobe hypodensity probably malignancy  - CT Chest No Cont: multiple enlarged right paratracheal lymph nodes measuring up to 2.7 x 2.6 cm. Subcarinal lymph node measures 3.0 x 2.3 cm. Questionable 1.7 cm right hepatic lobe hypodensity. Age-indeterminate T12 compression deformity, new from 2017. Large right pleura effusion with compressive atelectasis.   - S/p thoracentesis on 6/13 with exudative fluid, cytology pending  - Repeat CXR - right side effusion worsening.   - s/p family meeting with palliative-> decided to pursue  comfort care measures only.     #) UTI  - positive UA  - urine culture> [id number] GNR E coli- pan sensitive  - s/p rocephin  - DC antibiotics- Pt is comfort care, family doesnt want aggressive measures.     # DANIEL on top of CKD stage 3   -likely due to ischemic ATN (relative hypotension with baseline hypertension)  -Cr 1.3 10/2018 baseline  -slight improvement since admission  - Family refusing dialysis  - s/p  iv fluids - DC iv fluids, pt is comfort care.     #)HAGMA, elevated lactate  - might be due to DANIEL, or lactic acidosis  - renal US (refused by family)    #) Hyperkalemia, resolved  - Due to DANIEL  - S/p dextrose/insulin  - s/p rectal kayexalate  - no more blood draws- pt is comfort care- no blood draws.     #) Hypercalcemia  - Likely associated with malignancy.    #) Acute fracture of right 6th rib s/p fall    #)TIA  -DC plavix- pt is comfort care.     #) H/o Hypertension- pt with low BP  - Vitals q shift    #)Diet: failed swallow evaluation. Will keep NPO. Family refusing NG feeding or PEG  or comfort feeds. Discussed with family- family doesn't want comfort feeds  #)Code; DNR, DNI, no PEG tube/NGT, no dialysis.  Pt is comfort measures only, no IV abx, no iv fluids, no blood draws, vitals q shift, no comfort feeds.

## 2019-06-17 NOTE — PROGRESS NOTE ADULT - ATTENDING COMMENTS
Pt seen, assessment and plan as above.  Pt in end stages of life, family aware. Plan for comfort measures only at this time.  Pt may  in hospital  Recs as above

## 2019-06-17 NOTE — PROGRESS NOTE ADULT - PROVIDER SPECIALTY LIST ADULT
Critical Care
Hospitalist
Internal Medicine
Nephrology
Pulmonology
Pulmonology
Palliative Care
Nephrology
Pulmonology

## 2019-06-17 NOTE — GOALS OF CARE CONVERSATION - PERSONAL ADVANCE DIRECTIVE - TREATMENT GUIDELINES
DNR Order/No artificial nutrition/No blood draws/IV fluid trial
Do not re-hospitalize/No IV fluids/DNR Order/Comfort measures only/No antibiotics/No blood draws/No artificial nutrition

## 2019-06-17 NOTE — PROGRESS NOTE ADULT - SUBJECTIVE AND OBJECTIVE BOX
OVERNIGHT EVENTS: events noted, sob , cough,     Vital Signs Last 24 Hrs  T(C): 36.1 (17 Jun 2019 00:02), Max: 36.5 (16 Jun 2019 07:43)  T(F): 96.9 (17 Jun 2019 00:02), Max: 97.7 (16 Jun 2019 07:43)  HR: 109 (17 Jun 2019 00:02) (101 - 109)  BP: 152/70 (17 Jun 2019 00:02) (152/70 - 189/76)  BP(mean): 101 (17 Jun 2019 00:02) (101 - 109)  RR: 18 (17 Jun 2019 00:02) (18 - 95)  SpO2: 86%    PHYSICAL EXAMINATION:    GENERAL: ill looking,    HEENT: Head is normocephalic and atraumatic. Extraocular muscles are intact. Mucous membranes are moist.    NECK: Supple.    LUNGS: b/l rhonchi/ wheezing    HEART: Regular rate and rhythm without murmur.    ABDOMEN: Soft, nontender, and nondistended.      EXTREMITIES: Without any cyanosis, clubbing, rash, lesions or edema.    NEUROLOGIC: Grossly intact.    SKIN: No ulceration or induration present.      LABS:                        11.0   9.47  )-----------( 176      ( 17 Jun 2019 05:55 )             35.8     06-16    141  |  105  |  74<HH>  ----------------------------<  64<L>  4.6   |  19  |  2.2<H>    Ca    11.2<H>      16 Jun 2019 05:13  Mg     1.8     06-16    TPro  4.6<L>  /  Alb  2.5<L>  /  TBili  0.5  /  DBili  x   /  AST  49<H>  /  ALT  28  /  AlkPhos  130<H>  06-16 06-16-19 @ 07:01  -  06-17-19 @ 07:00  --------------------------------------------------------  IN: 500 mL / OUT: 500 mL / NET: 0 mL        MICROBIOLOGY:  Culture Results:   No growth to date. (06-14 @ 06:08)  Culture Results:   No growth (06-13 @ 14:25)  Culture Results:   Testing in progress (06-13 @ 14:25)  Culture Results:   No growth to date. (06-13 @ 12:02)      MEDICATIONS  (STANDING):  cefTRIAXone   IVPB 1 Gram(s) IV Intermittent every 24 hours  chlorhexidine 4% Liquid 1 Application(s) Topical <User Schedule>  clopidogrel Tablet 75 milliGRAM(s) Oral daily  docusate sodium 100 milliGRAM(s) Oral two times a day  heparin  Injectable 5000 Unit(s) SubCutaneous every 8 hours  mirtazapine 7.5 milliGRAM(s) Oral at bedtime  senna 2 Tablet(s) Oral at bedtime  sodium chloride 0.9%. 1000 milliLiter(s) (50 mL/Hr) IV Continuous <Continuous>    MEDICATIONS  (PRN):  morphine  - Injectable 2 milliGRAM(s) IV Push every 4 hours PRN Severe Pain (7 - 10)      RADIOLOGY & ADDITIONAL STUDIES:

## 2019-06-17 NOTE — PROGRESS NOTE ADULT - ASSESSMENT
83yFemale being evaluated for goals of care and symptom management. Pt appears short of breath. Pt opens eyes but cannot verbalize anymore, too weak. On IVF/IV ABT. family a t bedside. Daughter Juan from [place] is here there is a current HCP document listing her as HCP. Meeting held with palliative NP  and SW and pt's daughters and grandson. They are aware of medical diagnosis of suspected metastatic lung ca to liver and bone. They reflected on how she has declined.     Discussed next steps of comfort measures. Both SW and NP explained and reassured family about pain management. And managing symptoms r/t dyspnea, Hospice consult was ordered and family is deciding whether to do hospice at home or SNF. They are aware pt could change and start actively dying in the hospital. They have been updating there other sibling. He lives in  and has been visiting as well. They are in agreement with stopping IVF all labs and tests.           Recommendations:  DNR/DNI/CMO  Roxanol 10mg Q 4 HRS RTC  Roxanol 10mg Q 1 HR PRN - dyspnea/pain  Ativan 0.5mg Q 4 IV HRS PRN for agitation  When D/C hospice will convert to liquid Ativan  Hospice consult placed last week on Friday  called to follow up and left message for hospice nurse 83yFemale being evaluated for goals of care and symptom management. Pt appears short of breath. Pt opens eyes but cannot verbalize anymore, too weak. On IVF/IV ABT. family a t bedside. Daughter Juan from [place] is here there is a current HCP document listing her as HCP. Meeting held with palliative NP  and SW and pt's daughters and grandson. They are aware of medical diagnosis of suspected metastatic lung ca to liver and bone. They reflected on how she has declined.     Discussed next steps of comfort measures. Both SW and NP explained and reassured family about pain management. And managing symptoms r/t dyspnea, Hospice consult was ordered and family is deciding whether to do hospice at home or SNF. They are aware pt could change and start actively dying in the hospital. They have been updating there other sibling. He lives in  and has been visiting as well. They are in agreement with stopping IVF all labs and tests.           Recommendations:  DNR/DNI/CMO  Roxanol 10mg Q 4 HRS RTC  Roxanol 10mg Q 1 HR PRN - dyspnea/pain  Ativan 0.5mg Q 4 IV HRS PRN for agitation  Scopolamine Patch Q 72 hrs   When D/C hospice will convert to liquid Ativan  Hospice consult placed last week on Friday  called to follow up and left message for hospice nurse 83yFemale being evaluated for goals of care and symptom management. Pt appears short of breath. Pt opens eyes but cannot verbalize anymore, too weak. On IVF/IV ABT. family a t bedside. Daughter Juan from [place] is here there is a current HCP document listing her as HCP. Meeting held with palliative NP  and SW and pt's daughters and grandson. They are aware of medical diagnosis of suspected metastatic lung ca to liver and bone. They reflected on how she has declined.     Discussed next steps of comfort measures. Both SW and NP explained and reassured family about pain management. And managing symptoms r/t dyspnea, Hospice consult was ordered and family is deciding whether to do hospice at home or SNF. They are aware pt could change and start actively dying in the hospital. They have been updating theeir other sibling. He lives in  and has been visiting as well. They are in agreement with stopping IVF all labs and tests.           Recommendations:  DNR/DNI/CMO  Roxanol 10mg Q 4 HRS RTC  Roxanol 10mg Q 1 HR PRN - dyspnea/pain  Ativan 0.5mg Q 4 IV HRS PRN for agitation  Scopolamine Patch Q 72 hrs   When/if  D/C hospice will convert to liquid Ativan  Hospice consult placed last week on Friday  called to follow up and left message for hospice nurse

## 2019-06-17 NOTE — PROGRESS NOTE ADULT - ASSESSMENT
DANIEL  -slowly improving  CKD 3  metabolic acidosis  hypercalcemia - likely due to malignancy  ARF  large right pleural effusion  UTI e.coli  likely metastatic lung cancer  AAA repair   PMR    plan:    recommend:  pamidronate 60mg ivpb x 1 over 4 hours - may help even in palliative fashion to improve mental status  change back to alkali ivf:  d51/2ns with nahco3 75meq/l @ 60 cc/hr  keep off bp meds including ARB  check PTH  no dialysis  cont abx (rocephin)  DNR / DNI  family meeting  likely hospice  palliative care f/u  prognosis poor

## 2019-06-17 NOTE — GOALS OF CARE CONVERSATION - PERSONAL ADVANCE DIRECTIVE - NS PRO AD PATIENT TYPE ON CHART
Medical Orders for Life-Sustaining Treatment (MOLST)
Health Care Proxy (HCP)/Do Not Resuscitate (DNR)/Medical Orders for Life-Sustaining Treatment (MOLST)

## 2019-06-17 NOTE — SWALLOW BEDSIDE ASSESSMENT ADULT - SLP PERTINENT HISTORY OF CURRENT PROBLEM
Pt admitted w/ R pleural effusion, poor po intake +mediastinal lymph nodes w/ possible liver mets, failure to thrive
Pt admitted w/ R pleural effusion, poor po intake +mediastinal lymph nodes w/ possible liver mets, failure to thrive

## 2019-06-17 NOTE — GOALS OF CARE CONVERSATION - PERSONAL ADVANCE DIRECTIVE - TREATMENT GUIDELINE COMMENT
DNR/DNI, no labs, vital Q shift, IV fluids, NPO, continue with IV Abx.   Plan is awaiting dudley's daughter on Monday from [facility].  Family wants to take patient home on hospice.
DNR/I; CMO

## 2019-06-17 NOTE — GOALS OF CARE CONVERSATION - PERSONAL ADVANCE DIRECTIVE - NS PRO AD PATIENT TYPE
Do Not Resuscitate (DNR)/Medical Orders for Life-Sustaining Treatment (MOLST)
Medical Orders for Life-Sustaining Treatment (MOLST)/Health Care Proxy (HCP)/Do Not Resuscitate (DNR)

## 2019-06-17 NOTE — PROGRESS NOTE ADULT - SUBJECTIVE AND OBJECTIVE BOX
NEPHROLOGY FOLLOW UP NOTE    pt seen and examined  still lethargic  renal function slowly improving  poor historian  bp's fair, no fever  poor po intake  family contemplating hospice          PAST MEDICAL & SURGICAL HISTORY:  Hip fracture  Smoker  Polymyalgia rheumatica  Abdominal aortic aneurysm (AAA) without rupture  Essential hypertension  Falls  TIA (transient ischemic attack)  H/O abdominal aortic aneurysm repair    Allergies:  Fosamax (Other (Moderate))  ibuprofen (Flushing)  Novacet (Rash)  penicillin (Rash)  Sulfac 10% (Rash)  Tylenol (Pruritus)    Home Medications Reviewed    SOCIAL HISTORY:  Denies ETOH,Smoking,   FAMILY HISTORY:  No pertinent family history in first degree relatives        REVIEW OF SYSTEMS:  poor historian    PHYSICAL EXAM:  ill appearing  lethargic  O2 via NC  dry mm  flat jvp  b/l rhonchi  montse  firm  + ugarte  no edema    Hospital Medications:   MEDICATIONS  (STANDING):  cefTRIAXone   IVPB 1 Gram(s) IV Intermittent every 24 hours  chlorhexidine 4% Liquid 1 Application(s) Topical <User Schedule>  clopidogrel Tablet 75 milliGRAM(s) Oral daily  docusate sodium 100 milliGRAM(s) Oral two times a day  heparin  Injectable 5000 Unit(s) SubCutaneous every 8 hours  mirtazapine 7.5 milliGRAM(s) Oral at bedtime  predniSONE   Tablet 40 milliGRAM(s) Oral daily  senna 2 Tablet(s) Oral at bedtime  sodium chloride 0.9%. 1000 milliLiter(s) (50 mL/Hr) IV Continuous <Continuous>        VITALS:  T(F): 97.3 (19 @ 07:52), Max: 97.3 (19 @ 07:52)  HR: 98 (19 @ 07:52)  BP: 114/56 (19 @ 07:52)  RR: 20 (19 @ 07:52)  SpO2: --  Wt(kg): --    06-15 @ 07:01  -   @ 07:00  --------------------------------------------------------  IN: 550 mL / OUT: 559 mL / NET: -9 mL     @ 07:01  -   @ 07:00  --------------------------------------------------------  IN: 500 mL / OUT: 500 mL / NET: 0 mL          LABS:      142  |  104  |  82<HH>  ----------------------------<  59<L>  4.8   |  14<L>  |  2.3<H>    Ca    12.1<H>      2019 05:55  Mg     2.0         TPro  4.6<L>  /  Alb  2.6<L>  /  TBili  0.6  /  DBili      /  AST  52<H>  /  ALT  28  /  AlkPhos  122<H>                            11.0   9.47  )-----------( 176      ( 2019 05:55 )             35.8       Urine Studies:  Urinalysis Basic - ( 2019 00:24 )    Color: Yellow / Appearance: Turbid / S.020 / pH:   Gluc:  / Ketone: Trace  / Bili: Small / Urobili: 0.2 mg/dL   Blood:  / Protein: Trace mg/dL / Nitrite: Negative   Leuk Esterase: Large / RBC: 3-5 /HPF / WBC >50 /HPF   Sq Epi:  / Non Sq Epi:  / Bacteria: Many /HPF          RADIOLOGY & ADDITIONAL STUDIES:

## 2019-06-17 NOTE — PROGRESS NOTE ADULT - SUBJECTIVE AND OBJECTIVE BOX
83yFemale with diagnosis: RIB FRACTURE;UTI (URINARY TRACT INFECTION)PLEURAL EFFUSION;COMPRESSION FRA      Patient seen,       PHYSICAL EXAM    T(C): , Max: 36.3 (07:52)  T(F): 97.3  HR: 98 (98 - 109)  BP: 114/56 (114/56 - 189/76)  RR: 20 (18 - 20)  SpO2: --              LABS:                          11.0   9.47  )-----------( 176      ( 17 Jun 2019 05:55 )             35.8                                                                                      06-17    142  |  104  |  82<HH>  ----------------------------<  59<L>  4.8   |  14<L>  |  2.3<H>    Ca    12.1<H>      17 Jun 2019 05:55  Mg     2.0     06-17    TPro  4.6<L>  /  Alb  2.6<L>  /  TBili  0.6  /  DBili  x   /  AST  52<H>  /  ALT  28  /  AlkPhos  122<H>  06-17                                                      MEDICATIONS  (STANDING):  chlorhexidine 4% Liquid 1 Application(s) Topical <User Schedule>  clopidogrel Tablet 75 milliGRAM(s) Oral daily  docusate sodium 100 milliGRAM(s) Oral two times a day  mirtazapine 7.5 milliGRAM(s) Oral at bedtime  senna 2 Tablet(s) Oral at bedtime    MEDICATIONS  (PRN):  morphine  - Injectable 2 milliGRAM(s) IV Push every 4 hours PRN Severe Pain (7 - 10) 83yFemale with diagnosis: RIB FRACTURE;UTI (URINARY TRACT INFECTION)PLEURAL EFFUSION;COMPRESSION FRA      Patient seen for follow up.       PHYSICAL EXAM  Pt answers to her name only  appears in mild respiratory distress  IVF infusing    T(C): , Max: 36.3 (07:52)  T(F): 97.3  HR: 98 (98 - 109)  BP: 114/56 (114/56 - 189/76)  RR: 20 (18 - 20)  SpO2: --              LABS:                          11.0   9.47  )-----------( 176      ( 17 Jun 2019 05:55 )             35.8                                                                                      06-17    142  |  104  |  82<HH>  ----------------------------<  59<L>  4.8   |  14<L>  |  2.3<H>    Ca    12.1<H>      17 Jun 2019 05:55  Mg     2.0     06-17    TPro  4.6<L>  /  Alb  2.6<L>  /  TBili  0.6  /  DBili  x   /  AST  52<H>  /  ALT  28  /  AlkPhos  122<H>  06-17                                                      MEDICATIONS  (STANDING):  chlorhexidine 4% Liquid 1 Application(s) Topical <User Schedule>  clopidogrel Tablet 75 milliGRAM(s) Oral daily  docusate sodium 100 milliGRAM(s) Oral two times a day  mirtazapine 7.5 milliGRAM(s) Oral at bedtime  senna 2 Tablet(s) Oral at bedtime    MEDICATIONS  (PRN):  morphine  - Injectable 2 milliGRAM(s) IV Push every 4 hours PRN Severe Pain (7 - 10)

## 2019-06-17 NOTE — GOALS OF CARE CONVERSATION - PERSONAL ADVANCE DIRECTIVE - CONVERSATION DETAILS
Palliative Team met with patients daughter.  Palliative introduced, chart reviewed and case reviewed with [facility] staff.  Pt is DNR/DNI, most likely will be comfort measures only when dtr Franki arrives from [facility].      Prior to admission pt was living at home alone.  PMHx of HTN, TIA h/o AAA, polymyalgia, rheumatica p/w rib pain (s/p rib fx), hip fx, probable cancer.     Pt is alert, failed speech and swallow, family not interested in peg tube.      Plan is for family meeting, awaiting daughter from [facility] on Monday 1:30 pm. to further discuss GOC.
Palliative Care NP and LMSW met with pt.'s two daughters and grandson to introduce Palliative Care, and to discuss pt.'s overall medical conditions and treatment options.  All questions answered in detail.  Family had to be frequently redirected during conversation, but understand the nature of pt.'s illness and have opted for comfort at this time.  Discussed in detail the meaning of CMO, and family understands and agrees to DNR/I, no IV fluids, no artificial nutrition and no further lab draws.  MOLST completed.  Hospice philosophy and services introduced to family; hospice to f/u with family.

## 2019-06-17 NOTE — SWALLOW BEDSIDE ASSESSMENT ADULT - PHARYNGEAL PHASE
minimal laryngeal movement upon palpation
Delayed pharyngeal swallow/minimal laryngeal movement upon palpation/Decreased laryngeal elevation

## 2019-06-17 NOTE — PROGRESS NOTE ADULT - SUBJECTIVE AND OBJECTIVE BOX
Patient is a 83y old  Female who presents with a chief complaint of weakness (17 Jun 2019 12:04)    Patient was seen and examined.  Pt lethargic   no new events    PAST MEDICAL & SURGICAL HISTORY:  Hip fracture  Smoker  Polymyalgia rheumatica  Abdominal aortic aneurysm (AAA) without rupture  Essential hypertension  Falls  TIA (transient ischemic attack)  H/O abdominal aortic aneurysm repair      Allergies  Fosamax (Other (Moderate))  ibuprofen (Flushing)  Novacet (Rash)  penicillin (Rash)  Sulfac 10% (Rash)  Tylenol (Pruritus)    MEDICATIONS  (STANDING):  cefTRIAXone   IVPB 1 Gram(s) IV Intermittent every 24 hours  chlorhexidine 4% Liquid 1 Application(s) Topical <User Schedule>  clopidogrel Tablet 75 milliGRAM(s) Oral daily  docusate sodium 100 milliGRAM(s) Oral two times a day  heparin  Injectable 5000 Unit(s) SubCutaneous every 8 hours  mirtazapine 7.5 milliGRAM(s) Oral at bedtime  predniSONE   Tablet 40 milliGRAM(s) Oral daily  senna 2 Tablet(s) Oral at bedtime  sodium chloride 0.9%. 1000 milliLiter(s) (50 mL/Hr) IV Continuous <Continuous>    MEDICATIONS  (PRN):  morphine  - Injectable 2 milliGRAM(s) IV Push every 4 hours PRN Severe Pain (7 - 10)    Vital Signs Last 24 Hrs  T(C): 36.3  T(F): 97.3  HR: 98  BP: 114/56  BP(mean): 78  RR: 20  SpO2: --    O/E:  lethargic  HEENT: atraumatic, EOMI.  Chest: decreased breath sounds B/l. R>l  CVS: SIS2 +, no murmur.  P/A: Soft, BS+  CNS: lethargic  Ext: no edema feet.  All systems reviewed positive findings as above.                            11.0<L>  9.47  )-----------( 176      ( 17 Jun 2019 05:55 )             35.8<L>                        10.8<L>  9.03  )-----------( 158      ( 16 Jun 2019 05:13 )             34.5<L>    06-17    142  |  104  |  82<HH>  ----------------------------<  59<L>  4.8   |  14<L>  |  2.3<H>  06-16    141  |  105  |  74<HH>  ----------------------------<  64<L>  4.6   |  19  |  2.2<H>    Ca    12.1<H>      17 Jun 2019 05:55  Ca    11.2<H>      16 Jun 2019 05:13  Mg     2.0     06-17    TPro  4.6<L>  /  Alb  2.6<L>  /  TBili  0.6  /  DBili  x   /  AST  52<H>  /  ALT  28  /  AlkPhos  122<H>  06-17  TPro  4.6<L>  /  Alb  2.5<L>  /  TBili  0.5  /  DBili  x   /  AST  49<H>  /  ALT  28  /  AlkPhos  130<H>  06-16

## 2019-06-17 NOTE — SWALLOW BEDSIDE ASSESSMENT ADULT - COMMENTS
As per DARRYL Patricia, pt is on palliative care, continue with current recommendations. SLP to follow up PRN
Severe oral dysphagia, no oral transport, bolus removed from oral cavity.

## 2019-06-17 NOTE — PROGRESS NOTE ADULT - ASSESSMENT
Impression:    Right pleural effusion: Mediastinal Lymph nodes with possible liver mets. Most likely malignant effusion. Heavy smoker +weight loss s.p thora cyto P  Acute renal failure   Failure to thrive.     PLAN:    - f/up cyto  - Neb Q 4 AS needed  - repeat CXR  - prednisone 40 q 24  - will follow  - poor prognosis

## 2019-06-17 NOTE — PROGRESS NOTE ADULT - REASON FOR ADMISSION
weakness

## 2019-06-18 LAB
CULTURE RESULTS: SIGNIFICANT CHANGE UP
SPECIMEN SOURCE: SIGNIFICANT CHANGE UP

## 2019-06-18 NOTE — DISCHARGE NOTE FOR THE EXPIRED PATIENT - HOSPITAL COURSE
Ms. Prado is a 82 female poor historian with pmhx of HTN, TIA, h/o AAA repair, polymyalgia rheumatica presenting with rib pain after fall 2-3 weeks ago.     # Exudative Right pleural effusion with mediastinal lymphadenopathy and right hepatic lobe hypodensity probably malignancy  - CT Chest No Cont: multiple enlarged right paratracheal lymph nodes measuring up to 2.7 x 2.6 cm. Subcarinal lymph node measures 3.0 x 2.3 cm. Questionable 1.7 cm right hepatic lobe hypodensity. Age-indeterminate T12 compression deformity, new from 2017. Large right pleura effusion with compressive atelectasis.   - S/p thoracentesis on 6/13 with exudative fluid, cytology pending  - Repeat CXR - right side effusion worsening.   - s/p family meeting with palliative-> decided to pursue  comfort care measures only.     #) UTI  - positive UA  - urine culture> [id number] E coli- pan sensitive  - s/p rocephin  - DC antibiotics- Pt is comfort care, family doesnt want aggressive measures.     # DANIEL on top of CKD stage 3   -likely due to ischemic ATN (relative hypotension with baseline hypertension)  -Cr 1.3 10/2018 baseline  -slight improvement since admission  - Family refusing dialysis  - s/p  iv fluids - DC iv fluids, pt is comfort care.     #)HAGMA, elevated lactate  - might be due to DANIEL, or lactic acidosis  - renal US (refused by family)    #) Hyperkalemia, resolved  - Due to DANIEL  - S/p dextrose/insulin  - s/p rectal kayexalate  - no more blood draws- pt is comfort care- no blood draws.     #) Hypercalcemia  - Likely associated with malignancy.    #) Acute fracture of right 6th rib s/p fall    #)TIA  -DC plavix- pt is comfort care.     #) H/o Hypertension- pt with low BP  - Vitals q shift    #)Diet: failed swallow evaluation. Will keep NPO. Family refusing NG feeding or PEG  or comfort feeds. Discussed with family- family doesn't want comfort feeds  #)Code; DNR, DNI, no PEG tube/NGT, no dialysis.  Pt is comfort measures only, no IV abx, no iv fluids, no blood draws, vitals q shift, no comfort feeds.     Patient was in bed, not breathing, no pulse, pupils dilated bilaterally, non reactive. No reaction to pain. No heart or breath sounds heard on auscultation. TOD was called at 0540. Geoffrey Eden was at bedside. Explained procedure going forward. Offered autopsy and he opted no to. Ms. Prado is a 82 female poor historian with pmhx of HTN, TIA, h/o AAA repair, polymyalgia rheumatica presenting with rib pain after fall 2-3 weeks ago.     # Exudative Right pleural effusion with mediastinal lymphadenopathy and right hepatic lobe hypodensity probably malignancy  - CT Chest No Cont: multiple enlarged right paratracheal lymph nodes measuring up to 2.7 x 2.6 cm. Subcarinal lymph node measures 3.0 x 2.3 cm. Questionable 1.7 cm right hepatic lobe hypodensity. Age-indeterminate T12 compression deformity, new from 2017. Large right pleura effusion with compressive atelectasis.   - S/p thoracentesis on 6/13 with exudative fluid, cytology pending  - Repeat CXR - right side effusion worsening.   - s/p family meeting with palliative-> decided to pursue  comfort care measures only.     #) UTI  - positive UA  - urine culture> [id number] GNR E coli- pan sensitive  - s/p rocephin  - DC antibiotics- Pt is comfort care, family doesnt want aggressive measures.     # DANIEL on top of CKD stage 3   -likely due to ischemic ATN (relative hypotension with baseline hypertension)  -Cr 1.3 10/2018 baseline  -slight improvement since admission  - Family refusing dialysis  - s/p  iv fluids - DC iv fluids, pt is comfort care.     #)HAGMA, elevated lactate  - might be due to DANIEL, or lactic acidosis  - renal US (refused by family)    #) Hyperkalemia, resolved  - Due to DANIEL  - S/p dextrose/insulin  - s/p rectal kayexalate  - no more blood draws- pt is comfort care- no blood draws.     #) Hypercalcemia  - Likely associated with malignancy.    #) Acute fracture of right 6th rib s/p fall    #)TIA  -DC plavix- pt is comfort care.     #) H/o Hypertension- pt with low BP  - Vitals q shift    #)Diet: failed swallow evaluation. Will keep NPO. Family refusing NG feeding or PEG  or comfort feeds. Discussed with family- family doesn't want comfort feeds  #)Code; DNR, DNI, no PEG tube/NGT, no dialysis.  Pt is comfort measures only, no IV abx, no iv fluids, no blood draws, vitals q shift, no comfort feeds.     Patient was in bed, not breathing, no pulse, pupils dilated bilaterally, non reactive. No reaction to pain. No heart or breath sounds heard on auscultation. TOD was called at 0540. Grandmichelle Eden was at bedside. Explained procedure going forward. Offered autopsy and he opted no to.   Due to patient possibly presenting after trauma case had to be sent to ME's office. Discussed case with OSMANY Gómez, cause of death is related to metastatic disease and fracture was known prior to presentation. Death is non reportable. Case # is [id number]

## 2019-06-19 LAB
CULTURE RESULTS: SIGNIFICANT CHANGE UP
CULTURE RESULTS: SIGNIFICANT CHANGE UP
SPECIMEN SOURCE: SIGNIFICANT CHANGE UP
SPECIMEN SOURCE: SIGNIFICANT CHANGE UP

## 2019-06-20 LAB — TM INTERPRETATION: SIGNIFICANT CHANGE UP

## 2019-06-21 LAB — PTH RELATED PROT SERPL-MCNC: <2 PMOL/L — SIGNIFICANT CHANGE UP

## 2019-06-26 LAB — CHROM ANALY OVERALL INTERP SPEC-IMP: SIGNIFICANT CHANGE UP

## 2019-07-13 LAB
CULTURE RESULTS: SIGNIFICANT CHANGE UP
SPECIMEN SOURCE: SIGNIFICANT CHANGE UP

## 2020-06-28 NOTE — PATIENT PROFILE ADULT - HARM RISK FACTORS
You were seen in the Emergency Department following fall off a shed.    Raul showed evidence of left-sided rib fractures in addition to a 20% compression fracture of your L2 vertebrae.    Please use 1,000mg of tylenol or 600mg of ibuprofen every 6 hours as needed for pain.  Take stronger pain medication as needed for severe pain.  Wear brace at all times.    Please call on Monday for immediate follow-up with Dr. Mathew.  Make sure to let them know that you were seen in the ER and told to follow-up.    Return to the Emergency Department with uncontrolled pain, trouble breathing, numbness or weakness in extremities, or other concerns.    
yes

## 2020-10-15 NOTE — PRE-ANESTHESIA EVALUATION ADULT - NSANTHLABRESULTSFT_GEN_ALL_CORE
Physical Exam  TM Distance: >3 FB      Anesthesia Plan  ASA Status: 2  Anesthesia Type: General  Plan Comments: LOWER EXTREMITY PERIPHERAL NERVE BLOCK INFORMED CONSENT    Informed consent was obtained from the patient for a FEMORAL NERVE  block.     Benefits described include better postoperative analgesia, decreased need for IV or oral narcotics, and fewer narcotic related side effects such as sedation, nausea, constipation, and pruritis.  Normally, the benefits outweigh the risks.    Risks explained were block failure, hematoma, infection, falling and post-operative neurologic symptoms .  All blocks could cause a very rare but serious or even fatal reaction to the local anesthetic like seizure, arrhythmia, cardiac arrest or even death.    Alternatives to the nerve block were explained and offered, including no nerve block, local anesthetic infiltration by the surgeon, and increased IV and oral analgesics.    For the duration of the nerve block the patient has been instructed to protect the affected lower extremity from injury, excessive heat and cold.  The patient verbalized understanding of these instructions.    The Patient  has agreed to this procedure and understands the risks, benefits, alternatives, and expected result of the nerve block.  They had the opportunity to ask questions.    Comments: Possible risks include failed or partial block, inadvertent intravascular injection, nerve injury, and infection.      ROS/Med Hx  
reviewed
stated

## 2021-02-17 NOTE — DISCHARGE NOTE ADULT - BECAUSE OF A PHYSICAL, MENTAL OR EMOTIONAL CONDITION, DO YOU HAVE DIFFICULTY DOING  ERRANDS ALONE LIKE VISITING A DOCTOR'S OFFICE OR SHOPPING (15 YEARS AND OLDER)
Detail Level: Detailed Medical Necessity Clause: This procedure was medically necessary because the lesions that were treated were: Include Z78.9 (Other Specified Conditions Influencing Health Status) As An Associated Diagnosis?: No X Size Of Lesion In Cm (Optional): 0 Kenalog Preparation: Kenalog Total Volume Injected (Ccs- Only Use Numbers And Decimals): 5.0 cc Consent: The risks of atrophy were reviewed with the patient. Concentration Of Solution Injected (Mg/Ml): 5.0 No Yes

## 2021-03-01 NOTE — ED ADULT TRIAGE NOTE - AS TEMP SITE
----- Message from Henok Barakat sent at 2/26/2021  4:53 PM EST -----  Subject: Message to Provider    QUESTIONS  Information for Provider? Patient has phoned in to give you the [place] [phone] medical services. Thank you   ---------------------------------------------------------------------------  --------------  CALL BACK INFO  What is the best way for the office to contact you? OK to leave message on   voicemail  Preferred Call Back Phone Number? [phone]  ---------------------------------------------------------------------------  --------------  SCRIPT ANSWERS  Relationship to Patient?  Self axillary

## 2021-11-27 NOTE — PRE-ANESTHESIA EVALUATION ADULT - BMI (KG/M2)
This patient has been assessed with a concern for Malnutrition and has been determined to have a diagnosis/diagnoses of Morbid obesity (BMI > 40).    This patient is being managed with:   Diet Consistent Carbohydrate/No Snacks-  Minced and Moist (MINCEDMOIST)  Low Sodium  Halal  No Pork  Supplement Feeding Modality:  Oral  Glucerna Shake Cans or Servings Per Day:  1       Frequency:  Two Times a day  Entered: Nov 22 2021  3:45PM     This patient has been assessed with a concern for Malnutrition and has been determined to have a diagnosis/diagnoses of Morbid obesity (BMI > 40).    This patient is being managed with:   Diet Consistent Carbohydrate/No Snacks-  Minced and Moist (MINCEDMOIST)  Low Sodium  Halal  No Pork  Supplement Feeding Modality:  Oral  Glucerna Shake Cans or Servings Per Day:  1       Frequency:  Two Times a day  Entered: Nov 22 2021  3:45PM     This patient has been assessed with a concern for Malnutrition and has been determined to have a diagnosis/diagnoses of Morbid obesity (BMI > 40).    This patient is being managed with:   Diet Consistent Carbohydrate/No Snacks-  Minced and Moist (MINCEDMOIST)  Low Sodium  Halal  No Pork  Supplement Feeding Modality:  Oral  Glucerna Shake Cans or Servings Per Day:  1       Frequency:  Two Times a day  Entered: Nov 22 2021  3:45PM     This patient has been assessed with a concern for Malnutrition and has been determined to have a diagnosis/diagnoses of Morbid obesity (BMI > 40).    This patient is being managed with:   Diet Consistent Carbohydrate/No Snacks-  Minced and Moist (MINCEDMOIST)  Low Sodium  Halal  No Pork  Supplement Feeding Modality:  Oral  Glucerna Shake Cans or Servings Per Day:  1       Frequency:  Two Times a day  Entered: Nov 22 2021  3:45PM     This patient has been assessed with a concern for Malnutrition and has been determined to have a diagnosis/diagnoses of Morbid obesity (BMI > 40).    This patient is being managed with:   Diet Consistent Carbohydrate/No Snacks-  Minced and Moist (MINCEDMOIST)  Low Sodium  Halal  No Pork  Supplement Feeding Modality:  Oral  Glucerna Shake Cans or Servings Per Day:  1       Frequency:  Two Times a day  Entered: Nov 22 2021  3:45PM     This patient has been assessed with a concern for Malnutrition and has been determined to have a diagnosis/diagnoses of Morbid obesity (BMI > 40).    This patient is being managed with:   Diet Consistent Carbohydrate/No Snacks-  Minced and Moist (MINCEDMOIST)  Low Sodium  Halal  No Pork  Supplement Feeding Modality:  Oral  Glucerna Shake Cans or Servings Per Day:  1       Frequency:  Two Times a day  Entered: Nov 22 2021  3:45PM      This patient has been assessed with a concern for Malnutrition and has been determined to have a diagnosis/diagnoses of Morbid obesity (BMI > 40).    This patient is being managed with:   Diet Consistent Carbohydrate/No Snacks-  Minced and Moist (MINCEDMOIST)  Low Sodium  Halal  No Pork  Supplement Feeding Modality:  Oral  Glucerna Shake Cans or Servings Per Day:  1       Frequency:  Two Times a day  Entered: Nov 22 2021  3:45PM     23.2

## 2021-12-25 NOTE — SWALLOW BEDSIDE ASSESSMENT ADULT - SWALLOW EVAL: DIAGNOSIS
+swallow is not functional for po intake, aspiration risk is high
+swallow is not functional for po intake, aspiration risk is high
eliquis  protonix
pmh of hld  f/u lipid profile  conitnue home meds  dash diet

## 2023-01-27 NOTE — CHART NOTE - NSCHARTNOTEFT_GEN_A_CORE
Per 6/14 palliative note,   no further escalating of care, no Tube feed.  Likely comfort measure only.  RD to sign off at this time.   Consult RD as needed.  Thanks
Tertiary Survey    Patient seen and examined. No complaints at this time.     PHYSICAL EXAM:  Craniofacial: Atraumatic, No deformity  Eyes: Pupil Size equal B/L and RTL. EOMI  Oropharynx: Atraumatic  Neck: Non-tender, No deformity, Trachea midline.  Chest: Equal breath sounds, non-tender, No deformity or crepitus  Heart: RSR  Abdomen: Atraumatic, Non-tender, non-distended. No hepatosplenomegaly  Pelvis: Stable, non tender, no deformity  Back: Spine nontender. atraumatic  Extremities: Atraumatic, no deformities, normal pulses  Neurologic: CN intact, Motor intact throughout. Sensation intact/normal throughout.    All images/reports reviewed. No further traumatic work-up warranted.  Tertiary survey completed  No pain on questioning, moving extremities  No further trauma workup or management warranted.  Continue medical management
Upon Nutritional Assessment by the Registered Dietitian your patient was determined to meet criteria / has evidence of the following diagnosis/diagnoses:          [ ]  Mild Protein Calorie Malnutrition        [ ]  Moderate Protein Calorie Malnutrition        [ ] Severe Protein Calorie Malnutrition        [ ] Unspecified Protein Calorie Malnutrition        [ x] Underweight / BMI <19        [ ] Morbid Obesity / BMI > 40      Findings as based on:  •  Comprehensive nutrition assessment and consultation  - data obtained from previous admission vs this admission    Height: 154.9cm  Weight: 43.6kg (per 6/13/2019)  Weight: 57kg (10/2018)  Current BMI: 18.2  Weight loss: 23.5% in the past 8 months      Renal LIP met with family around 10:30am today and family wants Palliative consult. ?Hospice.   RD to monitor PO status for now.
Consultant

## 2023-03-29 NOTE — DISCHARGE NOTE ADULT - IF YOU ARE A SMOKER, IT IS IMPORTANT FOR YOUR HEALTH TO STOP SMOKING. PLEASE BE AWARE THAT SECOND HAND SMOKE IS ALSO HARMFUL.
Statement Selected PAST MEDICAL HISTORY:  Alcohol withdrawal seizure     Capsulitis, adhesive shoulder     Cocaine abuse     H/O ETOH abuse     Liver laceration

## 2023-05-25 NOTE — OCCUPATIONAL THERAPY INITIAL EVALUATION ADULT - IMPAIRED TRANSFERS: SIT/STAND, REHAB EVAL
impaired balance/decreased strength Mallampati - 3  Partial lower dentures, has full upper dentures.   Denies loose teeth. Mallampati - 3  Has partial lower dentures, has full upper dentures.   Denies loose teeth.

## 2024-02-08 NOTE — PRE-OP CHECKLIST - NEEDLE GAUGE
[facility]      Patient:  Alexsandra Emerson 70 y.o. female     Date of Service: 2/8/24      Chief Complaint:   Chief Complaint   Patient presents with    Otalgia                History of Present Illness     Concern of cough, congestion, left ear pain started about 5 to 6 days ago.  Does not recall any ill or sick contacts in close proximity.  No known exposure to COVID-19 or influenza.  No current change in taste, smell.  Does report higher fevers.  Decreased intake of p.o. food and fluid.    Also has a chronic history of glenohumeral joint arthritis in the bilateral shoulders.  Has previously been evaluated however has not previously followed up with orthopedic surgery.  Notes that she was scheduled however was unable to follow-up.  Notes significant pain with range of motion and inability to conduct daily tasks due to pain.  No recent trauma, injuries  Allergies:    Patient has no known allergies.    Medication List:    Current Outpatient Medications   Medication Sig Dispense Refill    metFORMIN (GLUCOPHAGE-XR) 500 MG extended release tablet TAKE ONE TABLET BY MOUTH DAILY WITH BREAKFAST. 90 tablet 3    ALPRAZolam (XANAX) 0.25 MG tablet Take 1 tablet by mouth daily as needed for Anxiety for up to 90 days. Max Daily Amount: 0.25 mg 90 tablet 0    fludrocortisone (FLORINEF) 0.1 MG tablet Take 1 tablet by mouth daily 90 tablet 3    ferrous sulfate (IRON 325) 325 (65 Fe) MG tablet Take 1 tablet by mouth Every Monday, Wednesday, and Friday 30 tablet 5    DULoxetine (CYMBALTA) 20 MG extended release capsule Take 2 capsules by mouth daily 180 capsule 3    Cholecalciferol (VITAMIN D) 50 MCG (2000 UT) CAPS capsule Take by mouth      atorvastatin (LIPITOR) 40 MG tablet Take 1 tablet by mouth nightly 90 tablet 3    clopidogrel (PLAVIX) 75 MG tablet Take 1 tablet by mouth once daily 90 tablet 3    timolol (TIMOPTIC) 0.5 % ophthalmic solution Place 1 drop into both eyes 2 times daily      Multiple 
22

## 2024-03-08 NOTE — CONSULT NOTE ADULT - ASSESSMENT
Notified patient that labs cancelled and need to be drawn at [facility] lab M-W before 11am. Patient verbalized understanding and plans on going in two weeks.   
IMPRESSION: Rehab of right hip it fx, s/p mely / orif     PRECAUTIONS: [  ] Cardiac  [  ] Respiratory  [  ] Seizures [  ] Contact Isolation  [  ] Droplet Isolation  [  ] Other    Weight Bearing Status: wbat    RECOMMENDATION:    Out of Bed to Chair     DVT/Decubiti Prophylaxis    REHAB PLAN:     [ x  ] Bedside P/T 3-5 times a week   [x   ]   Bedside O/T  2-3 times a week             [   ] No Rehab Therapy Indicated                   [   ]  Speech Therapy   Conditioning/ROM                                    ADL  Bed Mobility                                               Conditioning/ROM  Transfers                                                     Bed Mobility  Sitting /Standing Balance                         Transfers                                        Gait Training                                               Sitting/Standing Balance  Stair Training [   ]Applicable                    Home equipment Eval                                                                        Splinting  [   ] Only      GOALS:   ADL   [ xx  ]   Independent                    Transfers  [x   ] Independent                          Ambulation  [ x  ] Independent     [  x  ] With device                            [   ]  CG                                                         [   ]  CG                                                                  [   ] CG                            [    ] Min A                                                   [   ] Min A                                                              [   ] Min  A          DISCHARGE PLAN:   [  x ]  Good candidate for Intensive Rehabilitation/Hospital based                                             Will tolerate 3hrs Intensive Rehab Daily                                       [    ]  Short Term Rehab in Skilled Nursing Facility                                       [    ]  Home with Outpatient or VN services                                         [    ]  Possible Candidate for Intensive Hospital based Rehab

## 2024-04-23 NOTE — DISCHARGE NOTE ADULT - MEDICATION SUMMARY - MEDICATIONS TO TAKE
Physical Therapy    Visit Type: initial evaluation  SUBJECTIVE  Patient agreed to participate in therapy this date.  RN cleared patient for physical therapy.  Patient agreeable to participate with Physical Therapy initial evaluation.  Patient admitted for \"Secondary hypertension\".  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  Patient reports that she has numbness for her right hand.      Patient / Family Goal: return home    Pain   Patient denies pain.     OBJECTIVE          Bed Mobility  - Supine to sit: independent  - Sit to supine: independent  Patient denies feeling lightheaded or dizzy.    Transfers  Assistive devices: gait belt  - Sit to stand: independent  - Stand to sit: independent  - Stand pivot: independent  Patient denies feeling lightheaded or dizzy.      Ambulation / Gait  - Assistive device: gait belt  - Distance (feet unless otherwise indicated): 100  - Assist Level: independent  Patient denies feeling lightheaded or dizzy.  Loss of balance or path deviation not observed during task performance.       Interventions     Training provided: bed mobility training, transfer training, gait training and safety training    Skilled input: Verbal instruction/cues  Verbal Consent: Writer verbally educated and received verbal consent for hand placement, positioning of patient, and techniques to be performed today from patient for clothing adjustments for techniques as described above and how they are pertinent to the patient's plan of care.         Education:   - Present and ready to learn: patient  Education provided during session:  - Results of above outlined education: Verbalizes understanding    ASSESSMENT   Interferring components: medical status limitations    Discharge needs based on today's  assessment:   - Current level of function: at baseline level of function   - Therapy needs at discharge: does not require ongoing therapy    AM-PAC  - Generalized Prior Level of Function: IND/MOD I ([facility] 22-24)       Key: MOD A=moderate assistance, IND/MOD I=independent/modified independent  - Generalized Current Level of Function     - Current Mobility Score: 24       AM-PAC Scoring Key= >21 Modified Independent; 20-21 Supervision; 18-19 Minimal assist; 13-17 Moderate assist; 9-12 Max assist; <9 Total assist        Predicted patient presentation: Low (stable) - Patient comorbidities and complexities, as defined above, will have little effect on progress for prescribed plan of care.    PLAN (while hospitalized)  Suggestions for next session as indicated:   PT Frequency: DC PT         Agreement to plan and goals: patient agrees with goals and treatment plan      Documented in the chart in the following areas: Prior Level of Function. Assessment/Plan.      Patient at End of Session:   Location: in chair  Safety measures: alarm system in place/re-engaged, call light within reach and equipment intact  Handoff to: nurse      Therapy procedure time and total treatment time can be found documented on the Time Entry flowsheet   I will START or STAY ON the medications listed below when I get home from the hospital:    predniSONE 10 mg oral tablet  -- 1 tab(s) by mouth once a day  -- Indication: For polymialgia rheumatica    LOSARTAN POT TAB 50MG  -- Indication: For cad    GABAPENTIN   /5ML  -- Indication: For pain    MIRTAZAPINE  TAB 7.5MG  -- Indication: For depression    CLOPIDOGREL  TAB 75MG  -- Indication: For Anticoagulation    amLODIPine 5 mg oral tablet  -- 1 tab(s) by mouth once a day  -- Indication: For Htn

## 2024-05-21 NOTE — PATIENT PROFILE ADULT - NSASFALLATTEMPTOOB_GEN_A_NUR
Inpatient Rehabilitation - Physical Therapy Treatment Note        Mark     Patient Name: Krystina Crowder  : 1955  MRN: [id number]    10/10  -LL 10/10  -AG    Posttreatment Pain Rating 10/10  -LL 10/10  -manual assistance from therapist  -      Tamica Hargrove, PT Physical Therapist    LL Yohana Archibald PTA Physical Therapist Assistant                  Tamica Hargrove, PT Physical Therapist    LL Yohana Archibald, SANIYA Physical Therapist Assistant                    Therapy Charges for Today       Code Description Service Date Service Provider Modifiers Qty    70184600947 HC GAIT TRAINING EA 15 MIN 5/21/2024 Yohana Archibald, SANIYA GP, CQ 1    07733886101 HC PT THERAPEUTIC ACT EA 15 MIN 5/21/2024 Yohana Archibald PTA GP, CQ 1    32246423158 HC PT THER PROC EA 15 MIN 5/21/2024 Yohana Archibald PTA GP, CQ 1              PT G-Codes  AM-PAC 6 Clicks Score (PT): 13      Yohana. SANIYA Archibald  5/21/2024     no

## 2024-08-23 NOTE — DISCHARGE NOTE ADULT - NSTOBACCOWEBSITE_GEN_A_NCS
For prevention, start propranolol 20 mg daily for 3-5 days. If tolerated, increase to 40 mg daily.   For rescue, increase sumatriptan to 100 mg. If this helps, great let me know and I will send new 100 mg rx. If it does not help, we can switch to rizatriptan.   For nausea, zofran refilled  Will try medrol dose pack to break status migraine.   We may need to consider CGRP in the future for prevention and rescue.     Suggestions for preventing or controlling migraines:  - Riboflavin (vitamin B2) 200 mg twice a day and magnesium glycinate 400-600 mg daily for prevention.   - CoQ10 100 mg daily increasing to 400 mg daily may also be helpful  - avoid triggers that can cause or worsen migraines (food, lack of sleep, stress, etc.)  - keep a diary of your headaches to note how often you get them, how long they last, and any other helpful information   - avoid taking abortive medication (NOT preventative medication) more than 3 days a week (includes both prescription and over the counter meds)  - take your preventative medication as directed. Let me know if you have side effects or problems with the medication. Do not suddenly stop the medication.  - melatonin 3 mg at night can help with sleep and headaches.    [place] Website --- www.quitnet.[email or web address]/[place] website --- www.smokefree.com

## 2024-12-19 NOTE — PATIENT PROFILE ADULT - HOW PATIENT ADDRESSED, PROFILE
Problem: Adult Inpatient Plan of Care  Goal: Plan of Care Review  12/19/2024 1220 by María Elena Mason RN  Outcome: Progressing  12/19/2024 0751 by María Elena Mason RN  Outcome: Progressing  Goal: Patient-Specific Goal (Individualized)  12/19/2024 1220 by María Elena Mason RN  Outcome: Progressing  12/19/2024 0751 by María Elena Mason RN  Outcome: Progressing  Goal: Absence of Hospital-Acquired Illness or Injury  12/19/2024 1220 by María Elena Mason RN  Outcome: Progressing  12/19/2024 0751 by María Elena Mason RN  Outcome: Progressing  Goal: Optimal Comfort and Wellbeing  12/19/2024 1220 by María Elena Mason RN  Outcome: Progressing  12/19/2024 0751 by Marí aElena Mason RN  Outcome: Progressing  Goal: Readiness for Transition of Care  12/19/2024 1220 by María Elena Mason RN  Outcome: Progressing  12/19/2024 0751 by María Elena Mason RN  Outcome: Progressing     Problem: Fall Injury Risk  Goal: Absence of Fall and Fall-Related Injury  12/19/2024 1220 by María Elena Mason RN  Outcome: Progressing  12/19/2024 0751 by María Elena Mason RN  Outcome: Progressing     Problem: Chemotherapy Effects  Goal: Nausea and Vomiting Relief  12/19/2024 1220 by María Elena Mason RN  Outcome: Progressing  12/19/2024 0751 by María Elena Mason RN  Outcome: Progressing     
  Problem: Adult Inpatient Plan of Care  Goal: Plan of Care Review  Outcome: Progressing  Goal: Patient-Specific Goal (Individualized)  Outcome: Progressing  Goal: Absence of Hospital-Acquired Illness or Injury  Outcome: Progressing  Goal: Optimal Comfort and Wellbeing  Outcome: Progressing  Goal: Readiness for Transition of Care  Outcome: Progressing     Problem: Fall Injury Risk  Goal: Absence of Fall and Fall-Related Injury  Outcome: Progressing     Problem: Chemotherapy Effects  Goal: Nausea and Vomiting Relief  Outcome: Progressing     
Ruby